# Patient Record
Sex: FEMALE | Race: OTHER | HISPANIC OR LATINO | URBAN - METROPOLITAN AREA
[De-identification: names, ages, dates, MRNs, and addresses within clinical notes are randomized per-mention and may not be internally consistent; named-entity substitution may affect disease eponyms.]

---

## 2018-04-12 ENCOUNTER — EMERGENCY (EMERGENCY)
Facility: HOSPITAL | Age: 28
LOS: 1 days | Discharge: HOME | End: 2018-04-12
Attending: EMERGENCY MEDICINE
Payer: SELF-PAY

## 2018-04-12 VITALS
DIASTOLIC BLOOD PRESSURE: 87 MMHG | SYSTOLIC BLOOD PRESSURE: 128 MMHG | HEART RATE: 90 BPM | TEMPERATURE: 99 F | OXYGEN SATURATION: 98 % | RESPIRATION RATE: 20 BRPM

## 2018-04-12 VITALS — HEIGHT: 55 IN | WEIGHT: 192.9 LBS

## 2018-04-12 LAB
ALBUMIN SERPL ELPH-MCNC: 4.1 G/DL — SIGNIFICANT CHANGE UP (ref 3.3–5.2)
ALP SERPL-CCNC: 60 U/L — SIGNIFICANT CHANGE UP (ref 40–120)
ALT FLD-CCNC: 25 U/L — SIGNIFICANT CHANGE UP
ANION GAP SERPL CALC-SCNC: 11 MMOL/L — SIGNIFICANT CHANGE UP (ref 5–17)
AST SERPL-CCNC: 19 U/L — SIGNIFICANT CHANGE UP
BILIRUB SERPL-MCNC: <0.2 MG/DL — LOW (ref 0.4–2)
BUN SERPL-MCNC: 9 MG/DL — SIGNIFICANT CHANGE UP (ref 8–20)
CALCIUM SERPL-MCNC: 9.1 MG/DL — SIGNIFICANT CHANGE UP (ref 8.6–10.2)
CHLORIDE SERPL-SCNC: 104 MMOL/L — SIGNIFICANT CHANGE UP (ref 98–107)
CO2 SERPL-SCNC: 26 MMOL/L — SIGNIFICANT CHANGE UP (ref 22–29)
CREAT SERPL-MCNC: 0.68 MG/DL — SIGNIFICANT CHANGE UP (ref 0.5–1.3)
D DIMER BLD IA.RAPID-MCNC: <150 NG/ML DDU — SIGNIFICANT CHANGE UP
GLUCOSE SERPL-MCNC: 96 MG/DL — SIGNIFICANT CHANGE UP (ref 70–115)
HCG SERPL-ACNC: <5 MIU/ML — SIGNIFICANT CHANGE UP
HCT VFR BLD CALC: 40.7 % — SIGNIFICANT CHANGE UP (ref 37–47)
HGB BLD-MCNC: 13.8 G/DL — SIGNIFICANT CHANGE UP (ref 12–16)
MCHC RBC-ENTMCNC: 29.4 PG — SIGNIFICANT CHANGE UP (ref 27–31)
MCHC RBC-ENTMCNC: 33.9 G/DL — SIGNIFICANT CHANGE UP (ref 32–36)
MCV RBC AUTO: 86.8 FL — SIGNIFICANT CHANGE UP (ref 81–99)
PLATELET # BLD AUTO: 346 K/UL — SIGNIFICANT CHANGE UP (ref 150–400)
POTASSIUM SERPL-MCNC: 4.1 MMOL/L — SIGNIFICANT CHANGE UP (ref 3.5–5.3)
POTASSIUM SERPL-SCNC: 4.1 MMOL/L — SIGNIFICANT CHANGE UP (ref 3.5–5.3)
PROT SERPL-MCNC: 7 G/DL — SIGNIFICANT CHANGE UP (ref 6.6–8.7)
RBC # BLD: 4.69 M/UL — SIGNIFICANT CHANGE UP (ref 4.4–5.2)
RBC # FLD: 12.8 % — SIGNIFICANT CHANGE UP (ref 11–15.6)
SODIUM SERPL-SCNC: 141 MMOL/L — SIGNIFICANT CHANGE UP (ref 135–145)
TROPONIN T SERPL-MCNC: <0.01 NG/ML — SIGNIFICANT CHANGE UP (ref 0–0.06)
WBC # BLD: 9 K/UL — SIGNIFICANT CHANGE UP (ref 4.8–10.8)
WBC # FLD AUTO: 9 K/UL — SIGNIFICANT CHANGE UP (ref 4.8–10.8)

## 2018-04-12 PROCEDURE — 84484 ASSAY OF TROPONIN QUANT: CPT

## 2018-04-12 PROCEDURE — T1013: CPT

## 2018-04-12 PROCEDURE — 80053 COMPREHEN METABOLIC PANEL: CPT

## 2018-04-12 PROCEDURE — 71045 X-RAY EXAM CHEST 1 VIEW: CPT

## 2018-04-12 PROCEDURE — 85027 COMPLETE CBC AUTOMATED: CPT

## 2018-04-12 PROCEDURE — 36415 COLL VENOUS BLD VENIPUNCTURE: CPT

## 2018-04-12 PROCEDURE — 99285 EMERGENCY DEPT VISIT HI MDM: CPT

## 2018-04-12 PROCEDURE — 99283 EMERGENCY DEPT VISIT LOW MDM: CPT

## 2018-04-12 PROCEDURE — 84702 CHORIONIC GONADOTROPIN TEST: CPT

## 2018-04-12 PROCEDURE — 93005 ELECTROCARDIOGRAM TRACING: CPT

## 2018-04-12 PROCEDURE — 85379 FIBRIN DEGRADATION QUANT: CPT

## 2018-04-12 PROCEDURE — 71045 X-RAY EXAM CHEST 1 VIEW: CPT | Mod: 26

## 2018-04-12 PROCEDURE — 93010 ELECTROCARDIOGRAM REPORT: CPT

## 2018-04-12 RX ORDER — METHOCARBAMOL 500 MG/1
1 TABLET, FILM COATED ORAL
Qty: 15 | Refills: 0
Start: 2018-04-12 | End: 2018-04-16

## 2018-04-12 RX ORDER — IBUPROFEN 200 MG
1 TABLET ORAL
Qty: 28 | Refills: 0
Start: 2018-04-12 | End: 2018-04-18

## 2018-04-12 RX ORDER — METHOCARBAMOL 500 MG/1
1500 TABLET, FILM COATED ORAL ONCE
Qty: 0 | Refills: 0 | Status: COMPLETED | OUTPATIENT
Start: 2018-04-12 | End: 2018-04-12

## 2018-04-12 RX ADMIN — METHOCARBAMOL 1500 MILLIGRAM(S): 500 TABLET, FILM COATED ORAL at 19:48

## 2018-04-12 NOTE — ED PROVIDER NOTE - OBJECTIVE STATEMENT
26 y/o female with PMHx asthma presents to the ED with c/o left sided chest pain radiating towards left shoulder, onset this morning. Pt states pain is not similar to symptoms caused by asthma. Pt attempted to alleviate with Aspirin x2, last taken this morning. Pt states her mother  secondary to heart problems in her 40's. Pt denies SOB, diaphoresis, n/v, and any other acute symptoms and complaints at this time. Pt denies birth control use, denies chance of pregnancy.

## 2018-04-12 NOTE — ED PROVIDER NOTE - NS_ ATTENDINGSCRIBEDETAILS _ED_A_ED_FT
I, Reece Acevedo, performed the initial face to face bedside interview with this patient regarding history of present illness, review of symptoms and relevant past medical, social and family history.  I completed an independent physical examination.    The history, relevant review of systems, past medical and surgical history, medical decision making, and physical examination was documented by the scribe in my presence and I attest to the accuracy of the documentation.

## 2018-04-12 NOTE — ED ADULT NURSE REASSESSMENT NOTE - NS ED NURSE REASSESS COMMENT FT1
Pt axox3 denies any complaint at this time. Pt states pain in chest has resolved and is comfortable with DC home.

## 2018-04-12 NOTE — ED ADULT NURSE NOTE - OBJECTIVE STATEMENT
Pt axox3 c/o left sided chest pain with radiation to left arm. PT denies any recent heavy lifting, injury and states she was recent " kept in the hospital for her chest a couple of months ago". PT sinus on tele monitor, denies sob or recent travel/ stationary periods.

## 2018-04-12 NOTE — ED PROVIDER NOTE - MUSCULOSKELETAL, MLM
Spine midline, nontender. Tenderness to anterior chest and left shoulder with painful ROM. Full ROM in all other extremities.

## 2019-03-26 NOTE — ED PROVIDER NOTE - EYES, MLM
EOMI. W Plasty Text: The lesion was extirpated to the level of the fat with a #15 scalpel blade.  Given the location of the defect, shape of the defect and the proximity to free margins a W-plasty was deemed most appropriate for repair.  Using a sterile surgical marker, the appropriate transposition arms of the W-plasty were drawn incorporating the defect and placing the expected incisions within the relaxed skin tension lines where possible.    The area thus outlined was incised deep to adipose tissue with a #15 scalpel blade.  The skin margins were undermined to an appropriate distance in all directions utilizing iris scissors.  The opposing transposition arms were then transposed into place in opposite direction and anchored with interrupted buried subcutaneous sutures.

## 2021-01-11 ENCOUNTER — EMERGENCY (EMERGENCY)
Facility: HOSPITAL | Age: 31
LOS: 1 days | Discharge: HOME | End: 2021-01-11
Attending: EMERGENCY MEDICINE
Payer: MEDICAID

## 2021-01-11 VITALS
HEART RATE: 150 BPM | RESPIRATION RATE: 32 BRPM | SYSTOLIC BLOOD PRESSURE: 118 MMHG | OXYGEN SATURATION: 97 % | TEMPERATURE: 98 F | WEIGHT: 197.98 LBS | HEIGHT: 55 IN | DIASTOLIC BLOOD PRESSURE: 84 MMHG

## 2021-01-11 VITALS
SYSTOLIC BLOOD PRESSURE: 99 MMHG | TEMPERATURE: 99 F | OXYGEN SATURATION: 99 % | DIASTOLIC BLOOD PRESSURE: 64 MMHG | HEART RATE: 95 BPM | RESPIRATION RATE: 19 BRPM

## 2021-01-11 PROBLEM — J45.909 UNSPECIFIED ASTHMA, UNCOMPLICATED: Chronic | Status: ACTIVE | Noted: 2018-04-16

## 2021-01-11 PROBLEM — R07.9 CHEST PAIN, UNSPECIFIED: Chronic | Status: ACTIVE | Noted: 2018-04-12

## 2021-01-11 LAB
ALBUMIN SERPL ELPH-MCNC: 4 G/DL — SIGNIFICANT CHANGE UP (ref 3.3–5.2)
ALP SERPL-CCNC: 47 U/L — SIGNIFICANT CHANGE UP (ref 40–120)
ALT FLD-CCNC: 38 U/L — HIGH
ANION GAP SERPL CALC-SCNC: 12 MMOL/L — SIGNIFICANT CHANGE UP (ref 5–17)
APTT BLD: 36.7 SEC — HIGH (ref 27.5–35.5)
AST SERPL-CCNC: 38 U/L — HIGH
B PERT DNA SPEC QL NAA+PROBE: SIGNIFICANT CHANGE UP
BASE EXCESS BLDV CALC-SCNC: 0.5 MMOL/L — SIGNIFICANT CHANGE UP (ref -2–2)
BASOPHILS # BLD AUTO: 0.02 K/UL — SIGNIFICANT CHANGE UP (ref 0–0.2)
BASOPHILS NFR BLD AUTO: 0.3 % — SIGNIFICANT CHANGE UP (ref 0–2)
BILIRUB SERPL-MCNC: 0.2 MG/DL — LOW (ref 0.4–2)
BUN SERPL-MCNC: 5 MG/DL — LOW (ref 8–20)
C PNEUM DNA SPEC QL NAA+PROBE: SIGNIFICANT CHANGE UP
CA-I SERPL-SCNC: 1.04 MMOL/L — LOW (ref 1.15–1.33)
CALCIUM SERPL-MCNC: 8.6 MG/DL — SIGNIFICANT CHANGE UP (ref 8.6–10.2)
CHLORIDE BLDV-SCNC: 106 MMOL/L — SIGNIFICANT CHANGE UP (ref 98–107)
CHLORIDE SERPL-SCNC: 102 MMOL/L — SIGNIFICANT CHANGE UP (ref 98–107)
CK SERPL-CCNC: 140 U/L — SIGNIFICANT CHANGE UP (ref 25–170)
CO2 SERPL-SCNC: 21 MMOL/L — LOW (ref 22–29)
CREAT SERPL-MCNC: 0.76 MG/DL — SIGNIFICANT CHANGE UP (ref 0.5–1.3)
CRP SERPL-MCNC: 7.48 MG/DL — HIGH (ref 0–0.4)
D DIMER BLD IA.RAPID-MCNC: <150 NG/ML DDU — SIGNIFICANT CHANGE UP
EOSINOPHIL # BLD AUTO: 0.18 K/UL — SIGNIFICANT CHANGE UP (ref 0–0.5)
EOSINOPHIL NFR BLD AUTO: 2.8 % — SIGNIFICANT CHANGE UP (ref 0–6)
FERRITIN SERPL-MCNC: 207 NG/ML — HIGH (ref 15–150)
FLUAV H1 2009 PAND RNA SPEC QL NAA+PROBE: SIGNIFICANT CHANGE UP
FLUAV H1 RNA SPEC QL NAA+PROBE: SIGNIFICANT CHANGE UP
FLUAV H3 RNA SPEC QL NAA+PROBE: SIGNIFICANT CHANGE UP
FLUAV SUBTYP SPEC NAA+PROBE: SIGNIFICANT CHANGE UP
FLUBV RNA SPEC QL NAA+PROBE: SIGNIFICANT CHANGE UP
GAS PNL BLDV: 141 MMOL/L — SIGNIFICANT CHANGE UP (ref 135–145)
GAS PNL BLDV: SIGNIFICANT CHANGE UP
GAS PNL BLDV: SIGNIFICANT CHANGE UP
GLUCOSE BLDV-MCNC: 85 MG/DL — SIGNIFICANT CHANGE UP (ref 70–99)
GLUCOSE SERPL-MCNC: 92 MG/DL — SIGNIFICANT CHANGE UP (ref 70–99)
HADV DNA SPEC QL NAA+PROBE: SIGNIFICANT CHANGE UP
HCG SERPL-ACNC: <4 MIU/ML — SIGNIFICANT CHANGE UP
HCO3 BLDV-SCNC: 25 MMOL/L — SIGNIFICANT CHANGE UP (ref 21–29)
HCOV PNL SPEC NAA+PROBE: SIGNIFICANT CHANGE UP
HCT VFR BLD CALC: 41.7 % — SIGNIFICANT CHANGE UP (ref 34.5–45)
HCT VFR BLDA CALC: 45 — SIGNIFICANT CHANGE UP (ref 39–50)
HGB BLD CALC-MCNC: 14.8 G/DL — SIGNIFICANT CHANGE UP (ref 11.5–15.5)
HGB BLD-MCNC: 14.4 G/DL — SIGNIFICANT CHANGE UP (ref 11.5–15.5)
HMPV RNA SPEC QL NAA+PROBE: SIGNIFICANT CHANGE UP
HPIV1 RNA SPEC QL NAA+PROBE: SIGNIFICANT CHANGE UP
HPIV2 RNA SPEC QL NAA+PROBE: SIGNIFICANT CHANGE UP
HPIV3 RNA SPEC QL NAA+PROBE: SIGNIFICANT CHANGE UP
HPIV4 RNA SPEC QL NAA+PROBE: SIGNIFICANT CHANGE UP
IMM GRANULOCYTES NFR BLD AUTO: 0.3 % — SIGNIFICANT CHANGE UP (ref 0–1.5)
INR BLD: 1.09 RATIO — SIGNIFICANT CHANGE UP (ref 0.88–1.16)
LACTATE BLDV-MCNC: 1.2 MMOL/L — SIGNIFICANT CHANGE UP (ref 0.5–2)
LYMPHOCYTES # BLD AUTO: 1.7 K/UL — SIGNIFICANT CHANGE UP (ref 1–3.3)
LYMPHOCYTES # BLD AUTO: 26.5 % — SIGNIFICANT CHANGE UP (ref 13–44)
MCHC RBC-ENTMCNC: 29.6 PG — SIGNIFICANT CHANGE UP (ref 27–34)
MCHC RBC-ENTMCNC: 34.5 GM/DL — SIGNIFICANT CHANGE UP (ref 32–36)
MCV RBC AUTO: 85.6 FL — SIGNIFICANT CHANGE UP (ref 80–100)
MONOCYTES # BLD AUTO: 0.42 K/UL — SIGNIFICANT CHANGE UP (ref 0–0.9)
MONOCYTES NFR BLD AUTO: 6.5 % — SIGNIFICANT CHANGE UP (ref 2–14)
NEUTROPHILS # BLD AUTO: 4.08 K/UL — SIGNIFICANT CHANGE UP (ref 1.8–7.4)
NEUTROPHILS NFR BLD AUTO: 63.6 % — SIGNIFICANT CHANGE UP (ref 43–77)
OTHER CELLS CSF MANUAL: 18 ML/DL — SIGNIFICANT CHANGE UP (ref 18–22)
PCO2 BLDV: 36 MMHG — SIGNIFICANT CHANGE UP (ref 35–50)
PH BLDV: 7.44 — HIGH (ref 7.32–7.43)
PLATELET # BLD AUTO: 236 K/UL — SIGNIFICANT CHANGE UP (ref 150–400)
PO2 BLDV: 52 MMHG — HIGH (ref 25–45)
POTASSIUM BLDV-SCNC: 3.7 MMOL/L — SIGNIFICANT CHANGE UP (ref 3.4–4.5)
POTASSIUM SERPL-MCNC: 3.7 MMOL/L — SIGNIFICANT CHANGE UP (ref 3.5–5.3)
POTASSIUM SERPL-SCNC: 3.7 MMOL/L — SIGNIFICANT CHANGE UP (ref 3.5–5.3)
PROCALCITONIN SERPL-MCNC: 0.07 NG/ML — SIGNIFICANT CHANGE UP (ref 0.02–0.1)
PROT SERPL-MCNC: 7.2 G/DL — SIGNIFICANT CHANGE UP (ref 6.6–8.7)
PROTHROM AB SERPL-ACNC: 12.6 SEC — SIGNIFICANT CHANGE UP (ref 10.6–13.6)
RAPID RVP RESULT: DETECTED
RBC # BLD: 4.87 M/UL — SIGNIFICANT CHANGE UP (ref 3.8–5.2)
RBC # FLD: 12.2 % — SIGNIFICANT CHANGE UP (ref 10.3–14.5)
RV+EV RNA SPEC QL NAA+PROBE: SIGNIFICANT CHANGE UP
SAO2 % BLDV: 90 % — SIGNIFICANT CHANGE UP
SARS-COV-2 RNA SPEC QL NAA+PROBE: DETECTED
SODIUM SERPL-SCNC: 135 MMOL/L — SIGNIFICANT CHANGE UP (ref 135–145)
TROPONIN T SERPL-MCNC: <0.01 NG/ML — SIGNIFICANT CHANGE UP (ref 0–0.06)
WBC # BLD: 6.42 K/UL — SIGNIFICANT CHANGE UP (ref 3.8–10.5)
WBC # FLD AUTO: 6.42 K/UL — SIGNIFICANT CHANGE UP (ref 3.8–10.5)

## 2021-01-11 PROCEDURE — 82550 ASSAY OF CK (CPK): CPT

## 2021-01-11 PROCEDURE — 85610 PROTHROMBIN TIME: CPT

## 2021-01-11 PROCEDURE — 85025 COMPLETE CBC W/AUTO DIFF WBC: CPT

## 2021-01-11 PROCEDURE — 96375 TX/PRO/DX INJ NEW DRUG ADDON: CPT

## 2021-01-11 PROCEDURE — 83605 ASSAY OF LACTIC ACID: CPT

## 2021-01-11 PROCEDURE — 85018 HEMOGLOBIN: CPT

## 2021-01-11 PROCEDURE — 99284 EMERGENCY DEPT VISIT MOD MDM: CPT | Mod: 25

## 2021-01-11 PROCEDURE — 93010 ELECTROCARDIOGRAM REPORT: CPT

## 2021-01-11 PROCEDURE — 85730 THROMBOPLASTIN TIME PARTIAL: CPT

## 2021-01-11 PROCEDURE — 82330 ASSAY OF CALCIUM: CPT

## 2021-01-11 PROCEDURE — 96374 THER/PROPH/DIAG INJ IV PUSH: CPT

## 2021-01-11 PROCEDURE — 71045 X-RAY EXAM CHEST 1 VIEW: CPT | Mod: 26

## 2021-01-11 PROCEDURE — 80053 COMPREHEN METABOLIC PANEL: CPT

## 2021-01-11 PROCEDURE — 85379 FIBRIN DEGRADATION QUANT: CPT

## 2021-01-11 PROCEDURE — 84295 ASSAY OF SERUM SODIUM: CPT

## 2021-01-11 PROCEDURE — 82728 ASSAY OF FERRITIN: CPT

## 2021-01-11 PROCEDURE — 36415 COLL VENOUS BLD VENIPUNCTURE: CPT

## 2021-01-11 PROCEDURE — 84484 ASSAY OF TROPONIN QUANT: CPT

## 2021-01-11 PROCEDURE — 82803 BLOOD GASES ANY COMBINATION: CPT

## 2021-01-11 PROCEDURE — 82435 ASSAY OF BLOOD CHLORIDE: CPT

## 2021-01-11 PROCEDURE — 86140 C-REACTIVE PROTEIN: CPT

## 2021-01-11 PROCEDURE — 84132 ASSAY OF SERUM POTASSIUM: CPT

## 2021-01-11 PROCEDURE — 84702 CHORIONIC GONADOTROPIN TEST: CPT

## 2021-01-11 PROCEDURE — 85014 HEMATOCRIT: CPT

## 2021-01-11 PROCEDURE — 84145 PROCALCITONIN (PCT): CPT

## 2021-01-11 PROCEDURE — 82947 ASSAY GLUCOSE BLOOD QUANT: CPT

## 2021-01-11 PROCEDURE — 99285 EMERGENCY DEPT VISIT HI MDM: CPT

## 2021-01-11 PROCEDURE — 0225U NFCT DS DNA&RNA 21 SARSCOV2: CPT

## 2021-01-11 PROCEDURE — 71045 X-RAY EXAM CHEST 1 VIEW: CPT

## 2021-01-11 PROCEDURE — 93005 ELECTROCARDIOGRAM TRACING: CPT

## 2021-01-11 RX ORDER — KETOROLAC TROMETHAMINE 30 MG/ML
30 SYRINGE (ML) INJECTION ONCE
Refills: 0 | Status: DISCONTINUED | OUTPATIENT
Start: 2021-01-11 | End: 2021-01-11

## 2021-01-11 RX ORDER — SODIUM CHLORIDE 9 MG/ML
1000 INJECTION INTRAMUSCULAR; INTRAVENOUS; SUBCUTANEOUS ONCE
Refills: 0 | Status: COMPLETED | OUTPATIENT
Start: 2021-01-11 | End: 2021-01-11

## 2021-01-11 RX ORDER — ONDANSETRON 8 MG/1
1 TABLET, FILM COATED ORAL
Qty: 15 | Refills: 0
Start: 2021-01-11 | End: 2021-01-15

## 2021-01-11 RX ORDER — ONDANSETRON 8 MG/1
4 TABLET, FILM COATED ORAL ONCE
Refills: 0 | Status: COMPLETED | OUTPATIENT
Start: 2021-01-11 | End: 2021-01-11

## 2021-01-11 RX ADMIN — Medication 30 MILLIGRAM(S): at 13:37

## 2021-01-11 RX ADMIN — SODIUM CHLORIDE 1000 MILLILITER(S): 9 INJECTION INTRAMUSCULAR; INTRAVENOUS; SUBCUTANEOUS at 13:13

## 2021-01-11 RX ADMIN — SODIUM CHLORIDE 1000 MILLILITER(S): 9 INJECTION INTRAMUSCULAR; INTRAVENOUS; SUBCUTANEOUS at 13:38

## 2021-01-11 RX ADMIN — ONDANSETRON 4 MILLIGRAM(S): 8 TABLET, FILM COATED ORAL at 13:13

## 2021-01-11 NOTE — ED PROVIDER NOTE - CLINICAL SUMMARY MEDICAL DECISION MAKING FREE TEXT BOX
29 y/o F presents with covid-like symptoms, tachycardic, lungs clear, satting well on room air, afebrile. Will check covid-labs, CXR, IV hydration, toradol for body aches. Supportive care. Discussed indications to return to the ED including hypoxia, intractable vomiting, high fevers not improving with antipyretics.

## 2021-01-11 NOTE — ED ADULT NURSE NOTE - OBJECTIVE STATEMENT
30y female AOx4 c/o flu like symptoms including body aches, cough, SOB, loss of sense of smell/taste. known hx of asthma. O2 sat 97% on room air. respirations elevated, frequent dry nonproductive cough noted. pt sinus tach on cardiac monitor ~ 113 bpm. abd soft and nondistended. skiN WNL for race. pt denies recent sick contact or recent travel.

## 2021-01-11 NOTE — ED PROVIDER NOTE - PATIENT PORTAL LINK FT
You can access the FollowMyHealth Patient Portal offered by [facility] by registering at the following website: [email or web address]. By joining [facility]’s FollowMyHealth portal, you will also be able to view your health information using other applications (apps) compatible with our system.

## 2021-01-11 NOTE — ED PROVIDER NOTE - PHYSICAL EXAMINATION
Const: Awake, alert and oriented. Appears uncomfortable, non-toxic appearing, coughing dry cough.  HEENT: NC/AT. Moist mucous membranes.  Eyes: No scleral icterus. EOMI.  Neck:. Soft and supple. Full ROM without pain.  Cardiac: Tachycardic rate and regular rhythm. +S1/S2. No murmurs. Peripheral pulses 2+ and symmetric. No LE edema.  Resp: Speaking in full sentences. Tachypneic, but not hypoxic on room air. No wheezes, rales or rhonchi.  Abd: Soft, mildly diffusely tender, non-distended. Normal bowel sounds in all 4 quadrants. No guarding or rebound.  Back: Spine midline and non-tender. No CVAT.  Skin: No rashes, abrasions or lacerations.  Neuro: Awake, alert & oriented x 3. Moves all extremities symmetrically.

## 2021-01-11 NOTE — ED PROVIDER NOTE - PROGRESS NOTE DETAILS
Glenn: Patient reassessed with  Lyn. She feels better. Discussed indications to return, the importance of follow up and supportive care.

## 2021-01-11 NOTE — ED PROVIDER NOTE - NSFOLLOWUPINSTRUCTIONS_ED_ALL_ED_FT
COVID-19 (Enfermedad por coronavirus 2019)    LO QUE NECESITA SABER:    La COVID-19 es la enfermedad causada por el nuevo coronavirus descubierto por primera vez en diciembre de 2019. Los coronavirus generalmente causan infecciones de las vías respiratorias superiores (nariz, garganta y pulmones), nell un resfriado. El nuevo virus también puede causar afecciones respiratorias inferiores graves, nell la neumonía o el síndrome de dificultad respiratoria aguda (SDRA). El nuevo virus también puede afectar muchos otros órganos, incluyendo el cerebro y el corazón. Los vasos sanguíneos también pueden verse afectados, lo que provoca la formación de coágulos de diana. Cualquier persona puede desarrollar problemas graves a causa del nuevo virus, charles el riesgo es mayor si tiene 65 años o más. Un sistema inmunitario débil, la diabetes o kristin enfermedad cardíaca o pulmonar también pueden aumentar el riesgo. El riesgo también es mayor si usted es o ha sido fumador de cigarrillos.    INSTRUCCIONES SOBRE EL [facility] HOSPITALARIA:    Si jose alejandro que usted o alguien que conoce puede estar infectado:Mandeep lo siguiente para proteger a otras personas:   •Si se requiere atención de emergencia,avise al operador de la posible infección, o llame antes y avise al servicio de urgencias.      •Llame a un médicopara recibir instrucciones si los síntomas son leves. Cualquier persona que pueda estar infectada no debe llegar sin llamar jenny. El médico deberá proteger a los miembros del personal y a otros pacientes.      •La persona que puede estar infectada debe usar un tapabocasmientras reciben atención médica. [place] ayudará a reducir el riesgo de infectar a otras personas. Nadie que sea sage de 2 años, que tenga problemas respiratorios o que no pueda quitárselo debe usar un tapabocas. El médico puede darle instrucciones para cualquier persona que no pueda usar un tapabocas.      Llame al número local de emergencias (911 en los [place]) o al departamento de emergencias si:  •Usted tiene dificultad para respirar o falta de aliento mientras descansa.      •Usted siente presión o dolor en el pecho que dura más de 5 minutos.      •Usted tiene confusión o es difícil despertarlo.      •Alexa labios o sabi están azules.      •Usted tiene fiebre de 104 ºF (40 °C) o más.      Llame a kessler médico si:  •No tiene síntomas de COVID-19 charles tuvo contacto físico cercano dentro de los 14 días con alguien que allegra positivo.      •Usted tiene preguntas o inquietudes acerca de kessler condición o cuidado.      Medicamentos:Es posible que necesite alguno de los siguientes si los síntomas son leves:   •Los descongestivosayudan a reducir la congestión nasal y ayudan a respirar más fácilmente. Si leandro pastillas descongestivas, pueden causarle agitación o problemas para dormir. No use aerosol descongestionante por más de unos cuantos días.      •Los jarabes para la tosayudan a reducir la tos. Pregúntele a kessler médico cuál tipo de medicamento para la tos es mejor para usted.      •Acetaminofénalivia el dolor y baja la fiebre. Está disponible sin receta médica. Pregunte la cantidad y la frecuencia con que debe tomarlos. Siga las indicaciones. Otilio las etiquetas de todos los demás medicamentos que esté usando para saber si también contienen acetaminofén, o pregunte a kessler médico o farmacéutico. El acetaminofén puede causar daño en el hígado cuando no se leandro de forma correcta. No use más de 4 gramos (4000 miligramos) en total de acetaminofeno en un día.      •Los GERMAINE,nell el ibuprofeno, ayudan a disminuir la inflamación, el dolor y la fiebre. Los GERMAINE pueden causar sangrado estomacal o problemas renales en ciertas personas. Si usted leandro un medicamento anticoagulante, siempre pregúntele a kessler médico si los GERMAINE son seguros para usted. Siempre otilio la etiqueta de barbara medicamento y siga las instrucciones.      •[place] alexa medicamentos nell se le haya indicado.Consulte con kessler médico si usted jose alejandro que kessler medicamento no le está ayudando o si presenta efectos secundarios. Infórmele si es alérgico a cualquier medicamento. Mantenga kristin lista actualizada de los medicamentos, las vitaminas y los productos herbales que leandro. Incluya los siguientes datos de los medicamentos: cantidad, frecuencia y motivo de administración. Traiga con usted la lista o los envases de las píldoras a alexa citas de seguimiento. Lleve la lista de los medicamentos con usted en darlene de kristin emergencia.      Cómo se propaga el coronavirus 2019:El virus se propaga rápida y fácilmente. Puede infectarse si está en contacto con kristin gran cantidad del virus, incluso alejandra poco tiempo. También puede infectarse por estar cerca de kristin pequeña cantidad del virus alejandra mucho tiempo. A continuación se indican las formas en que se jose alejandro que se propaga el virus, charles es posible que surja más información:   •Las gotitas son la forma más común de propagación de todos los coronavirus.El virus puede viajar en gotitas que se mark cuando kristin persona habla, tose o estornuda. Cualquiera que respire las gotitas o que las gotitas se le metan en los ojos puede infectarse con el virus. Las gotitas pueden permanecer en el aire alejandra unas horas y viajar a más de 6 pies (2 metros). Kristin persona puede tener contacto con las gotitas, incluso después de que la persona infectada salga de la habitación. [place] se llama transmisión aérea, kristin forma menos común en la que el virus puede propagarse. Se glez observado principalmente en habitaciones cerradas que no tienen flujo de aire.      •El contacto personal cercano con kristin persona infectada aumenta el riesgo de infección.El contacto personal cercano significa estar a menos de 6 pies (2 metros) de otra persona. El virus puede transmitirse a partir de 2 días antes de que comiencen los síntomas. El virus puede transmitirse incluso si la persona nunca desarrolla síntomas, comenzando 2 días antes de kristin prueba positiva. La infección puede ocurrir por un contacto cercano alejandra un total de 15 minutos o más en 24 horas. Un ejemplo es el contacto cercano 3 veces alejandra 5 minutos cada kristin en 24 horas. Algunos factores hacen más probable kristin infección. [place] incluye la cercanía y la duración del contacto. El riesgo es mayor si está en el interior y no circula el aire. El riesgo es aún mayor si ambas personas no están usando tapabocas.      •El contacto de persona a persona puede propagar el virus.Por ejemplo, kristin persona con el virus en alexa luis puede propagarlo al darle la mano a alguien. Algunos recién nacidos medina dado positivo para el virus. No se sabe con certeza si los recién nacidos se infectaron alejandra el embarazo o después de nacer. El mayor riesgo es que un recién nacido esté en contacto cercano con kristin persona infectada. El virus tampoco parece propagarse por la leche materna. Si está embarazada o amamantando, hable con kessler médico u obstetra sobre cualquier preocupación que tenga.      •El virus puede permanecer en objetos y superficies.Kristin persona puede contraer el virus en alexa luis al tocar el objeto o la superficie. La infección se produce si la persona se toca los ojos o la boca sin antes lavarse las luis. Aún no se sabe cuánto tiempo puede permanecer el virus en un objeto o superficie. Por eso es importante limpiar todas las superficies que se usan regularmente.      •Un animal infectado puede ser capaz de infectar a kristin persona que lo toque.[place] puede ocurrir en mercados vivos o en kristin filipe.      Cómo todo el shamika puede reducir el riesgo de COVID-19:La mejor manera de prevenir la infección es evitar a cualquiera que esté infectado, charles esto puede ser difícil de lograr. Kristin persona infectada puede propagar el virus antes de que aparezcan los signos o síntomas, o incluso si los signos o síntomas nunca se desarrollan. Lo siguiente puede ayudar a reducir el riesgo de infección:     Limite la propagación de las enfermedades infecciosas     •Lávese las luis con frecuencia alejandra el día.Utilice agua y jabón. Frótese las luis enjabonadas, entrelazando los dedos. Lávese el frente y el dorso de cada mano, y entre los dedos. Use los dedos de kristin mano para restregar debajo de las uñas de la otra mano. Lávese alejandra al menos 20 segundos. Enjuague con agua corriente caliente alejandra varios segundos. Luego séquese las luis con kristin toalla limpia o kristin toalla de papel. Puede usar un desinfectante para luis que contenga alcohol, si no hay agua y jabón disponibles. No se toque los ojos, la nariz o la boca sin antes lavarse las luis. Enseñe a los niños a lavarse las luis y a usar el desinfectante de luis.  Lavado de luis           •Cúbrase al toser o estornudar.[place] chris que las gotitas viajen de usted a los demás. Gire la sabi y cúbrase la boca y la nariz con un pañuelo. Deseche el pañuelo. Use el ángulo del brazo si no tiene un pañuelo disponible. Luego lávese las luis con agua y jabón o use un desinfectante de luis. Gire la chaya y cúbrase si está cerca de alguien que está estornudando o tosiendo. Enséñeles a los niños a cubrirse al toser o estornudar.      •Siga las pautas de distanciamiento social a nivel local, nacional y mundial.El distanciamiento social significa que las personas evitan el contacto físico cercano para que el virus no se propague de kristin persona a otra. Mantenga al menos 6 pies (2 metros) de distancia entre usted y los demás. También mantenga esta distancia de cualquiera que venga a kessler casa, nell alguien que mandeep kristin entrega.      •Acostúmbrese a no tocarse la sabi.No se sabe cuánto tiempo puede permanecer el virus en los objetos y las superficies. Si tiene el virus en las luis, puede transferirlo a los ojos, la nariz o la boca e infectarse. También puede transferirlo a los objetos, las superficies o las personas. Tenga cuidado con lo que toca cuando sale. Por ejemplo, los pasamanos y botones de ascensor. Intente no tocar nada con las luis descubiertas a menos que sea necesario. Lávese las luis antes de salir de kessler casa y cuando regresa.      •Limpie y desinfecte a menudo los objetos y las superficies de alto contacto.Use kristin solución o toallitas desinfectantes. Puede hacer kristin solución diluyendo 4 cucharaditas de lejía en 1 cuarto de galón (4 tazas) de agua. Limpie y desinfecte aunque piense que nadie que viva o haya entrado en kessler casa esté infectado con el virus. Puede limpiar los objetos con un paño desinfectante antes de llevarlos a kessler casa. Lávese las luis después de manipular cualquier cosa que traiga a kessler casa.      •Pregunte sobre las vacunas que pudiera necesitar.No hay ninguna vacuna contra la COVID-19 disponible actualmente. Se está investigando kristin vacuna para kessler uso nell inmunización activa contra la COVID-19 en adultos. [facility] médico puede darle más información sobre cuándo puede estar disponible kristin vacuna. Mientras tanto, otras vacunas pueden ayudar a kessler sistema inmunitario a combatir las infecciones. Debe recibir la vacuna contra la influenza (gripe) tan pronto nell se lo recomienden cada año, generalmente en septiembre u octubre. Vacúnese contra la neumonía si se recomienda. [facility]ler médico puede indicarle si necesita también otras vacunas, y cuándo aplicárselas.      Pautas de distanciamiento social:Las normas de distanciamiento social nacionales y locales varían. Las reglas pueden cambiar con el tiempo a medida que se levantan las restricciones. Las restricciones pueden volver a aplicarse si se produce un brote en el lugar donde usted vive. Es importante conocer y seguir todas las reglas de distanciamiento social actuales en kessler área. Las siguientes son reglas generales al respecto:  •Quédese en casa si está enfermo o jose alejandro que puede tener COVID-19.Es importante que se quede en casa si está esperando kristin franchesca para kristin prueba o los resultados de kristin prueba. Incluso si no tiene síntomas, puede transmitir el virus a otros.      •Limite los viajes fuera de kessler casa.Es posible que se le entreguen alimentos, medicinas y otros suministros. Si es posible, mandeep que dejen los objetos que le entregan en kessler wilfrid o en otra área. Intente que nadie le entregue un objeto en mano. Estará tan cerca de la persona que el virus puede propagarse entre ustedes. Planifique las salidas fuera de kessler casa. Piense en la cantidad de gente con la que puede tener contacto, y por cuánto tiempo. Planifique kessler mini para que mandeep el sage número de paradas posibles para limitar el contacto. Lleve un registro de los lugares a los que va y las personas con las que tenga contacto. [place] ayudará a que los rastreadores de contacto notifiquen a otros si usted se infecta.      •No tenga contacto físico cercano con nadie que no viva en kessler casa.No le dé la mano, abrace o bese a kristin persona nell saludo. Párese o camine lo más lejos posible de los demás. Si tiene que usar el transporte público (nell el autobús o el metro), intente sentarse o pararse lejos de los demás. Puede mantenerse conectado de forma borja con los demás a través de llamadas telefónicas, mensajes de correo electrónico, sitios web de medios sociales y videochats. Verifique cómo están las personas que pueden tener dificultades para distanciarse socialmente, o que viven solas. Pregunte a los administradores de los asilos de ancianos o de las instalaciones de cuidados a vonda plazo cómo puede comunicarse con seguridad con alguien que vive allí.      •Use un tapabocas (mascarilla) cuando esté cerca de alguien que no vive en kessler casa.El tapabocas ayuda a proteger a la persona que lo usa de infectarse o transmitir el virus a otros. No use un protector facial de plástico en lugar de un tapabocas. Puede usar ambos juntos para protección adicional. Use kristin mascarilla no médica desechable, o mandeep un tapabocas de charles con al menos 2 capas. Cúbrase la boca y la nariz. Asegúrelo debajo de la barbilla y a los lados de la sabi. El tapabocas no es un sustituto de otras medidas de seguridad. Continúe con el distanciamiento social y lávese las luis a menudo. No coloque tapabocas a niños menores de 2 años ni a personas que tengan problemas respiratorios o no puedan quitárselo. Hable con kessler médico si usted o alguien a quien usted cuida no puede usar un tapabocas.      •Permita que solo los profesionales médicos u otros asistentes necesarios ingresen a kessler casa.Use el tapabocas y recuérdeles a los demás que usen un tapabocas. Recuérdeles que se laven las luis cuando lleguen y antes de irse. No permita visitas en kessler casa, aunque no estén enfermos. Kristin persona puede contagiar el virus a otros antes de que comiencen los síntomas de COVID-19. Algunas personas ni siquiera desarrollan síntomas. Los niños suelen tener síntomas leves o ningún síntoma. Puede ser difícil decirle a un jarred que no lo abrace ni lo bese. Explíquele que así es nell puede ayudarlo a mantenerse saludable.      •No vaya a la casa de otra persona, a menos que sea necesario.No vaya de visita, aunque la persona esté howard. Vaya solo si necesita ayudarla. Asegúrese de que ambos usen un tapabocas mientras esté allí.      •Evite las grandes reuniones y las multitudes.Las reuniones o multitudes de 10 o más individuos pueden hacer que el virus se propague. Por ejemplo, fiestas, comidas en días festivos, eventos deportivos, servicios religiosos y conferencias. Se pueden formar multitudes en las playas, los parques y las atracciones turísticas. Protéjase manteniéndose alejado de las grandes reuniones y multitudes.      •Pregunte a kessler médico de qué otra forma puede tener las citas.Es posible que pueda tener citas sin tener que ir al consultorio del médico. Algunos médicos ofrecen citas por teléfono, video u otros tipos de citas. También puede obtener recetas de alexa medicamentos para varios meses de kristin vez.      •Manténgase a mich si debe que salir a trabajar.Es posible que tenga un trabajo que solo se puede hacer fuera de kessler casa. Mantenga la distancia física entre usted y los demás trabajadores tanto nell sea posible. Siga las reglas de kessler empleador para que todos estén a [place].      Si tiene COVID-19 y se está recuperando en casa:Los médicos le darán instrucciones específicas que debe seguir. Las siguientes son pautas generales para recordarle cómo mantener a los demás a [place] hasta que usted esté altagracia:   •Lávese las luis frecuentemente.Use agua y jabón tanto nell sea posible. Puede usar un desinfectante para lius que contenga alcohol, si no hay agua y jabón disponibles. No comparta toallas con nadie. Si usa toallas de papel, deséchelas en un cubo de basura recubierto que se guarda en kessler habitación o área. Use un cubo de basura cubierto, si es posible.      •No salga de kessler casa a menos que sea necesario.Es posible que tenga que ir al consultorio de kessler médico para hacerse chequeos o para resurtir kristin receta. No llegue al consultorio del médico sin kristin franchesca. Los médicos tienen que hacer que alexa consultorios david seguros para el personal y otros pacientes. Si es posible, pídale a alguien que no esté infectado que salga a buscar lo que necesita. [place] incluye alimentos, medicamentos y artículos para el hogar.      •Solo tenga un contacto físico cercano con kristin persona que lo cuide directamente, o con un bebé o jarred que deba cuidar.Los miembros de la jacob y los amigos no deben visitarlo. Si es posible, quédese en un área o habitación separada de kessler casa si vive con otras personas. Nadie debe entrar en el área o en la habitación excepto para brindarle cuidados. Puede visitar a los demás por teléfono, videochat, correo electrónico o sistemas similares. Es importante mantenerse conectado con los demás en kessler marli mientras se recupera.      •Use un tapabocas mientras haya otras personas cerca de usted.[place] puede ayudar a evitar que las gotitas propaguen el virus cuando usted habla, estornuda o tose. Póngase el tapabocas antes de que la persona entre en kessler habitación o área. Recuérdele a la persona que se cubra la nariz y la boca antes de entrar a brindarle cuidados.      •No comparta artículos.No comparta platos, toallas ni otros artículos con nadie. Los artículos deben ser lavados después de usarlos.      •Proteja a kessler bebé.Lávese las luis con agua y jabón con frecuencia alejandra todo el día. Use un tapabocas mientras amamanta o mientras se extrae o se saca la leche materna. Si es posible, pídale a alguien que esté altagracia que cuide de kessler bebé. Puede poner la leche materna en biberones para que la persona la use, si es necesario. Hable con kessler médico si tiene preguntas o inquietudes acerca de cómo cuidar o vincularse con kessler bebé. También le dirá cuándo debe traer a kessler bebé para los chequeos y las vacunas. También le dirá qué hacer si jose alejandro que kessler bebé está infectado con el nuevo virus.      •No manipule animales vivos a menos que sea necesario.Hasta que se sepa más, es mejor no tocar, jugar o manipular animales vivos. Algunos animales, incluyendo las mascotas, medina sido infectados con el nuevo coronavirus. No manipule ni cuide animales hasta que esté altagracia. El cuidado incluye alimentar, acariciar y abrazar a kessler mascota. No deje que kessler mascota lo lama o comparta kessler comida. Pídale a alguien que no esté infectado que cuide de kessler mascota, si es posible. Si debe cuidar a kristin mascota, usa un tapabocas. Lávese las luis antes y después de cuidar a kessler mascota. Hable con kessler médico sobre cómo mantener seguro a un animal de servicio, si es necesario.      •Siga las instrucciones de kessler médico para estar cerca de los demás después de recuperarse.No se sabe con certeza si kristin persona recuperada puede transmitir el virus a otros, ni por cuánto tiempo. [facility]ler médico puede darle instrucciones, nell continuar con el distanciamiento social o usar un tapabocas cuando esté cerca de otras personas. Las siguientes son pautas generales para cuando puede estar cerca de otros:?Si no ha desarrollado ningún síntoma,espere por lo menos 10 días después de kristin prueba positiva. [facility] médico puede querer que tenga 2 pruebas negativas consecutivas, con un intervalo de, al menos, 24 horas. [place] depende de la disponibilidad de las pruebas en kessler área.      ?Si tiene síntomas,esperar al menos 10 días después de que los síntomas aparecieran por primera vez. Luego deberá pasar 24 horas sin fiebre sin recibir medicamentos para la fiebre. La mayoría de alexa síntomas también deberán desaparecer. La pérdida del sentido del gusto o el olfato puede continuar alejandra varios meses. Se considera que está altagracia estar cerca de otros si barbara es el único síntoma.      ?Si fue hospitalizado por COVID-19 y necesitó oxígeno,kessler médico le indicará alejandra cuánto esperar. Es posible que deba esperar hasta 20 días después de la aparición de los síntomas. Puede ser menos si tiene 2 pruebas negativas consecutivas, con un intervalo de, al menos, 24 horas. [place] dependerá de la disponibilidad de las pruebas en kessler área.        Cómo cuidar de alguien que tiene COVID-19:Si la persona vive en otro hogar, coordine un tiempo para brindar cuidados. Recuerde llevar algunos pares de guantes desechables y un tapabocas. Las siguientes son las pautas generales para ayudarle a cuidar de forma borja a cualquier persona que tenga COVID-19:  •Lávese las luis frecuentemente.Lávese antes y después de entrar en la casa, área o habitación de la persona. Deseche las toallas de papel en un cubo de basura recubierto que tenga kristin tapa, si es posible.      •Ayude a la persona para que no exponga a los demás al virus.También ayudará a la persona a descansar haciendo las tareas domésticas, cocinando comidas y haciendo recados para christine. Salga a comprar alimentos, medicamentos o artículos para el hogar que la persona necesita. Asegúrese de que bernard líquidos adicionales y que coma alimentos saludables. Esté atento al empeoramiento de los síntomas. Mantenga la información de contacto de kessler médico donde pueda encontrarla fácilmente. Contacte al médico si nota que un síntoma está empeorando. El médico le indicará lo que tiene que hacer.      •No permita que otros se acerquen a la persona.Nadie debe ingresar a la casa de la persona a menos que sea necesario. De ser posible, la persona debe estar en un área o habitación separada si vive con otras personas. Mantenga la wilfrid de la habitación cerrada a menos que necesite entrar o salir. Mandeep que otras personas llamen, charlen por video o envíen un correo electrónico a la persona si se siente lo suficientemente altagracia. La persona puede sentirse howard si se la mantiene separada alejandra un vonda período de tiempo. La comunicación borja puede ayudar a esta persona a mantenerse en contacto con kessler jacob y amigos.      •Asegúrese de que la habitación de la persona tenga un buen flujo de aire.Puede abrir la ventana si el clima lo permite. También se puede encender el aire acondicionado para ayudar a que el aire se mueva.      •Comuníquese con la persona antes de entrar para brindarle cuidados.Asegúrese de que la persona use un tapabocas. Recuérdele que se lave las luis con agua y jabón. Puede usar un desinfectante para luis que contenga alcohol, si no hay agua y jabón disponibles. Colóquese el tapabocas antes de entrar al lugar a brindar cuidados.      •Use guantes mientras rodolfo cuidados y limpia.Limpie los objetos que la persona usa a menudo. Limpie las encimeras, las superficies de cocción y los frentes y el interior del microondas y el refrigerador. Limpie la ducha, el sanitario, el área alrededor del sanitario, el lavabo, el área alrededor del lavabo y los grifos. Junte la ropa sucia o la ropa de cama. Lave y seque los artículos con el agua más caliente que permita la charles. Lave los platos y utensilios usados en [place] y jabonosa o en un lavavajillas.      •Todo lo que deseche debe ir a un cubo de basura recubierto.Cuando necesite vaciar la basura, cierre el extremo abierto de la cubierta y átela. [place] ayuda a evitar que los artículos en los que está el virus se salgan de la basura. Quítese los guantes y deséchelos. Lávese las luis.      •Siga las instrucciones de kessler médico.Tendrá que estar en cuarentena mientras cuida de la persona infectada. También debería hacerse la prueba, aunque no desarrolle síntomas de COVID-19. Estas medidas ayudarán a proteger a otras personas que estén cerca de usted mientras está cuidando a otra persona. [place]ler médico le dará instrucciones sobre la cuarentena y las pruebas.      Acuda a la consulta de control con kessler médico según las indicaciones:Anote alexa preguntas para que se acuerde de hacerlas alejandra alexa visitas.    Para más información:  •Centers for Disease Control and Prevention  [address] [place] [id number]  Phone: [phone]  Web Address: http://www.cdc.gov

## 2021-01-11 NOTE — ED PROVIDER NOTE - NS ED ROS FT
Const: + fever, + chills  HEENT: + sore throat. Denies blurry vision  Neck: Denies neck pain/stiffness  Resp: + coughing, + SOB  Cardiovascular: Denies CP, palpitations, LE edema  GI: + nausea, + vomiting, + abdominal pain, Denies diarrhea, constipation, blood in stool  : Denies urinary frequency/urgency/dysuria, hematuria  MSK: Denies back pain  Neuro: + HA, Denies dizziness, numbness, weakness  Skin: Denies rashes.

## 2022-11-21 ENCOUNTER — APPOINTMENT (OUTPATIENT)
Dept: ANTEPARTUM | Facility: CLINIC | Age: 32
End: 2022-11-21

## 2022-11-21 ENCOUNTER — ASOB RESULT (OUTPATIENT)
Age: 32
End: 2022-11-21

## 2022-11-21 PROCEDURE — 76817 TRANSVAGINAL US OBSTETRIC: CPT

## 2022-11-21 PROCEDURE — 76801 OB US < 14 WKS SINGLE FETUS: CPT

## 2022-11-22 ENCOUNTER — APPOINTMENT (OUTPATIENT)
Dept: ANTEPARTUM | Facility: CLINIC | Age: 32
End: 2022-11-22

## 2022-11-22 ENCOUNTER — APPOINTMENT (OUTPATIENT)
Dept: MATERNAL FETAL MEDICINE | Facility: CLINIC | Age: 32
End: 2022-11-22

## 2022-12-04 ENCOUNTER — EMERGENCY (EMERGENCY)
Facility: HOSPITAL | Age: 32
LOS: 1 days | Discharge: HOME | End: 2022-12-04
Attending: EMERGENCY MEDICINE
Payer: COMMERCIAL

## 2022-12-04 VITALS
SYSTOLIC BLOOD PRESSURE: 117 MMHG | RESPIRATION RATE: 18 BRPM | HEART RATE: 63 BPM | TEMPERATURE: 99 F | OXYGEN SATURATION: 100 % | DIASTOLIC BLOOD PRESSURE: 82 MMHG

## 2022-12-04 VITALS
TEMPERATURE: 98 F | HEART RATE: 106 BPM | RESPIRATION RATE: 18 BRPM | OXYGEN SATURATION: 100 % | WEIGHT: 192.9 LBS | DIASTOLIC BLOOD PRESSURE: 73 MMHG | SYSTOLIC BLOOD PRESSURE: 115 MMHG

## 2022-12-04 LAB
ALBUMIN SERPL ELPH-MCNC: 4.1 G/DL — SIGNIFICANT CHANGE UP (ref 3.3–5.2)
ALP SERPL-CCNC: 52 U/L — SIGNIFICANT CHANGE UP (ref 40–120)
ALT FLD-CCNC: 38 U/L — HIGH
ANION GAP SERPL CALC-SCNC: 10 MMOL/L — SIGNIFICANT CHANGE UP (ref 5–17)
APPEARANCE UR: CLEAR — SIGNIFICANT CHANGE UP
AST SERPL-CCNC: 27 U/L — SIGNIFICANT CHANGE UP
BACTERIA # UR AUTO: ABNORMAL
BASOPHILS # BLD AUTO: 0.04 K/UL — SIGNIFICANT CHANGE UP (ref 0–0.2)
BASOPHILS NFR BLD AUTO: 0.4 % — SIGNIFICANT CHANGE UP (ref 0–2)
BILIRUB SERPL-MCNC: 0.3 MG/DL — LOW (ref 0.4–2)
BILIRUB UR-MCNC: NEGATIVE — SIGNIFICANT CHANGE UP
BLD GP AB SCN SERPL QL: SIGNIFICANT CHANGE UP
BUN SERPL-MCNC: 6.6 MG/DL — LOW (ref 8–20)
CALCIUM SERPL-MCNC: 9 MG/DL — SIGNIFICANT CHANGE UP (ref 8.4–10.5)
CHLORIDE SERPL-SCNC: 106 MMOL/L — SIGNIFICANT CHANGE UP (ref 96–108)
CO2 SERPL-SCNC: 23 MMOL/L — SIGNIFICANT CHANGE UP (ref 22–29)
COLOR SPEC: YELLOW — SIGNIFICANT CHANGE UP
CREAT SERPL-MCNC: 0.71 MG/DL — SIGNIFICANT CHANGE UP (ref 0.5–1.3)
DIFF PNL FLD: NEGATIVE — SIGNIFICANT CHANGE UP
EGFR: 115 ML/MIN/1.73M2 — SIGNIFICANT CHANGE UP
EOSINOPHIL # BLD AUTO: 0.1 K/UL — SIGNIFICANT CHANGE UP (ref 0–0.5)
EOSINOPHIL NFR BLD AUTO: 1.1 % — SIGNIFICANT CHANGE UP (ref 0–6)
EPI CELLS # UR: SIGNIFICANT CHANGE UP
GLUCOSE SERPL-MCNC: 106 MG/DL — HIGH (ref 70–99)
GLUCOSE UR QL: NEGATIVE MG/DL — SIGNIFICANT CHANGE UP
HCG SERPL-ACNC: 7456 MIU/ML — HIGH
HCT VFR BLD CALC: 38.7 % — SIGNIFICANT CHANGE UP (ref 34.5–45)
HGB BLD-MCNC: 13.2 G/DL — SIGNIFICANT CHANGE UP (ref 11.5–15.5)
IMM GRANULOCYTES NFR BLD AUTO: 0.8 % — SIGNIFICANT CHANGE UP (ref 0–0.9)
KETONES UR-MCNC: NEGATIVE — SIGNIFICANT CHANGE UP
LEUKOCYTE ESTERASE UR-ACNC: NEGATIVE — SIGNIFICANT CHANGE UP
LYMPHOCYTES # BLD AUTO: 2.47 K/UL — SIGNIFICANT CHANGE UP (ref 1–3.3)
LYMPHOCYTES # BLD AUTO: 27.5 % — SIGNIFICANT CHANGE UP (ref 13–44)
MCHC RBC-ENTMCNC: 29.6 PG — SIGNIFICANT CHANGE UP (ref 27–34)
MCHC RBC-ENTMCNC: 34.1 GM/DL — SIGNIFICANT CHANGE UP (ref 32–36)
MCV RBC AUTO: 86.8 FL — SIGNIFICANT CHANGE UP (ref 80–100)
MONOCYTES # BLD AUTO: 0.63 K/UL — SIGNIFICANT CHANGE UP (ref 0–0.9)
MONOCYTES NFR BLD AUTO: 7 % — SIGNIFICANT CHANGE UP (ref 2–14)
NEUTROPHILS # BLD AUTO: 5.66 K/UL — SIGNIFICANT CHANGE UP (ref 1.8–7.4)
NEUTROPHILS NFR BLD AUTO: 63.2 % — SIGNIFICANT CHANGE UP (ref 43–77)
NITRITE UR-MCNC: NEGATIVE — SIGNIFICANT CHANGE UP
PH UR: 8 — SIGNIFICANT CHANGE UP (ref 5–8)
PLATELET # BLD AUTO: 325 K/UL — SIGNIFICANT CHANGE UP (ref 150–400)
POTASSIUM SERPL-MCNC: 3.9 MMOL/L — SIGNIFICANT CHANGE UP (ref 3.5–5.3)
POTASSIUM SERPL-SCNC: 3.9 MMOL/L — SIGNIFICANT CHANGE UP (ref 3.5–5.3)
PROT SERPL-MCNC: 6.8 G/DL — SIGNIFICANT CHANGE UP (ref 6.6–8.7)
PROT UR-MCNC: 15
RAPID RVP RESULT: SIGNIFICANT CHANGE UP
RBC # BLD: 4.46 M/UL — SIGNIFICANT CHANGE UP (ref 3.8–5.2)
RBC # FLD: 13.4 % — SIGNIFICANT CHANGE UP (ref 10.3–14.5)
RBC CASTS # UR COMP ASSIST: SIGNIFICANT CHANGE UP /HPF (ref 0–4)
SARS-COV-2 RNA SPEC QL NAA+PROBE: SIGNIFICANT CHANGE UP
SODIUM SERPL-SCNC: 139 MMOL/L — SIGNIFICANT CHANGE UP (ref 135–145)
SP GR SPEC: 1.01 — SIGNIFICANT CHANGE UP (ref 1.01–1.02)
UROBILINOGEN FLD QL: NEGATIVE MG/DL — SIGNIFICANT CHANGE UP
WBC # BLD: 8.97 K/UL — SIGNIFICANT CHANGE UP (ref 3.8–10.5)
WBC # FLD AUTO: 8.97 K/UL — SIGNIFICANT CHANGE UP (ref 3.8–10.5)
WBC UR QL: SIGNIFICANT CHANGE UP /HPF (ref 0–5)

## 2022-12-04 PROCEDURE — 86850 RBC ANTIBODY SCREEN: CPT

## 2022-12-04 PROCEDURE — 76801 OB US < 14 WKS SINGLE FETUS: CPT

## 2022-12-04 PROCEDURE — 80053 COMPREHEN METABOLIC PANEL: CPT

## 2022-12-04 PROCEDURE — 86900 BLOOD TYPING SEROLOGIC ABO: CPT

## 2022-12-04 PROCEDURE — 81001 URINALYSIS AUTO W/SCOPE: CPT

## 2022-12-04 PROCEDURE — 99285 EMERGENCY DEPT VISIT HI MDM: CPT

## 2022-12-04 PROCEDURE — 0225U NFCT DS DNA&RNA 21 SARSCOV2: CPT

## 2022-12-04 PROCEDURE — 87086 URINE CULTURE/COLONY COUNT: CPT

## 2022-12-04 PROCEDURE — 76801 OB US < 14 WKS SINGLE FETUS: CPT | Mod: 26

## 2022-12-04 PROCEDURE — 96360 HYDRATION IV INFUSION INIT: CPT

## 2022-12-04 PROCEDURE — 36415 COLL VENOUS BLD VENIPUNCTURE: CPT

## 2022-12-04 PROCEDURE — 84702 CHORIONIC GONADOTROPIN TEST: CPT

## 2022-12-04 PROCEDURE — 85025 COMPLETE CBC W/AUTO DIFF WBC: CPT

## 2022-12-04 PROCEDURE — 76817 TRANSVAGINAL US OBSTETRIC: CPT

## 2022-12-04 PROCEDURE — 76817 TRANSVAGINAL US OBSTETRIC: CPT | Mod: 26

## 2022-12-04 PROCEDURE — 86901 BLOOD TYPING SEROLOGIC RH(D): CPT

## 2022-12-04 PROCEDURE — 99284 EMERGENCY DEPT VISIT MOD MDM: CPT | Mod: 25

## 2022-12-04 RX ORDER — SODIUM CHLORIDE 9 MG/ML
1000 INJECTION INTRAMUSCULAR; INTRAVENOUS; SUBCUTANEOUS ONCE
Refills: 0 | Status: COMPLETED | OUTPATIENT
Start: 2022-12-04 | End: 2022-12-04

## 2022-12-04 RX ORDER — ACETAMINOPHEN 500 MG
975 TABLET ORAL ONCE
Refills: 0 | Status: COMPLETED | OUTPATIENT
Start: 2022-12-04 | End: 2022-12-04

## 2022-12-04 RX ADMIN — SODIUM CHLORIDE 1000 MILLILITER(S): 9 INJECTION INTRAMUSCULAR; INTRAVENOUS; SUBCUTANEOUS at 13:04

## 2022-12-04 RX ADMIN — Medication 975 MILLIGRAM(S): at 13:36

## 2022-12-04 NOTE — ED STATDOCS - PATIENT PORTAL LINK FT
You can access the FollowMyHealth Patient Portal offered by [facility] by registering at the following website: [email or web address]. By joining [employer]’s FollowMyHealth portal, you will also be able to view your health information using other applications (apps) compatible with our system.

## 2022-12-04 NOTE — ED STATDOCS - NSFOLLOWUPINSTRUCTIONS_ED_ALL_ED_FT
- Acetaminophen 650mg every 4 hours as needed for pain/fever.  - Increase fluids.   - Please call today schedule follow up appointment with OBGYN.   - Please bring all documentation from your ED visit to any related future follow up appointment.  - Please call to schedule follow up appointment with your primary care physician within 24-48 hours.  - Please seek immediate medical attention or return to the ED for any new/worsening, signs/symptoms, or concerns.    Feel better!       - Acetaminofén 650 mg cada 4 horas según sea necesario para el dolor/fiebre.  - Aumentar los líquidos.  - Llame hoy para programar kristin franchesca de seguimiento con OBGYN.  - Traiga toda la documentación de gann visita al ED a cualquier franchesca de seguimiento futura relacionada.  - Llame para programar kristin franchesca de seguimiento con gann médico de atención primaria dentro de las 24 a 48 horas.  - Busque atención médica inmediata o regrese al servicio de urgencias por cualquier signo/síntoma o inquietud nuevos/empeorados.    ¡Sentirse mejor!      Enfermedades virales en los adultos    Viral Illness, Adult      Los virus son microbios diminutos que entran en el organismo de kristin persona y causan enfermedades. Hay muchos tipos de virus diferentes y causan muchas clases de enfermedades. Las enfermedades virales pueden ser leves o graves. Pueden afectar diferentes partes del cuerpo.    Entre las afecciones a corto plazo causadas por virus, se incluyen los resfríos y la gripe. Entre las afecciones a vonda plazo causadas por un virus, incluyen el herpes, la culebrilla y la infección por VIH (virus de inmunodeficiencia humana). Se medina identificado unos pocos virus asociados con determinados tipos de cáncer.      ¿Cuáles son las causas?    Muchos tipos de virus pueden causar enfermedades. Los virus invaden las células del organismo, se multiplican y provocan que las células infectadas funcionen de manera anormal o mueran. Cuando estas células mueren, liberan más virus. Cuando esto ocurre, aparecen síntomas de la enfermedad, y el virus sigue diseminándose a otras células. Si el virus asume la función de la célula, puede hacer que esta se divida y prolifere de manera descontrolada. Institute ocurre cuando un virus causa cáncer.    Los diferentes virus ingresan al organismo de distintas formas. Puede contraer un virus de la siguiente manera:  •Al ingerir alimentos o beber agua que medina entrado en contacto con el virus (están contaminados).      •Al inhalar gotitas que kristin persona infectada liberó en el aire al toser o estornudar.      •Al tocar kristin superficie contaminada con el virus y luego llevarse la mano a la boca, la nariz o los ojos.      •Al ser kaleigh por un insecto o mordido por un animal que son portadores del virus.      •Al tener contacto sexual con kristin persona infectada por el virus.      •Al tener contacto con diana o líquidos que contienen el virus, ya sea a través de un candis abierto o alejandra kristin transfusión.      Si el virus ingresa al organismo, el sistema de defensa del cuerpo (sistema inmunitario) intentará combatirlo. Puede correr un riesgo más alto de tener kristin enfermedad viral si tiene el sistema inmunitario debilitado.      ¿Cuáles son los signos o síntomas?    Puede tener los siguientes síntomas, dependiendo del tipo de virus y de la ubicación de las células que invade:•Virus del resfrío y de la gripe:  •Fiebre.      •Dolor de chaya.      •Dolor de garganta.      •Chaparrita musculares.      •Nariz tapada (congestión nasal).      •Tos.      •Virus del aparato digestivo (gastrointestinales):  •Fiebre.      •Dolor en el abdomen.      •Náuseas.      •Diarrea.      •Virus hepáticos (hepatitis):  •Pérdida del apetito.      •Cansancio.      •La piel o las partes polo de los ojos se ponen radha (ictericia).      •Virus del cerebro y la médula burrows:  •Fiebre.      •Dolor de chaya.      •Rigidez en el tutu.      •Náuseas y vómitos.      •Confusión o somnolencia.      •Virus de la piel:  •Verrugas.      •Picazón.      •Erupción cutánea.      •Virus de transmisión sexual:  •Secreción.      •Hinchazón.      •Enrojecimiento.      •Erupción cutánea.          ¿Cómo se diagnostica?    Esta afección se puede diagnosticar en función de lo siguiente:  •Síntomas.      •Antecedentes médicos.      •Examen físico.      •Análisis de diana, kristin muestra de mucosidad de los pulmones (muestra de esputo), muestra de heces o un hisopado de líquidos corporales o kristin llaga de la piel (lesión).        ¿Cómo se trata?    Los virus pueden ser difíciles de tratar porque se hospedan en las células. Los antibióticos no tratan los virus porque estos medicamentos no llegan al interior de las células. El tratamiento de kristin enfermedad viral puede incluir lo siguiente:  •Descansar y beber abundantes líquidos.      •Medicamentos para aliviar los síntomas. Estos pueden incluir medicamentos de venta ivonne para el dolor y la fiebre, medicamentos para la tos o la congestión y medicamentos para aliviar la diarrea.      •Medicamentos antivirales. Estos medicamentos están disponibles únicamente para ciertos tipos de virus.      Algunas enfermedades virales pueden evitarse con vacunas. Un ejemplo frecuente es la vacuna antigripal.      Siga estas instrucciones en gann casa:    Medicamentos     •Use los medicamentos de venta ivonne y los recetados solamente nell se lo haya indicado el médico.      •Si le recetaron un medicamento antiviral, tómelo nell se lo haya indicado el médico. No deje de sagar el antiviral aunque comience a sentirse mejor.    •Infórmese sobre cuándo los antibióticos son necesarios y cuándo no. Los antibióticos no combaten a los virus. Si tiene kristin infección viral y el médico jose alejandro que también tiene kristin infección bacteriana, o que está en riesgo de contraerla, get vez le recete un antibiótico.  •No solicite kristin receta para antibióticos si le medina diagnosticado kristin enfermedad viral. Los antibióticos no harán que se cure más rápidamente.      •Sagar antibióticos con frecuencia cuando no son necesarios puede derivar en resistencia a los antibióticos. Cuando esto ocurre, el medicamento pierde gann eficacia contra las bacterias que normalmente combate.          Indicaciones generales      •Tanesha suficiente líquido para mantener la orina de color amarillo pálido.      •Descanse todo lo que pueda.      •Retome alexa actividades normales según lo indicado por el médico. Pregúntele al médico qué actividades son seguras para usted.      •Concurra a todas las visitas de seguimiento nell se lo haya indicado el médico. Institute es importante.        ¿Cómo se previene?     Para reducir el riesgo de tener kristin enfermedad viral:  •Lávese las luis frecuentemente con agua y jabón alejandra al menos 20 segundos. Use desinfectante para luis si no dispone de agua y jabón.      •Evite tocarse la nariz, los ojos y la boca, sobre todo si no se lavó las luis recientemente.      •Si un miembro de la jacob tiene kristin infección viral, limpie todas las superficies de la casa que puedan kelly estado en contacto con el virus. Use [place] y jabón. También puede usar lejía con agua agregada (diluido).      •Manténgase lejos de las personas enfermas con síntomas de kristin infección viral.      •No comparta objetos tales nell cepillos de dientes y botellas de agua con otras personas.      •Mantenga las vacunas al día. Institute incluye recibir la vacuna antigripal todos los años.      •Siga kristin dieta saludable y descanse lo suficiente.        Comuníquese con un médico si:    •Tiene síntomas de kristin enfermedad viral que no desaparecen.      •Los síntomas regresan después de kelly desaparecido.      •Alexa síntomas empeoran.        Solicite ayuda de inmediato si tiene:    •Dificultad para respirar.      •Dolor de chaya intenso o rigidez en el tutu.      •Vómitos abundantes o dolor en el abdomen.      Estos síntomas pueden representar un problema grave que constituye kristin emergencia. No espere a lorraine si los síntomas desaparecen. Solicite atención médica de inmediato. Comuníquese con el servicio de emergencias de gann localidad (911 en los [place]). No conduzca por alexa propios medios hasta el hospital.       Resumen    •Los virus son tipos de microbios que entran en el organismo de kristin persona y causan enfermedades. Las enfermedades virales pueden ser leves o graves. Pueden afectar diferentes partes del cuerpo.      •Los virus pueden ser difíciles de tratar. Hay medicamentos para aliviar los síntomas y hay algunos medicamentos antivirales.      •Si le recetaron un medicamento antiviral, tómelo nell se lo haya indicado el médico. No deje de sagar el antiviral aunque comience a sentirse mejor.      •Comuníquese con un médico si tiene síntomas de kristin enfermedad viral que no desaparecen.      Esta información no tiene nell fin reemplazar el consejo del médico. Asegúrese de hacerle al médico cualquier pregunta que tenga.

## 2022-12-04 NOTE — ED ADULT TRIAGE NOTE - CHIEF COMPLAINT QUOTE
Pt states she is 5 weeks pregnant and having L flank pain and vaginal spotting. Pt denies urinary symptoms, fever.

## 2022-12-04 NOTE — ED STATDOCS - NS ED ATTENDING STATEMENT MOD
This was a shared visit with the CATIA. I reviewed and verified the documentation and independently performed the documented:

## 2022-12-04 NOTE — ED STATDOCS - PROGRESS NOTE DETAILS
PT evaluated by intake physician. HPI/PE/ROS as noted above. Will follow up plan per intake physician. Pt advised of results. Still complaining of L flank pain. Renal US ordered. ELEONORA Richardson: Received during sign out. Does not wish to wait for US renal. All results reviewed with patient. Follow up with HR for repeat blood/US. No cardiac activity. Possible nonviable pregnancy.

## 2022-12-04 NOTE — ED STATDOCS - CLINICAL SUMMARY MEDICAL DECISION MAKING FREE TEXT BOX
Will check US to eval for threatened  vs ectopic. Has not had a confirmatory IUP yet p/w vaginal spotting. Check labs, UA, also now offer URI for flu like symptoms. Will check US to eval for threatened  vs ectopic. Has not had a confirmatory IUP yet p/w vaginal spotting. Check labs, UA,   + flu like symptoms.

## 2022-12-04 NOTE — ED STATDOCS - PHYSICAL EXAMINATION
Head: atraumatic, normacephalic  Face: atraumatic, no crepitus no orbiral/maxillary/mandibular ttp  throat: uvula midline no exudates  eyes: perrla eomi  heart: rrr s1s2  lungs: ctab  abd: soft, nt nd +bs no rebound/guarding no cva ttp  skin: warm  LE: no swelling, no calf ttp  back: no midline cervical/thoracic/lumbar ttp. Reproducible left back pain.

## 2022-12-04 NOTE — ED ADULT NURSE NOTE - OBJECTIVE STATEMENT
Patient presented to Ed complaining of lower abdominal pain radiating to the left flank, as well as flu like symptoms. Patient stated she is 5-6 weeks pregnant.

## 2022-12-04 NOTE — ED STATDOCS - ATTENDING APP SHARED VISIT CONTRIBUTION OF CARE
I, Micki Her, performed the initial face to face bedside interview with this patient regarding history of present illness, review of symptoms and relevant past medical, social and family history.  I completed an independent physical examination.  I was the initial provider who evaluated this patient. I have signed out the follow up of any pending tests (i.e. labs, radiological studies) to the ACP.  I have communicated the patient’s plan of care and disposition with the ACP.

## 2022-12-04 NOTE — ED ADULT NURSE REASSESSMENT NOTE - NS ED NURSE REASSESS COMMENT FT1
received report from day RN, assumed care of pt at change of shift.  pt is here for L flank pain and vaginal spotting.  no s/s acute distress noted.  denies fever, nausea, vomiting, dysuria, abd pain.

## 2022-12-04 NOTE — ED STATDOCS - OBJECTIVE STATEMENT
31 y/o female  5-6 wks pregnant w/ hx of asthma p/w vaginal spotting, lower abdominal pain ranging to the left flank, as well as flu like symptoms x 1 day.   Pt reports noticing brown-colored spotting yesterday morning and states LMP . She has seen an OB for pregnancy and denies a confirmatory IUP. Pt additionally p/w cough, sore throat, fever, and headache following sick contacts at home.    Denies c/n/v/cp/sob/palpitations/rash/headache/dizziness/abd.pain/d/c/dysuria/hematuria 33 y/o female  5-6 wks pregnant w/ hx of asthma p/w vaginal spotting, lower abdominal pain ranging to the left flank, as well as flu like symptoms x 1 day.   Pt reports noticing brown-colored spotting yesterday morning and states LMP . She has seen an OB for pregnancy and denies a confirmatory IUP. Pt additionally p/w cough, sore throat, fever, and headache following sick contacts at home.    Denies c/n/v/cp/sob/palpitations/rash/headache/dizziness//d/c/dysuria/hematuria

## 2022-12-05 LAB
CULTURE RESULTS: SIGNIFICANT CHANGE UP
SPECIMEN SOURCE: SIGNIFICANT CHANGE UP

## 2022-12-07 ENCOUNTER — EMERGENCY (EMERGENCY)
Facility: HOSPITAL | Age: 32
LOS: 1 days | Discharge: HOME | End: 2022-12-07
Attending: EMERGENCY MEDICINE
Payer: COMMERCIAL

## 2022-12-07 ENCOUNTER — APPOINTMENT (OUTPATIENT)
Dept: ANTEPARTUM | Facility: CLINIC | Age: 32
End: 2022-12-07

## 2022-12-07 VITALS
SYSTOLIC BLOOD PRESSURE: 122 MMHG | HEIGHT: 66 IN | OXYGEN SATURATION: 100 % | RESPIRATION RATE: 18 BRPM | DIASTOLIC BLOOD PRESSURE: 92 MMHG | TEMPERATURE: 98 F | WEIGHT: 193.57 LBS | HEART RATE: 96 BPM

## 2022-12-07 LAB
ALBUMIN SERPL ELPH-MCNC: 4.2 G/DL — SIGNIFICANT CHANGE UP (ref 3.3–5.2)
ALP SERPL-CCNC: 55 U/L — SIGNIFICANT CHANGE UP (ref 40–120)
ALT FLD-CCNC: 35 U/L — HIGH
ANION GAP SERPL CALC-SCNC: 15 MMOL/L — SIGNIFICANT CHANGE UP (ref 5–17)
APPEARANCE UR: ABNORMAL
AST SERPL-CCNC: 26 U/L — SIGNIFICANT CHANGE UP
BACTERIA # UR AUTO: ABNORMAL
BASOPHILS # BLD AUTO: 0.04 K/UL — SIGNIFICANT CHANGE UP (ref 0–0.2)
BASOPHILS NFR BLD AUTO: 0.4 % — SIGNIFICANT CHANGE UP (ref 0–2)
BILIRUB SERPL-MCNC: <0.2 MG/DL — LOW (ref 0.4–2)
BILIRUB UR-MCNC: NEGATIVE — SIGNIFICANT CHANGE UP
BUN SERPL-MCNC: 6.8 MG/DL — LOW (ref 8–20)
CALCIUM SERPL-MCNC: 9.2 MG/DL — SIGNIFICANT CHANGE UP (ref 8.4–10.5)
CHLORIDE SERPL-SCNC: 103 MMOL/L — SIGNIFICANT CHANGE UP (ref 96–108)
CO2 SERPL-SCNC: 21 MMOL/L — LOW (ref 22–29)
COLOR SPEC: YELLOW — SIGNIFICANT CHANGE UP
CREAT SERPL-MCNC: 0.56 MG/DL — SIGNIFICANT CHANGE UP (ref 0.5–1.3)
DIFF PNL FLD: ABNORMAL
EGFR: 124 ML/MIN/1.73M2 — SIGNIFICANT CHANGE UP
EOSINOPHIL # BLD AUTO: 0.09 K/UL — SIGNIFICANT CHANGE UP (ref 0–0.5)
EOSINOPHIL NFR BLD AUTO: 0.8 % — SIGNIFICANT CHANGE UP (ref 0–6)
EPI CELLS # UR: SIGNIFICANT CHANGE UP
GLUCOSE SERPL-MCNC: 91 MG/DL — SIGNIFICANT CHANGE UP (ref 70–99)
GLUCOSE UR QL: NEGATIVE MG/DL — SIGNIFICANT CHANGE UP
HCG SERPL-ACNC: 4985 MIU/ML — HIGH
HCT VFR BLD CALC: 39.6 % — SIGNIFICANT CHANGE UP (ref 34.5–45)
HGB BLD-MCNC: 13.7 G/DL — SIGNIFICANT CHANGE UP (ref 11.5–15.5)
IMM GRANULOCYTES NFR BLD AUTO: 0.6 % — SIGNIFICANT CHANGE UP (ref 0–0.9)
KETONES UR-MCNC: NEGATIVE — SIGNIFICANT CHANGE UP
LEUKOCYTE ESTERASE UR-ACNC: NEGATIVE — SIGNIFICANT CHANGE UP
LYMPHOCYTES # BLD AUTO: 2.8 K/UL — SIGNIFICANT CHANGE UP (ref 1–3.3)
LYMPHOCYTES # BLD AUTO: 26 % — SIGNIFICANT CHANGE UP (ref 13–44)
MCHC RBC-ENTMCNC: 29.8 PG — SIGNIFICANT CHANGE UP (ref 27–34)
MCHC RBC-ENTMCNC: 34.6 GM/DL — SIGNIFICANT CHANGE UP (ref 32–36)
MCV RBC AUTO: 86.3 FL — SIGNIFICANT CHANGE UP (ref 80–100)
MONOCYTES # BLD AUTO: 0.69 K/UL — SIGNIFICANT CHANGE UP (ref 0–0.9)
MONOCYTES NFR BLD AUTO: 6.4 % — SIGNIFICANT CHANGE UP (ref 2–14)
NEUTROPHILS # BLD AUTO: 7.09 K/UL — SIGNIFICANT CHANGE UP (ref 1.8–7.4)
NEUTROPHILS NFR BLD AUTO: 65.8 % — SIGNIFICANT CHANGE UP (ref 43–77)
NITRITE UR-MCNC: NEGATIVE — SIGNIFICANT CHANGE UP
PH UR: 7 — SIGNIFICANT CHANGE UP (ref 5–8)
PLATELET # BLD AUTO: 335 K/UL — SIGNIFICANT CHANGE UP (ref 150–400)
POTASSIUM SERPL-MCNC: 4.2 MMOL/L — SIGNIFICANT CHANGE UP (ref 3.5–5.3)
POTASSIUM SERPL-SCNC: 4.2 MMOL/L — SIGNIFICANT CHANGE UP (ref 3.5–5.3)
PROT SERPL-MCNC: 7 G/DL — SIGNIFICANT CHANGE UP (ref 6.6–8.7)
PROT UR-MCNC: 30 MG/DL
RBC # BLD: 4.59 M/UL — SIGNIFICANT CHANGE UP (ref 3.8–5.2)
RBC # FLD: 13.2 % — SIGNIFICANT CHANGE UP (ref 10.3–14.5)
RBC CASTS # UR COMP ASSIST: >50 /HPF (ref 0–4)
SODIUM SERPL-SCNC: 139 MMOL/L — SIGNIFICANT CHANGE UP (ref 135–145)
SP GR SPEC: 1.01 — SIGNIFICANT CHANGE UP (ref 1.01–1.02)
UROBILINOGEN FLD QL: NEGATIVE MG/DL — SIGNIFICANT CHANGE UP
WBC # BLD: 10.77 K/UL — HIGH (ref 3.8–10.5)
WBC # FLD AUTO: 10.77 K/UL — HIGH (ref 3.8–10.5)
WBC UR QL: SIGNIFICANT CHANGE UP /HPF (ref 0–5)

## 2022-12-07 PROCEDURE — 87086 URINE CULTURE/COLONY COUNT: CPT

## 2022-12-07 PROCEDURE — 36415 COLL VENOUS BLD VENIPUNCTURE: CPT

## 2022-12-07 PROCEDURE — 81001 URINALYSIS AUTO W/SCOPE: CPT

## 2022-12-07 PROCEDURE — 85025 COMPLETE CBC W/AUTO DIFF WBC: CPT

## 2022-12-07 PROCEDURE — 80053 COMPREHEN METABOLIC PANEL: CPT

## 2022-12-07 PROCEDURE — 76801 OB US < 14 WKS SINGLE FETUS: CPT | Mod: 26

## 2022-12-07 PROCEDURE — 99285 EMERGENCY DEPT VISIT HI MDM: CPT

## 2022-12-07 PROCEDURE — 76817 TRANSVAGINAL US OBSTETRIC: CPT

## 2022-12-07 PROCEDURE — 76801 OB US < 14 WKS SINGLE FETUS: CPT

## 2022-12-07 PROCEDURE — 99284 EMERGENCY DEPT VISIT MOD MDM: CPT

## 2022-12-07 PROCEDURE — 84702 CHORIONIC GONADOTROPIN TEST: CPT

## 2022-12-07 PROCEDURE — 76817 TRANSVAGINAL US OBSTETRIC: CPT | Mod: 26

## 2022-12-07 RX ORDER — ACETAMINOPHEN 500 MG
1000 TABLET ORAL ONCE
Refills: 0 | Status: DISCONTINUED | OUTPATIENT
Start: 2022-12-07 | End: 2022-12-15

## 2022-12-07 NOTE — ED ADULT TRIAGE NOTE - CHIEF COMPLAINT QUOTE
PT presents to ED for vaginal bleeding and cramping in pregnancy. States 6 weeks pregnant. LMP 9/17. States here sunday for similar problems

## 2022-12-07 NOTE — ED STATDOCS - PROGRESS NOTE DETAILS
PT evaluated by intake physician. HPI/PE/ROS as noted above. Will follow up plan per intake physician. Pain is improved after tylenol. Discussed labs and US results with pt. States has some vaginal bleeding when urinating, otherwise no saturated pads. Hgb unchanged from last visit. Will need ob FU in 2-3 days. Discussed need to FU for repeat US and labs. Discussed ED return precautions.

## 2022-12-07 NOTE — ED ADULT NURSE NOTE - OBJECTIVE STATEMENT
Pt 6 weeks pregnant comes in complaining of heavy vaginal bleeding x 3 days. Pt has had 3 other pregnancies in the past and says this has never happened to her before. Pt also endorsing pelvic pain.

## 2022-12-07 NOTE — ED STATDOCS - PATIENT PORTAL LINK FT
You can access the FollowMyHealth Patient Portal offered by [facility] by registering at the following website: [email or web address]. By joining [employer]’s FollowMyHealth portal, you will also be able to view your health information using other applications (apps) compatible with our system.

## 2022-12-07 NOTE — ED STATDOCS - CLINICAL SUMMARY MEDICAL DECISION MAKING FREE TEXT BOX
LNMP 9/17/22 with no heart beat in 6 week gestation 3 days ago. Will check HCG if downtrending, and likely repeat sono, and re-assess.

## 2022-12-07 NOTE — ED STATDOCS - OBJECTIVE STATEMENT
33 y/o female  with LNMP of 22 was here 3 days ago with a sono showing fetal pole and 6 week pregnancy with no heartbeat presents for continued vaginal bleeding and lower abdominal cramping, with concern for the baby. States she was sent by [facility]. Denies dysuria, other new complaints.     LNMP: 22  : Amber

## 2022-12-07 NOTE — ED STATDOCS - ATTENDING APP SHARED VISIT CONTRIBUTION OF CARE
Sanaz: I performed a face to face bedside interview with patient regarding history of present illness, review of symptoms and past medical history. I completed an independent physical exam and ordered tests/medications as needed.  I have discussed patient's plan of care with advanced care provider. The advanced care provider assisted in  executing the discussed plan. I was available for any questions or issues that may have arose during the execution of the plan of care.

## 2022-12-07 NOTE — ED STATDOCS - NS ED ROS FT
ROS: (+) vaginal bleeding, abdominal cramping; No fever/chills. No eye pain/changes in vision, No ear pain/sore throat/dysphagia, No chest pain/palpitations. No SOB/cough/. No N/V/D, no black/bloody bm. No dysuria/frequency/discharge, No headache. No Dizziness.    No rashes or breaks in skin. No numbness/tingling/weakness.

## 2022-12-07 NOTE — ED STATDOCS - NSFOLLOWUPINSTRUCTIONS_ED_ALL_ED_FT
Follow up with OB in 2-3 days for repeat ultrasound and blood work.   Return for any worsening symptoms or if bleeding more than 1 pad per hour.       Seguimiento con OB en 2-3 días para repetición de ultrasonido y análisis de diana.  Regrese por cualquier síntoma que empeore o si sangra más de 1 toalla sanitaria por hora.

## 2022-12-08 ENCOUNTER — EMERGENCY (EMERGENCY)
Facility: HOSPITAL | Age: 32
LOS: 1 days | Discharge: HOME | End: 2022-12-08
Attending: EMERGENCY MEDICINE
Payer: COMMERCIAL

## 2022-12-08 VITALS
OXYGEN SATURATION: 100 % | RESPIRATION RATE: 22 BRPM | SYSTOLIC BLOOD PRESSURE: 133 MMHG | DIASTOLIC BLOOD PRESSURE: 73 MMHG | TEMPERATURE: 99 F | WEIGHT: 195.11 LBS | HEART RATE: 105 BPM | HEIGHT: 66 IN

## 2022-12-08 PROCEDURE — 99285 EMERGENCY DEPT VISIT HI MDM: CPT

## 2022-12-08 RX ORDER — ACETAMINOPHEN 500 MG
1000 TABLET ORAL ONCE
Refills: 0 | Status: COMPLETED | OUTPATIENT
Start: 2022-12-08 | End: 2022-12-08

## 2022-12-08 RX ORDER — SODIUM CHLORIDE 9 MG/ML
1000 INJECTION INTRAMUSCULAR; INTRAVENOUS; SUBCUTANEOUS ONCE
Refills: 0 | Status: COMPLETED | OUTPATIENT
Start: 2022-12-08 | End: 2022-12-08

## 2022-12-08 RX ORDER — MORPHINE SULFATE 50 MG/1
4 CAPSULE, EXTENDED RELEASE ORAL ONCE
Refills: 0 | Status: DISCONTINUED | OUTPATIENT
Start: 2022-12-08 | End: 2022-12-08

## 2022-12-08 RX ADMIN — Medication 400 MILLIGRAM(S): at 23:40

## 2022-12-08 RX ADMIN — MORPHINE SULFATE 4 MILLIGRAM(S): 50 CAPSULE, EXTENDED RELEASE ORAL at 23:41

## 2022-12-08 RX ADMIN — SODIUM CHLORIDE 1000 MILLILITER(S): 9 INJECTION INTRAMUSCULAR; INTRAVENOUS; SUBCUTANEOUS at 23:40

## 2022-12-08 NOTE — ED ADULT TRIAGE NOTE - CHIEF COMPLAINT QUOTE
C/O vaginal bleeding following a miscarriage yesterday. Pt was 2 months pregnant. Reports going through 1-2 pads per hour with clots. Reports severe lower abd pain.

## 2022-12-09 VITALS
HEART RATE: 72 BPM | SYSTOLIC BLOOD PRESSURE: 110 MMHG | OXYGEN SATURATION: 100 % | RESPIRATION RATE: 18 BRPM | TEMPERATURE: 99 F | DIASTOLIC BLOOD PRESSURE: 70 MMHG

## 2022-12-09 LAB
ALBUMIN SERPL ELPH-MCNC: 4.3 G/DL — SIGNIFICANT CHANGE UP (ref 3.3–5.2)
ALP SERPL-CCNC: 54 U/L — SIGNIFICANT CHANGE UP (ref 40–120)
ALT FLD-CCNC: 30 U/L — SIGNIFICANT CHANGE UP
ANION GAP SERPL CALC-SCNC: 12 MMOL/L — SIGNIFICANT CHANGE UP (ref 5–17)
AST SERPL-CCNC: 25 U/L — SIGNIFICANT CHANGE UP
BASOPHILS # BLD AUTO: 0.04 K/UL — SIGNIFICANT CHANGE UP (ref 0–0.2)
BASOPHILS # BLD AUTO: 0.05 K/UL — SIGNIFICANT CHANGE UP (ref 0–0.2)
BASOPHILS NFR BLD AUTO: 0.4 % — SIGNIFICANT CHANGE UP (ref 0–2)
BASOPHILS NFR BLD AUTO: 0.5 % — SIGNIFICANT CHANGE UP (ref 0–2)
BILIRUB SERPL-MCNC: <0.2 MG/DL — LOW (ref 0.4–2)
BLD GP AB SCN SERPL QL: SIGNIFICANT CHANGE UP
BUN SERPL-MCNC: 7.3 MG/DL — LOW (ref 8–20)
CALCIUM SERPL-MCNC: 9 MG/DL — SIGNIFICANT CHANGE UP (ref 8.4–10.5)
CHLORIDE SERPL-SCNC: 103 MMOL/L — SIGNIFICANT CHANGE UP (ref 96–108)
CO2 SERPL-SCNC: 21 MMOL/L — LOW (ref 22–29)
CREAT SERPL-MCNC: 0.63 MG/DL — SIGNIFICANT CHANGE UP (ref 0.5–1.3)
CULTURE RESULTS: SIGNIFICANT CHANGE UP
EGFR: 120 ML/MIN/1.73M2 — SIGNIFICANT CHANGE UP
EOSINOPHIL # BLD AUTO: 0.07 K/UL — SIGNIFICANT CHANGE UP (ref 0–0.5)
EOSINOPHIL # BLD AUTO: 0.11 K/UL — SIGNIFICANT CHANGE UP (ref 0–0.5)
EOSINOPHIL NFR BLD AUTO: 0.6 % — SIGNIFICANT CHANGE UP (ref 0–6)
EOSINOPHIL NFR BLD AUTO: 1 % — SIGNIFICANT CHANGE UP (ref 0–6)
GLUCOSE SERPL-MCNC: 114 MG/DL — HIGH (ref 70–99)
HCG SERPL-ACNC: 2975 MIU/ML — HIGH
HCT VFR BLD CALC: 34.3 % — LOW (ref 34.5–45)
HCT VFR BLD CALC: 37.4 % — SIGNIFICANT CHANGE UP (ref 34.5–45)
HGB BLD-MCNC: 11.5 G/DL — SIGNIFICANT CHANGE UP (ref 11.5–15.5)
HGB BLD-MCNC: 12.6 G/DL — SIGNIFICANT CHANGE UP (ref 11.5–15.5)
IMM GRANULOCYTES NFR BLD AUTO: 0.5 % — SIGNIFICANT CHANGE UP (ref 0–0.9)
IMM GRANULOCYTES NFR BLD AUTO: 0.6 % — SIGNIFICANT CHANGE UP (ref 0–0.9)
LYMPHOCYTES # BLD AUTO: 2.64 K/UL — SIGNIFICANT CHANGE UP (ref 1–3.3)
LYMPHOCYTES # BLD AUTO: 23.7 % — SIGNIFICANT CHANGE UP (ref 13–44)
LYMPHOCYTES # BLD AUTO: 3.45 K/UL — HIGH (ref 1–3.3)
LYMPHOCYTES # BLD AUTO: 32 % — SIGNIFICANT CHANGE UP (ref 13–44)
MCHC RBC-ENTMCNC: 28.7 PG — SIGNIFICANT CHANGE UP (ref 27–34)
MCHC RBC-ENTMCNC: 29.1 PG — SIGNIFICANT CHANGE UP (ref 27–34)
MCHC RBC-ENTMCNC: 33.5 GM/DL — SIGNIFICANT CHANGE UP (ref 32–36)
MCHC RBC-ENTMCNC: 33.7 GM/DL — SIGNIFICANT CHANGE UP (ref 32–36)
MCV RBC AUTO: 85.2 FL — SIGNIFICANT CHANGE UP (ref 80–100)
MCV RBC AUTO: 86.8 FL — SIGNIFICANT CHANGE UP (ref 80–100)
MONOCYTES # BLD AUTO: 0.68 K/UL — SIGNIFICANT CHANGE UP (ref 0–0.9)
MONOCYTES # BLD AUTO: 0.7 K/UL — SIGNIFICANT CHANGE UP (ref 0–0.9)
MONOCYTES NFR BLD AUTO: 6.3 % — SIGNIFICANT CHANGE UP (ref 2–14)
MONOCYTES NFR BLD AUTO: 6.3 % — SIGNIFICANT CHANGE UP (ref 2–14)
NEUTROPHILS # BLD AUTO: 6.43 K/UL — SIGNIFICANT CHANGE UP (ref 1.8–7.4)
NEUTROPHILS # BLD AUTO: 7.63 K/UL — HIGH (ref 1.8–7.4)
NEUTROPHILS NFR BLD AUTO: 59.6 % — SIGNIFICANT CHANGE UP (ref 43–77)
NEUTROPHILS NFR BLD AUTO: 68.5 % — SIGNIFICANT CHANGE UP (ref 43–77)
PLATELET # BLD AUTO: 303 K/UL — SIGNIFICANT CHANGE UP (ref 150–400)
PLATELET # BLD AUTO: 350 K/UL — SIGNIFICANT CHANGE UP (ref 150–400)
POTASSIUM SERPL-MCNC: 3.7 MMOL/L — SIGNIFICANT CHANGE UP (ref 3.5–5.3)
POTASSIUM SERPL-SCNC: 3.7 MMOL/L — SIGNIFICANT CHANGE UP (ref 3.5–5.3)
PROT SERPL-MCNC: 7 G/DL — SIGNIFICANT CHANGE UP (ref 6.6–8.7)
RAPID RVP RESULT: SIGNIFICANT CHANGE UP
RBC # BLD: 3.95 M/UL — SIGNIFICANT CHANGE UP (ref 3.8–5.2)
RBC # BLD: 4.39 M/UL — SIGNIFICANT CHANGE UP (ref 3.8–5.2)
RBC # FLD: 13.1 % — SIGNIFICANT CHANGE UP (ref 10.3–14.5)
RBC # FLD: 13.2 % — SIGNIFICANT CHANGE UP (ref 10.3–14.5)
SARS-COV-2 RNA SPEC QL NAA+PROBE: SIGNIFICANT CHANGE UP
SODIUM SERPL-SCNC: 136 MMOL/L — SIGNIFICANT CHANGE UP (ref 135–145)
SPECIMEN SOURCE: SIGNIFICANT CHANGE UP
WBC # BLD: 10.78 K/UL — HIGH (ref 3.8–10.5)
WBC # BLD: 11.14 K/UL — HIGH (ref 3.8–10.5)
WBC # FLD AUTO: 10.78 K/UL — HIGH (ref 3.8–10.5)
WBC # FLD AUTO: 11.14 K/UL — HIGH (ref 3.8–10.5)

## 2022-12-09 PROCEDURE — 76801 OB US < 14 WKS SINGLE FETUS: CPT

## 2022-12-09 PROCEDURE — 36415 COLL VENOUS BLD VENIPUNCTURE: CPT

## 2022-12-09 PROCEDURE — 76817 TRANSVAGINAL US OBSTETRIC: CPT | Mod: 26

## 2022-12-09 PROCEDURE — 96365 THER/PROPH/DIAG IV INF INIT: CPT

## 2022-12-09 PROCEDURE — 96375 TX/PRO/DX INJ NEW DRUG ADDON: CPT

## 2022-12-09 PROCEDURE — 85025 COMPLETE CBC W/AUTO DIFF WBC: CPT

## 2022-12-09 PROCEDURE — 96361 HYDRATE IV INFUSION ADD-ON: CPT

## 2022-12-09 PROCEDURE — 84702 CHORIONIC GONADOTROPIN TEST: CPT

## 2022-12-09 PROCEDURE — 0225U NFCT DS DNA&RNA 21 SARSCOV2: CPT

## 2022-12-09 PROCEDURE — 76817 TRANSVAGINAL US OBSTETRIC: CPT

## 2022-12-09 PROCEDURE — 96376 TX/PRO/DX INJ SAME DRUG ADON: CPT

## 2022-12-09 PROCEDURE — 86900 BLOOD TYPING SEROLOGIC ABO: CPT

## 2022-12-09 PROCEDURE — 80053 COMPREHEN METABOLIC PANEL: CPT

## 2022-12-09 PROCEDURE — 86901 BLOOD TYPING SEROLOGIC RH(D): CPT

## 2022-12-09 PROCEDURE — 76801 OB US < 14 WKS SINGLE FETUS: CPT | Mod: 26

## 2022-12-09 PROCEDURE — 96366 THER/PROPH/DIAG IV INF ADDON: CPT

## 2022-12-09 PROCEDURE — 86850 RBC ANTIBODY SCREEN: CPT

## 2022-12-09 PROCEDURE — 99284 EMERGENCY DEPT VISIT MOD MDM: CPT | Mod: 25

## 2022-12-09 RX ORDER — OXYCODONE HYDROCHLORIDE 5 MG/1
5 TABLET ORAL ONCE
Refills: 0 | Status: DISCONTINUED | OUTPATIENT
Start: 2022-12-09 | End: 2022-12-09

## 2022-12-09 RX ORDER — HYDROMORPHONE HYDROCHLORIDE 2 MG/ML
1 INJECTION INTRAMUSCULAR; INTRAVENOUS; SUBCUTANEOUS ONCE
Refills: 0 | Status: DISCONTINUED | OUTPATIENT
Start: 2022-12-09 | End: 2022-12-09

## 2022-12-09 RX ORDER — MORPHINE SULFATE 50 MG/1
4 CAPSULE, EXTENDED RELEASE ORAL ONCE
Refills: 0 | Status: DISCONTINUED | OUTPATIENT
Start: 2022-12-09 | End: 2022-12-09

## 2022-12-09 RX ORDER — IBUPROFEN 200 MG
1 TABLET ORAL
Qty: 56 | Refills: 0
Start: 2022-12-09 | End: 2022-12-22

## 2022-12-09 RX ORDER — ONDANSETRON 8 MG/1
4 TABLET, FILM COATED ORAL ONCE
Refills: 0 | Status: DISCONTINUED | OUTPATIENT
Start: 2022-12-09 | End: 2022-12-16

## 2022-12-09 RX ORDER — SODIUM CHLORIDE 9 MG/ML
1000 INJECTION INTRAMUSCULAR; INTRAVENOUS; SUBCUTANEOUS ONCE
Refills: 0 | Status: DISCONTINUED | OUTPATIENT
Start: 2022-12-09 | End: 2022-12-09

## 2022-12-09 RX ORDER — SODIUM CHLORIDE 9 MG/ML
1000 INJECTION INTRAMUSCULAR; INTRAVENOUS; SUBCUTANEOUS ONCE
Refills: 0 | Status: COMPLETED | OUTPATIENT
Start: 2022-12-09 | End: 2022-12-09

## 2022-12-09 RX ORDER — ACETAMINOPHEN 500 MG
1000 TABLET ORAL ONCE
Refills: 0 | Status: COMPLETED | OUTPATIENT
Start: 2022-12-09 | End: 2022-12-09

## 2022-12-09 RX ORDER — OXYCODONE HYDROCHLORIDE 5 MG/1
1 TABLET ORAL
Qty: 9 | Refills: 0
Start: 2022-12-09 | End: 2022-12-11

## 2022-12-09 RX ORDER — KETOROLAC TROMETHAMINE 30 MG/ML
30 SYRINGE (ML) INJECTION ONCE
Refills: 0 | Status: DISCONTINUED | OUTPATIENT
Start: 2022-12-09 | End: 2022-12-09

## 2022-12-09 RX ADMIN — MORPHINE SULFATE 4 MILLIGRAM(S): 50 CAPSULE, EXTENDED RELEASE ORAL at 11:11

## 2022-12-09 RX ADMIN — MORPHINE SULFATE 4 MILLIGRAM(S): 50 CAPSULE, EXTENDED RELEASE ORAL at 04:54

## 2022-12-09 RX ADMIN — Medication 400 MILLIGRAM(S): at 04:50

## 2022-12-09 RX ADMIN — Medication 1000 MILLIGRAM(S): at 04:54

## 2022-12-09 RX ADMIN — SODIUM CHLORIDE 1000 MILLILITER(S): 9 INJECTION INTRAMUSCULAR; INTRAVENOUS; SUBCUTANEOUS at 04:54

## 2022-12-09 RX ADMIN — HYDROMORPHONE HYDROCHLORIDE 1 MILLIGRAM(S): 2 INJECTION INTRAMUSCULAR; INTRAVENOUS; SUBCUTANEOUS at 12:18

## 2022-12-09 RX ADMIN — SODIUM CHLORIDE 1000 MILLILITER(S): 9 INJECTION INTRAMUSCULAR; INTRAVENOUS; SUBCUTANEOUS at 11:05

## 2022-12-09 RX ADMIN — SODIUM CHLORIDE 1000 MILLILITER(S): 9 INJECTION INTRAMUSCULAR; INTRAVENOUS; SUBCUTANEOUS at 04:50

## 2022-12-09 RX ADMIN — Medication 30 MILLIGRAM(S): at 05:37

## 2022-12-09 RX ADMIN — Medication 30 MILLIGRAM(S): at 11:05

## 2022-12-09 NOTE — ED PROVIDER NOTE - PROGRESS NOTE DETAILS
POLO- us as described, GYN at bedside evaluating patient POLO- plan from GYN is to give cytotec 800 1 time dose and they will re-examine her approx 30 min after meds Ochoa: Pt reexamined by GYN team after cytotec. Pt now with open cervical os and is passing products of conception. They request CBC and will reevaluate pt to see if pt may need D & C. Edmundo: OB re-consulted for evaluation and recommendations. patient having lower pelvic pain after getting Cytotec. will give additional pain meds.

## 2022-12-09 NOTE — CONSULT NOTE ADULT - ASSESSMENT
33 yo  @ 11w6d by LMP 9/15/22 MOUNIKA 23 who presents to ED with abdominal cramping and heavy vaginal bleeding of 1d duration in setting of downtrending beta and TVUS findings consistent with expelling pregnancy. 33 yo  @ 11w6d by LMP 9/15/22 MOUNIKA 23 who presents to ED with abdominal cramping and heavy vaginal bleeding of 1d duration in setting of downtrending beta and TVUS findings consistent with SAB.    - VSS, stable H/H, minimal active bleeding on exam.  - Afebrile, no leukocytosis, and chills/clamminess resolved - per patient these symptoms were due to nerves not illness  - Toradol for pain q6h  - Cervix closed with abortus in MIRELLA vs endocervical canal c/w missed ab. Plan for buccal cytotec 800mg x1 with repeat vaginal exam 30 min post medication.    D/w Dr. Rg         33 yo  @ 11w6d by LMP 9/15/22 MOUNIKA 23 who presents to ED with abdominal cramping and heavy vaginal bleeding of 1d duration in setting of downtrending beta and TVUS findings consistent with SAB.    - VSS, stable H/H, minimal active bleeding on exam.  - Afebrile, no leukocytosis, and chills/clamminess resolved - per patient these symptoms were due to nerves   - Toradol for pain q6h  - Cervix closed with abortus in MIRELLA vs endocervical canal c/w missed ab. Plan for buccal cytotec 800mg x1 with repeat vaginal exam 30 min post medication.    D/w Dr. Rg         31 yo  @ 11w6d by LMP 9/15/22 MOUNIKA 23 who presents to ED with abdominal cramping and heavy vaginal bleeding of 1d duration in setting of downtrending beta and TVUS findings consistent with SAB.    - VSS, stable H/H, minimal active bleeding on exam.  - Afebrile, no leukocytosis, and chills/clamminess resolved - per patient these symptoms were due to nerves   - Toradol for pain q6h  - Cervix closed with abortus in MIRELLA vs endocervical canal c/w missed ab. Plan for buccal cytotec 800mg x1 with repeat vaginal exam 30 min post medication.    D/w Dr. Rg      Update:  - Re-evlauted s/p misoprostol - cervix dilated; attempt to remove POC from MIRELLA made; however, pt not tolerating exam well  - Bedside sono - fluid in the uterus; POC likely in MIRELLA although unable to clearly visualize  - Plan to re-evaluate in a couple hours; bleeding minimal at this time  - If bleeding becomes heavy; recommend official sono for evaluation for possible D&C in OR  - Pt feels subjectively warm and has chills; repeat CBC; viral panel ordered

## 2022-12-09 NOTE — ED PROVIDER NOTE - CLINICAL SUMMARY MEDICAL DECISION MAKING FREE TEXT BOX
33 y/o female  with LNMP of 22 was here 4 days ago with a sono showing fetal pole and 6 week pregnancy with no heartbeat presents and yesterday which sono showed Findings suspicious for pregnancy failure presenting to the ED for vaginal bleeding. likely miscarrying, us, labs, and OBGYN consult

## 2022-12-09 NOTE — ED PROVIDER NOTE - CONSTITUTIONAL, MLM
normal... Well appearing, awake, alert, oriented to person, place, time/situation and in obvious pain

## 2022-12-09 NOTE — ED PROVIDER NOTE - PATIENT PORTAL LINK FT
You can access the FollowMyHealth Patient Portal offered by [facility] by registering at the following website: [email or web address]. By joining [employer]’s FollowMyHealth portal, you will also be able to view your health information using other applications (apps) compatible with our system.

## 2022-12-09 NOTE — ED PROVIDER NOTE - ATTENDING APP SHARED VISIT CONTRIBUTION OF CARE
32y F presents with vaginal bleeding. Pt was just seen in this ED 4 days ago for vaginal spotting and was found to have 6 week pregnancy with no heartbeat. Pt now with worsening vaginal bleeding and pelvic cramping over the past day. Pt hemodynamically stable. Labs showing downtrending beta HCG. GYN consulted; pt given cytotec. To be reassessed to see it pt may require D & C.

## 2022-12-09 NOTE — ED PROVIDER NOTE - NSFOLLOWUPINSTRUCTIONS_ED_ALL_ED_FT
Followup with obstetrics doctor in 7 days. Return to emergency department if symptoms worsen, fever/chills, nausea or vomiting, worsening abdominal pain, chest pain/shortness of breath, worsening vaginal bleeding.     Seguimiento con médico obstetra en 7 días. Regrese a la hamilton de emergencias si los síntomas empeoran, fiebre/escalofríos, náuseas o vómitos, empeoramiento del dolor abdominal, dolor de pecho/dificultad para respirar, empeoramiento del sangrado vaginal.

## 2022-12-09 NOTE — ED PROVIDER NOTE - OBJECTIVE STATEMENT
33 y/o female  with LNMP of 22 was here 4 days ago with a sono showing fetal pole and 6 week pregnancy with no heartbeat presents and yesterday which sono showed Findings suspicious for pregnancy failure presenting to the ED for vaginal bleeding. Downtrending HCG. Pt notes she is going through 1-2 pads an hour. Passing large clots and what she describes as "tissue". Pt notes cramping pain approx 10/10 in intensity. Denies lightheadedness, dizziness, urinary symptoms.

## 2022-12-09 NOTE — ED PROVIDER NOTE - CARE PROVIDER_API CALL
Lizeth Strickland)  Obstetrics and Gynecology  [facility] [place] [address]  Phone: [phone]  Fax: [phone]  Follow Up Time: 4-6 Days

## 2022-12-09 NOTE — CONSULT NOTE ADULT - SUBJECTIVE AND OBJECTIVE BOX
33 yo  MOUNIKA 23 by LMP    Medhx: denies  OBhx: C-sect  for macrosomia, Emerg C-sect  at 36w (nuchal), Emerg C-sect  at 36w for pec  Surghx: Ovarian cystectomy   Sochx: denies    Vitals:   T(C): 37.1 (22 @ 23:00), Max: 37.1 (22 @ 23:00)  HR: 105 (22 @ 23:00) (105 - 105)  BP: 133/73 (22 @ 23:00) (133/73 - 133/73)  RR: 22 (22 @ 23:00) (22 - 22)  SpO2: 100% (22 @ 23:00) (100% - 100%)    General:   Abd:   Pelvic:    Labs:                        12.6   10.78 )-----------( 350      ( 08 Dec 2022 23:37 )             37.4     Hemoglobin: 13.7 g/dL (..22 @ 13:07)    Hemoglobin: 13.2 g/dL (12.04.22 @ 13:00)    ABO RH Interpretation: O POS (22 @ 23:29)        136  |  103  |  7.3<L>  ----------------------------<  114<H>  3.7   |  21.0<L>  |  0.63    Ca    9.0      08 Dec 2022 23:37    TPro  7.0  /  Alb  4.3  /  TBili  <0.2<L>  /  DBili  x   /  AST  25  /  ALT  30  /  AlkPhos  54  12-    SERUM bhcg    2975.0   @ 23:37  SERUM bhcg    4985.0   @ 13:07    HCG Quantitative, Serum: 7456.0      Radiology:    < from: US Transvaginal, OB (22 @ 15:50) >  IMPRESSION:  Previously identified fetal pole not definitively visualized on current   study. Findings suspicious for pregnancy failure.  Continued follow-up with serial hCG and ultrasound is recommended.    --- End of Report ---    < end of copied text >    < from: US Transvaginal, OB (22 @ 14:02) >  IMPRESSION:  Intrauterine gestational sac with fetal pole, consistent with 6 weeks, 0   days gestation. Fetal cardiac activity cannot definitely be identified,   probably due to small size. Continued follow-up with serial beta hCG and   follow-up pelvic ultrasound is recommended.    --- End of Report ---    < end of copied text >         33 yo  @ 11w6d by LMP 9/15/22 MOUNIKA 23 who presents to ED with abdominal cramping and heavy vaginal bleeding of 1d duration. Patient has been expectantly managed for likely SAB since . Patient reports she came to ED today because abdominal pain was becoming unbearable and bleeding was increasing in volume. She reports passing numerous dark reddish - black clots, 3x3cm in size, over past 24h. Reports lightheadedness, spots in vision, chills, nausea, decreased appetite. Denies palpitations, shortness of breath, syncope, fevers, purulent vaginal discharge.    Medhx: denies  OBhx: C-sect  for macrosomia, Emerg C-sect  at 36w (nuchal), Emerg C-sect  at 36w for pec  Surghx: Ovarian cystectomy   Sochx: denies    Vitals:   T(C): 37.1 (22 @ 23:00), Max: 37.1 (22 @ 23:00)  HR: 105 (22 @ 23:00) (105 - 105)  BP: 133/73 (22 @ 23:00) (133/73 - 133/73)  RR: 22 (22 @ 23:00) (22 - 22)  SpO2: 100% (22 @ 23:00) (100% - 100%)    General: shivering, tearful, diaphoretic  Abd: soft, nondistended,  +lower quadrant tenderness to palpation  SSE: 1x1cm dark red clot evacuated from vagina, small volume bright red blood pooling in vagina, minimal active bleeding per os on valsalva  SVE: uterine fundus tender to palpation, cervix FT/long/posterior  Ext: warm, clammy    Labs:                        12.6   10.78 )-----------( 350      ( 08 Dec 2022 23:37 )             37.4     Hemoglobin: 13.7 g/dL (..22 @ 13:07)    Hemoglobin: 13.2 g/dL (12.04.22 @ 13:00)    ABO RH Interpretation: O POS (22 @ 23:29)        136  |  103  |  7.3<L>  ----------------------------<  114<H>  3.7   |  21.0<L>  |  0.63    Ca    9.0      08 Dec 2022 23:37    TPro  7.0  /  Alb  4.3  /  TBili  <0.2<L>  /  DBili  x   /  AST  25  /  ALT  30  /  AlkPhos  54  12-    SERUM bhcg    2975.0  - @ 23:37  SERUM bhcg    4985.0   @ 13:07    HCG Quantitative, Serum: 7456.0      Radiology:    < from: US Transvaginal, OB (22 @ 15:50) >  IMPRESSION:  Previously identified fetal pole not definitively visualized on current   study. Findings suspicious for pregnancy failure.  Continued follow-up with serial hCG and ultrasound is recommended.    --- End of Report ---    < end of copied text >    < from: US Transvaginal, OB (22 @ 14:02) >  IMPRESSION:  Intrauterine gestational sac with fetal pole, consistent with 6 weeks, 0   days gestation. Fetal cardiac activity cannot definitely be identified,   probably due to small size. Continued follow-up with serial beta hCG and   follow-up pelvic ultrasound is recommended.    --- End of Report ---    < end of copied text >    < from: US Transvaginal, OB (22 @ 03:14) >  IMPRESSION:  Cystic lesion in the endocervical canal possibly representing an   expelling gestational sac. Distended endocervical canal with   heterogeneous echogenic material likely representing blood products.      --- End ofReport ---      < end of copied text >         33 yo  @ 11w6d by LMP 9/15/22 MOUNIKA 23 who presents to ED with abdominal cramping and heavy vaginal bleeding of 1d duration. Patient has been expectantly managed for likely SAB since . Patient reports she came to ED today because abdominal pain was becoming unbearable and bleeding was increasing in volume. She reports passing numerous dark reddish - black clots, 3x3cm in size, over past 24h. Reports lightheadedness, spots in vision, chills, nausea, decreased appetite. Denies palpitations, shortness of breath, syncope, fevers, purulent vaginal discharge.    Medhx: denies  OBhx: C-sect  for macrosomia, Emerg C-sect  at 36w (nuchal), Emerg C-sect  at 36w for pec  Surghx: Ovarian cystectomy   Sochx: denies    ICU Vital Signs Last 24 Hrs  T(C): 36.6 (09 Dec 2022 04:24), Max: 37.1 (08 Dec 2022 23:00)  T(F): 97.9 (09 Dec 2022 04:24), Max: 98.8 (08 Dec 2022 23:00)  HR: 76 (09 Dec 2022 04:24) (76 - 105)  BP: 102/68 (09 Dec 2022 04:24) (102/68 - 133/73)  BP(mean): 79 (09 Dec 2022 04:24) (79 - 79)  RR: 19 (09 Dec 2022 04:24) (19 - 22)  SpO2: 99% (09 Dec 2022 04:24) (99% - 100%)    General: shivering, tearful, diaphoretic  Abd: soft, nondistended,  +lower quadrant tenderness to palpation  SSE: 1x1cm dark red clot evacuated from vagina, small volume bright red blood pooling in vagina, minimal active bleeding per os on valsalva  SVE: uterine fundus tender to palpation, cervix FT/long/posterior  Ext: warm, clammy    Labs:                        12.6   10.78 )-----------( 350      ( 08 Dec 2022 23:37 )             37.4     Hemoglobin: 13.7 g/dL (22 @ 13:07)    Hemoglobin: 13.2 g/dL (22 @ 13:00)    ABO RH Interpretation: O POS (22 @ 23:29)        136  |  103  |  7.3<L>  ----------------------------<  114<H>  3.7   |  21.0<L>  |  0.63    Ca    9.0      08 Dec 2022 23:37    TPro  7.0  /  Alb  4.3  /  TBili  <0.2<L>  /  DBili  x   /  AST  25  /  ALT  30  /  AlkPhos  54      SERUM bhcg    2975.0   @ 23:37  SERUM bhcg    4985.0   @ 13:07    HCG Quantitative, Serum: 7456.0      Radiology:    < from: US Transvaginal, OB (22 @ 15:50) >  IMPRESSION:  Previously identified fetal pole not definitively visualized on current   study. Findings suspicious for pregnancy failure.  Continued follow-up with serial hCG and ultrasound is recommended.    --- End of Report ---    < end of copied text >    < from: US Transvaginal, OB (22 @ 14:02) >  IMPRESSION:  Intrauterine gestational sac with fetal pole, consistent with 6 weeks, 0   days gestation. Fetal cardiac activity cannot definitely be identified,   probably due to small size. Continued follow-up with serial beta hCG and   follow-up pelvic ultrasound is recommended.    --- End of Report ---    < end of copied text >    < from: US Transvaginal, OB (22 @ 03:14) >  IMPRESSION:  Cystic lesion in the endocervical canal possibly representing an   expelling gestational sac. Distended endocervical canal with   heterogeneous echogenic material likely representing blood products.      --- End ofReport ---      < end of copied text >         33 yo  @ 11w6d by LMP 9/15/22 MOUNIKA 23 who presents to ED with abdominal cramping and heavy vaginal bleeding of 1d duration. Patient has been expectantly managed for likely SAB since . Patient reports she came to ED today because abdominal pain was becoming unbearable and bleeding was increasing in volume. She reports passing numerous dark reddish - black clots, 3x3cm in size, over past 24h. Reports lightheadedness, spots in vision, chills, nausea, decreased appetite. Denies palpitations, shortness of breath, syncope, fevers, purulent vaginal discharge.    Medhx: denies  OBhx: C-sect  for macrosomia, Emerg C-sect  at 36w (nuchal), Emerg C-sect  at 36w for pec  Surghx: Ovarian cystectomy   Sochx: denies    ICU Vital Signs Last 24 Hrs  T(C): 36.6 (09 Dec 2022 04:24), Max: 37.1 (08 Dec 2022 23:00)  T(F): 97.9 (09 Dec 2022 04:24), Max: 98.8 (08 Dec 2022 23:00)  HR: 76 (09 Dec 2022 04:24) (76 - 105)  BP: 102/68 (09 Dec 2022 04:24) (102/68 - 133/73)  BP(mean): 79 (09 Dec 2022 04:24) (79 - 79)  RR: 19 (09 Dec 2022 04:24) (19 - 22)  SpO2: 99% (09 Dec 2022 04:24) (99% - 100%)    General: shivering, tearful, diaphoretic  Abd: soft, nondistended,  +lower quadrant tenderness to palpation  SSE: 1x1cm dark red clot evacuated from vagina, small volume bright red blood pooling in vagina, minimal active bleeding per os on valsalva  SVE: uterine fundus tender to palpation, cervix C/L/P  Ext: warm, clammy    Labs:                        12.6   10.78 )-----------( 350      ( 08 Dec 2022 23:37 )             37.4     Hemoglobin: 13.7 g/dL (22 @ 13:07)    Hemoglobin: 13.2 g/dL (22 @ 13:00)    ABO RH Interpretation: O POS (22 @ 23:29)        136  |  103  |  7.3<L>  ----------------------------<  114<H>  3.7   |  21.0<L>  |  0.63    Ca    9.0      08 Dec 2022 23:37    TPro  7.0  /  Alb  4.3  /  TBili  <0.2<L>  /  DBili  x   /  AST  25  /  ALT  30  /  AlkPhos  54      SERUM bhcg    2975.0   @ 23:37  SERUM bhcg    4985.0   @ 13:07    HCG Quantitative, Serum: 7456.0      Radiology:    < from: US Transvaginal, OB (22 @ 15:50) >  IMPRESSION:  Previously identified fetal pole not definitively visualized on current   study. Findings suspicious for pregnancy failure.  Continued follow-up with serial hCG and ultrasound is recommended.    --- End of Report ---    < end of copied text >    < from: US Transvaginal, OB (22 @ 14:02) >  IMPRESSION:  Intrauterine gestational sac with fetal pole, consistent with 6 weeks, 0   days gestation. Fetal cardiac activity cannot definitely be identified,   probably due to small size. Continued follow-up with serial beta hCG and   follow-up pelvic ultrasound is recommended.    --- End of Report ---    < end of copied text >    < from: US Transvaginal, OB (22 @ 03:14) >  IMPRESSION:  Cystic lesion in the endocervical canal possibly representing an   expelling gestational sac. Distended endocervical canal with   heterogeneous echogenic material likely representing blood products.      --- End ofReport ---      < end of copied text >         33 yo  @ 11w6d by LMP 9/15/22 MOUNIKA 23 who presents to ED with abdominal cramping and heavy vaginal bleeding of 1d duration. Patient has been expectantly managed for likely SAB since . Patient reports she came to ED today because abdominal pain was becoming unbearable and bleeding was increasing in volume. She reports passing numerous dark reddish - black clots, 3x3cm in size, over past 24h. Is uncertain if she passed any tissue. Reports lightheadedness, spots in vision, chills, nausea, decreased appetite. Denies palpitations, shortness of breath, syncope, fevers, purulent vaginal discharge.    Medhx: denies  OBhx: C-sect  for macrosomia, Emerg C-sect  at 36w (nuchal), Emerg C-sect  at 36w for pec  Surghx: Ovarian cystectomy   Sochx: denies    ICU Vital Signs Last 24 Hrs  T(C): 36.6 (09 Dec 2022 04:24), Max: 37.1 (08 Dec 2022 23:00)  T(F): 97.9 (09 Dec 2022 04:24), Max: 98.8 (08 Dec 2022 23:00)  HR: 76 (09 Dec 2022 04:24) (76 - 105)  BP: 102/68 (09 Dec 2022 04:24) (102/68 - 133/73)  BP(mean): 79 (09 Dec 2022 04:24) (79 - 79)  RR: 19 (09 Dec 2022 04:24) (19 - 22)  SpO2: 99% (09 Dec 2022 04:24) (99% - 100%)    General: shivering, tearful, diaphoretic  Abd: soft, nondistended,  +lower quadrant tenderness to palpation  SSE: 1x1cm dark red clot evacuated from vagina, small volume bright red blood pooling in vagina, minimal active bleeding per os on valsalva  SVE: uterine fundus mildly tender to palpation, cervix C/L/P  Ext: warm, clammy    Labs:                        12.6   10.78 )-----------( 350      ( 08 Dec 2022 23:37 )             37.4     Hemoglobin: 13.7 g/dL (12.07.22 @ 13:07)    Hemoglobin: 13.2 g/dL (22 @ 13:00)    ABO RH Interpretation: O POS (22 @ 23:29)        136  |  103  |  7.3<L>  ----------------------------<  114<H>  3.7   |  21.0<L>  |  0.63    Ca    9.0      08 Dec 2022 23:37    TPro  7.0  /  Alb  4.3  /  TBili  <0.2<L>  /  DBili  x   /  AST  25  /  ALT  30  /  AlkPhos  54      SERUM bhcg    2975.0   @ 23:37  SERUM bhcg    4985.0   @ 13:07    HCG Quantitative, Serum: 7456.0      Radiology:    < from: US Transvaginal, OB (22 @ 15:50) >  IMPRESSION:  Previously identified fetal pole not definitively visualized on current   study. Findings suspicious for pregnancy failure.  Continued follow-up with serial hCG and ultrasound is recommended.    --- End of Report ---    < end of copied text >    < from: US Transvaginal, OB (22 @ 14:02) >  IMPRESSION:  Intrauterine gestational sac with fetal pole, consistent with 6 weeks, 0   days gestation. Fetal cardiac activity cannot definitely be identified,   probably due to small size. Continued follow-up with serial beta hCG and   follow-up pelvic ultrasound is recommended.    --- End of Report ---    < end of copied text >    < from: US Transvaginal, OB (22 @ 03:14) >  IMPRESSION:  Cystic lesion in the endocervical canal possibly representing an   expelling gestational sac. Distended endocervical canal with   heterogeneous echogenic material likely representing blood products.      --- End ofReport ---      < end of copied text >

## 2022-12-09 NOTE — CHART NOTE - NSCHARTNOTEFT_GEN_A_CORE
first trimester spontaneous   s/p misoprostol 800mcg po  received toradol and tylenol  reports minimal pain  vitals stable  pelvic exam: scant old dark blood  Hb 11 stable    cleared gynecologically for discharge home with nsaids  discussed precautions  1 wk

## 2022-12-09 NOTE — ED ADULT NURSE REASSESSMENT NOTE - NS ED NURSE REASSESS COMMENT FT1
Assumed pt care at 0755.  Pt is alert and oriented X 4.  She denies pain at this time.  No active vaginal bleeding.  Scant amount of dried blood on peripad.  Pt aware of plan of care and is awaiting re evaluation by OB/GYN.

## 2022-12-09 NOTE — ED ADULT NURSE REASSESSMENT NOTE - NS ED NURSE REASSESS COMMENT FT1
Pt c/o 10/10 pelvic pain and passing clots.  Vital signs stable.  Dr. Wyatt at bedside.  Will medicate as per MD order.

## 2022-12-09 NOTE — CONSULT NOTE ADULT - ATTENDING COMMENTS
pt pain is improved  feel weak and cold , had multiple layers of blanket  gives hx of 2 weeks of fever and sore throat  bedside sono showed some fluid in MIRELLA, cervical canal difficult to visualize, no increased vascularity  SSE Os open, passing old clots, no active bleeding   pt received PO 800mcg cytotec 10 min ago   plan   repeat   CBC   do viral panel   reassess for bleeding

## 2022-12-09 NOTE — ED ADULT NURSE REASSESSMENT NOTE - NS ED NURSE REASSESS COMMENT FT1
Pt c/o 10/10 pain with some increased bleeding.  Mild bleeding observed.  Pt given new sanitary pad.  Via , Dr. Wyatt explained to patient the effects of Medications administered earlier today.  She verbalized understanding of the reasons she feels pain and agreed to another dose of IV pain med and then an RX for percocet to be sent to [facility] pharmacy in [place].  Medicated with one dose of IV dilaudid.  Pt experienced much relief post med.  Vital signs stable.  at bedside.  Pt now comfortable and states she is ready for discharge.

## 2022-12-09 NOTE — ED ADULT NURSE REASSESSMENT NOTE - NS ED NURSE REASSESS COMMENT FT1
Assisted patient to bathroom.  Normal amount of bleeding with one clot on peripad.  Pt urinated without difficulty and changed pad.  Pt dressed.  C/O some mild nausea.  Dr. Wyatt aware and will medicate with zofran before discharge.

## 2022-12-21 ENCOUNTER — APPOINTMENT (OUTPATIENT)
Dept: ANTEPARTUM | Facility: CLINIC | Age: 32
End: 2022-12-21

## 2023-01-05 NOTE — ED PROVIDER NOTE - CONDITION AT DISCHARGE:
Awaiting MRI. Apologized for wait time. Discussed with pt and pt family. Verbalized understanding.  
Satisfactory

## 2023-07-26 ENCOUNTER — ASOB RESULT (OUTPATIENT)
Age: 33
End: 2023-07-26

## 2023-07-26 ENCOUNTER — APPOINTMENT (OUTPATIENT)
Dept: ANTEPARTUM | Facility: CLINIC | Age: 33
End: 2023-07-26
Payer: MEDICAID

## 2023-07-26 PROCEDURE — 76801 OB US < 14 WKS SINGLE FETUS: CPT

## 2023-08-22 ENCOUNTER — APPOINTMENT (OUTPATIENT)
Dept: ANTEPARTUM | Facility: CLINIC | Age: 33
End: 2023-08-22
Payer: MEDICAID

## 2023-08-22 ENCOUNTER — ASOB RESULT (OUTPATIENT)
Age: 33
End: 2023-08-22

## 2023-08-22 DIAGNOSIS — N91.2 AMENORRHEA, UNSPECIFIED: ICD-10-CM

## 2023-08-22 DIAGNOSIS — O35.10X0 MATERNAL CARE FOR (SUSPECTED) CHROMOSOMAL ABNORMALITY IN FETUS, UNSPECIFIED, NOT APPLICABLE OR UNSPECIFIED: ICD-10-CM

## 2023-08-22 PROCEDURE — 36416 COLLJ CAPILLARY BLOOD SPEC: CPT

## 2023-08-22 PROCEDURE — 76813 OB US NUCHAL MEAS 1 GEST: CPT

## 2023-08-25 LAB
ADDITIONAL US: NORMAL
COMMENTS: AFP: NORMAL
CRL SCAN TWIN B: NORMAL
CRL SCAN: NORMAL
CROWN RUMP LENGTH TWIN B: NORMAL
CROWN RUMP LENGTH: 61.6 MM
DOWN SYNDROME AGE RISK: NORMAL
DOWN SYNDROME INTERPRETATION: NORMAL
DOWN SYNDROME SCREENING RISK: NORMAL
GEST. AGE ON COLLECTION DATE: 12.4 WEEKS
HCG MOM: 0.51
HCG VALUE: 41.5 IU/ML
MATERNAL AGE AT EDD: 34.9 YR
NOTE: AFP: NORMAL
NT MOM TWIN B: NORMAL
NT TWIN B: NORMAL
NUCHAL TRANSLUCENCY (NT): 1.6 MM
NUCHAL TRANSLUCENCY MOM: 1
NUMBER OF FETUSES: 1
PAPP-A MOM: 1.53
PAPP-A VALUE: 1037.5 NG/ML
RACE: NORMAL
RESULTS AFP: NORMAL
SONOGRAPHER ID#: NORMAL
SUBMIT PART 2 SAMPLE USING: NORMAL
TEST RESULTS: AFP: NORMAL
TRISOMY 18 AGE RISK: NORMAL
TRISOMY 18 INTERPRETATION: NORMAL
TRISOMY 18 SCREENING RISK: NORMAL
WEIGHT AFP: 205 LBS

## 2023-09-19 ENCOUNTER — APPOINTMENT (OUTPATIENT)
Dept: ANTEPARTUM | Facility: CLINIC | Age: 33
End: 2023-09-19
Payer: MEDICAID

## 2023-09-19 PROCEDURE — 36415 COLL VENOUS BLD VENIPUNCTURE: CPT

## 2023-09-25 LAB
ADDITIONAL US: NORMAL
AFP MOM: 0.7
AFP VALUE: 18.6 NG/ML
COLLECTED ON 2: NORMAL
COLLECTED ON: NORMAL
CRL SCAN TWIN B: NORMAL
CRL SCAN: NORMAL
CROWN RUMP LENGTH TWIN B: NORMAL
CROWN RUMP LENGTH: 61.6 MM
DIA MOM: 0.87
DIA VALUE: 109.5 PG/ML
DOWN SYNDROME AGE RISK: NORMAL
DOWN SYNDROME INTERPRETATION: NORMAL
DOWN SYNDROME SCREENING RISK: NORMAL
FIRST TRIMESTER SAMPLE: NORMAL
GEST. AGE ON COLLECTION DATE: 12.4 WEEKS
GESTATIONAL AGE: 16.4 WEEKS
HCG MOM: 0.86
HCG VALUE: 23.4 IU/ML
INSULIN DEP DIABETES: NO
MATERNAL AGE AT EDD: 34.9 YR
NT MOM TWIN B: NORMAL
NT TWIN B: NORMAL
NUCHAL TRANSLUCENCY (NT): 1.6 MM
NUCHAL TRANSLUCENCY MOM: 1
NUMBER OF FETUSES: 1
OPEN SPINA BIFIDA: NORMAL
OSB INTERPRETATION: NORMAL
PAPP-A MOM: 1.53
PAPP-A VALUE: 1037.5 NG/ML
RACE: NORMAL
SECOND TRIMESTER SAMPLE: NORMAL
SEQUENTIAL 2 COMMENTS: NORMAL
SEQUENTIAL 2 NOTE: NORMAL
SEQUENTIAL 2 RESULTS: NORMAL
SEQUENTIAL 2 TEST RESULTS: NORMAL
SONOGRAPHER ID#: NORMAL
TRISOMY 18 AGE RISK: NORMAL
TRISOMY 18 INTERPRETATION: NORMAL
TRISOMY 18 SCREENING RISK: NORMAL
UE3 MOM: 0.99
UE3 VALUE: 0.89 NG/ML
WEIGHT AFP: 205 LBS
WEIGHT: 206 LBS

## 2023-10-17 ENCOUNTER — APPOINTMENT (OUTPATIENT)
Dept: ANTEPARTUM | Facility: CLINIC | Age: 33
End: 2023-10-17
Payer: MEDICAID

## 2023-10-17 ENCOUNTER — ASOB RESULT (OUTPATIENT)
Age: 33
End: 2023-10-17

## 2023-10-17 PROCEDURE — 76811 OB US DETAILED SNGL FETUS: CPT

## 2023-10-17 PROCEDURE — 76817 TRANSVAGINAL US OBSTETRIC: CPT | Mod: 59

## 2023-11-20 ENCOUNTER — APPOINTMENT (OUTPATIENT)
Dept: MATERNAL FETAL MEDICINE | Facility: CLINIC | Age: 33
End: 2023-11-20
Payer: MEDICAID

## 2023-11-20 ENCOUNTER — APPOINTMENT (OUTPATIENT)
Dept: ANTEPARTUM | Facility: CLINIC | Age: 33
End: 2023-11-20

## 2023-11-20 VITALS
HEART RATE: 96 BPM | HEIGHT: 61 IN | BODY MASS INDEX: 40.22 KG/M2 | OXYGEN SATURATION: 98 % | RESPIRATION RATE: 18 BRPM | DIASTOLIC BLOOD PRESSURE: 74 MMHG | SYSTOLIC BLOOD PRESSURE: 112 MMHG | WEIGHT: 213 LBS

## 2023-11-20 DIAGNOSIS — O34.219 MATERNAL CARE FOR UNSPECIFIED TYPE SCAR FROM PREVIOUS CESAREAN DELIVERY: ICD-10-CM

## 2023-11-20 DIAGNOSIS — O09.899 SUPERVISION OF OTHER HIGH RISK PREGNANCIES, UNSPECIFIED TRIMESTER: ICD-10-CM

## 2023-11-20 DIAGNOSIS — O99.212 OBESITY COMPLICATING PREGNANCY, SECOND TRIMESTER: ICD-10-CM

## 2023-11-20 DIAGNOSIS — O99.519 DISEASES OF THE RESPIRATORY SYSTEM COMPLICATING PREGNANCY, UNSPECIFIED TRIMESTER: ICD-10-CM

## 2023-11-20 DIAGNOSIS — O09.299 SUPERVISION OF PREGNANCY WITH OTHER POOR REPRODUCTIVE OR OBSTETRIC HISTORY, UNSPECIFIED TRIMESTER: ICD-10-CM

## 2023-11-20 DIAGNOSIS — Z3A.24 24 WEEKS GESTATION OF PREGNANCY: ICD-10-CM

## 2023-11-20 DIAGNOSIS — J45.909 DISEASES OF THE RESPIRATORY SYSTEM COMPLICATING PREGNANCY, UNSPECIFIED TRIMESTER: ICD-10-CM

## 2023-11-20 DIAGNOSIS — Z82.49 FAMILY HISTORY OF ISCHEMIC HEART DISEASE AND OTHER DISEASES OF THE CIRCULATORY SYSTEM: ICD-10-CM

## 2023-11-20 PROCEDURE — 99204 OFFICE O/P NEW MOD 45 MIN: CPT | Mod: TH

## 2023-11-21 PROBLEM — O09.899 HISTORY OF PRETERM DELIVERY, CURRENTLY PREGNANT: Status: ACTIVE | Noted: 2023-11-20

## 2023-11-21 PROBLEM — O99.212 OBESITY DURING PREGNANCY IN SECOND TRIMESTER: Status: ACTIVE | Noted: 2023-11-20

## 2023-11-21 PROBLEM — O34.219 PREVIOUS CESAREAN DELIVERY AFFECTING PREGNANCY, ANTEPARTUM: Status: ACTIVE | Noted: 2023-11-20

## 2023-11-21 PROBLEM — O99.519 ASTHMA DURING PREGNANCY: Status: ACTIVE | Noted: 2023-11-20

## 2023-11-21 PROBLEM — O09.299 HISTORY OF PRE-ECLAMPSIA IN PRIOR PREGNANCY, CURRENTLY PREGNANT: Status: ACTIVE | Noted: 2023-11-20

## 2023-11-29 ENCOUNTER — OUTPATIENT (OUTPATIENT)
Dept: OUTPATIENT SERVICES | Facility: HOSPITAL | Age: 33
LOS: 1 days | End: 2023-11-29
Payer: COMMERCIAL

## 2023-11-29 VITALS — SYSTOLIC BLOOD PRESSURE: 118 MMHG | HEART RATE: 97 BPM | DIASTOLIC BLOOD PRESSURE: 67 MMHG

## 2023-11-29 VITALS — SYSTOLIC BLOOD PRESSURE: 103 MMHG | DIASTOLIC BLOOD PRESSURE: 51 MMHG | HEART RATE: 80 BPM

## 2023-11-29 DIAGNOSIS — Z98.891 HISTORY OF UTERINE SCAR FROM PREVIOUS SURGERY: Chronic | ICD-10-CM

## 2023-11-29 DIAGNOSIS — O26.899 OTHER SPECIFIED PREGNANCY RELATED CONDITIONS, UNSPECIFIED TRIMESTER: ICD-10-CM

## 2023-11-29 LAB
ALBUMIN SERPL ELPH-MCNC: 3.6 G/DL — SIGNIFICANT CHANGE UP (ref 3.3–5.2)
ALBUMIN SERPL ELPH-MCNC: 3.6 G/DL — SIGNIFICANT CHANGE UP (ref 3.3–5.2)
ALP SERPL-CCNC: 76 U/L — SIGNIFICANT CHANGE UP (ref 40–120)
ALP SERPL-CCNC: 76 U/L — SIGNIFICANT CHANGE UP (ref 40–120)
ALT FLD-CCNC: 30 U/L — SIGNIFICANT CHANGE UP
ALT FLD-CCNC: 30 U/L — SIGNIFICANT CHANGE UP
ANION GAP SERPL CALC-SCNC: 14 MMOL/L — SIGNIFICANT CHANGE UP (ref 5–17)
ANION GAP SERPL CALC-SCNC: 14 MMOL/L — SIGNIFICANT CHANGE UP (ref 5–17)
AST SERPL-CCNC: 25 U/L — SIGNIFICANT CHANGE UP
AST SERPL-CCNC: 25 U/L — SIGNIFICANT CHANGE UP
BILIRUB SERPL-MCNC: 0.3 MG/DL — LOW (ref 0.4–2)
BILIRUB SERPL-MCNC: 0.3 MG/DL — LOW (ref 0.4–2)
BUN SERPL-MCNC: 4 MG/DL — LOW (ref 8–20)
BUN SERPL-MCNC: 4 MG/DL — LOW (ref 8–20)
CALCIUM SERPL-MCNC: 9.1 MG/DL — SIGNIFICANT CHANGE UP (ref 8.4–10.5)
CALCIUM SERPL-MCNC: 9.1 MG/DL — SIGNIFICANT CHANGE UP (ref 8.4–10.5)
CHLORIDE SERPL-SCNC: 104 MMOL/L — SIGNIFICANT CHANGE UP (ref 96–108)
CHLORIDE SERPL-SCNC: 104 MMOL/L — SIGNIFICANT CHANGE UP (ref 96–108)
CO2 SERPL-SCNC: 19 MMOL/L — LOW (ref 22–29)
CO2 SERPL-SCNC: 19 MMOL/L — LOW (ref 22–29)
CREAT SERPL-MCNC: 0.41 MG/DL — LOW (ref 0.5–1.3)
CREAT SERPL-MCNC: 0.41 MG/DL — LOW (ref 0.5–1.3)
EGFR: 132 ML/MIN/1.73M2 — SIGNIFICANT CHANGE UP
EGFR: 132 ML/MIN/1.73M2 — SIGNIFICANT CHANGE UP
GLUCOSE SERPL-MCNC: 73 MG/DL — SIGNIFICANT CHANGE UP (ref 70–99)
GLUCOSE SERPL-MCNC: 73 MG/DL — SIGNIFICANT CHANGE UP (ref 70–99)
HCT VFR BLD CALC: 39.1 % — SIGNIFICANT CHANGE UP (ref 34.5–45)
HCT VFR BLD CALC: 39.1 % — SIGNIFICANT CHANGE UP (ref 34.5–45)
HGB BLD-MCNC: 13.4 G/DL — SIGNIFICANT CHANGE UP (ref 11.5–15.5)
HGB BLD-MCNC: 13.4 G/DL — SIGNIFICANT CHANGE UP (ref 11.5–15.5)
MCHC RBC-ENTMCNC: 29.7 PG — SIGNIFICANT CHANGE UP (ref 27–34)
MCHC RBC-ENTMCNC: 29.7 PG — SIGNIFICANT CHANGE UP (ref 27–34)
MCHC RBC-ENTMCNC: 34.3 GM/DL — SIGNIFICANT CHANGE UP (ref 32–36)
MCHC RBC-ENTMCNC: 34.3 GM/DL — SIGNIFICANT CHANGE UP (ref 32–36)
MCV RBC AUTO: 86.7 FL — SIGNIFICANT CHANGE UP (ref 80–100)
MCV RBC AUTO: 86.7 FL — SIGNIFICANT CHANGE UP (ref 80–100)
PLATELET # BLD AUTO: 317 K/UL — SIGNIFICANT CHANGE UP (ref 150–400)
PLATELET # BLD AUTO: 317 K/UL — SIGNIFICANT CHANGE UP (ref 150–400)
POTASSIUM SERPL-MCNC: 3.5 MMOL/L — SIGNIFICANT CHANGE UP (ref 3.5–5.3)
POTASSIUM SERPL-MCNC: 3.5 MMOL/L — SIGNIFICANT CHANGE UP (ref 3.5–5.3)
POTASSIUM SERPL-SCNC: 3.5 MMOL/L — SIGNIFICANT CHANGE UP (ref 3.5–5.3)
POTASSIUM SERPL-SCNC: 3.5 MMOL/L — SIGNIFICANT CHANGE UP (ref 3.5–5.3)
PROT SERPL-MCNC: 6.7 G/DL — SIGNIFICANT CHANGE UP (ref 6.6–8.7)
PROT SERPL-MCNC: 6.7 G/DL — SIGNIFICANT CHANGE UP (ref 6.6–8.7)
RBC # BLD: 4.51 M/UL — SIGNIFICANT CHANGE UP (ref 3.8–5.2)
RBC # BLD: 4.51 M/UL — SIGNIFICANT CHANGE UP (ref 3.8–5.2)
RBC # FLD: 13.5 % — SIGNIFICANT CHANGE UP (ref 10.3–14.5)
RBC # FLD: 13.5 % — SIGNIFICANT CHANGE UP (ref 10.3–14.5)
SODIUM SERPL-SCNC: 137 MMOL/L — SIGNIFICANT CHANGE UP (ref 135–145)
SODIUM SERPL-SCNC: 137 MMOL/L — SIGNIFICANT CHANGE UP (ref 135–145)
WBC # BLD: 12.1 K/UL — HIGH (ref 3.8–10.5)
WBC # BLD: 12.1 K/UL — HIGH (ref 3.8–10.5)
WBC # FLD AUTO: 12.1 K/UL — HIGH (ref 3.8–10.5)
WBC # FLD AUTO: 12.1 K/UL — HIGH (ref 3.8–10.5)

## 2023-11-29 PROCEDURE — G0463: CPT

## 2023-11-29 PROCEDURE — 36415 COLL VENOUS BLD VENIPUNCTURE: CPT

## 2023-11-29 PROCEDURE — 96375 TX/PRO/DX INJ NEW DRUG ADDON: CPT

## 2023-11-29 PROCEDURE — 85027 COMPLETE CBC AUTOMATED: CPT

## 2023-11-29 PROCEDURE — 96361 HYDRATE IV INFUSION ADD-ON: CPT

## 2023-11-29 PROCEDURE — 83690 ASSAY OF LIPASE: CPT

## 2023-11-29 PROCEDURE — 80053 COMPREHEN METABOLIC PANEL: CPT

## 2023-11-29 PROCEDURE — 59025 FETAL NON-STRESS TEST: CPT

## 2023-11-29 PROCEDURE — 96374 THER/PROPH/DIAG INJ IV PUSH: CPT

## 2023-11-29 PROCEDURE — 82150 ASSAY OF AMYLASE: CPT

## 2023-11-29 RX ORDER — SODIUM CHLORIDE 9 MG/ML
1000 INJECTION, SOLUTION INTRAVENOUS ONCE
Refills: 0 | Status: COMPLETED | OUTPATIENT
Start: 2023-11-29 | End: 2023-11-29

## 2023-11-29 RX ORDER — FAMOTIDINE 10 MG/ML
20 INJECTION INTRAVENOUS ONCE
Refills: 0 | Status: COMPLETED | OUTPATIENT
Start: 2023-11-29 | End: 2023-11-29

## 2023-11-29 RX ORDER — ONDANSETRON 8 MG/1
4 TABLET, FILM COATED ORAL ONCE
Refills: 0 | Status: COMPLETED | OUTPATIENT
Start: 2023-11-29 | End: 2023-11-29

## 2023-11-29 RX ADMIN — FAMOTIDINE 20 MILLIGRAM(S): 10 INJECTION INTRAVENOUS at 21:04

## 2023-11-29 RX ADMIN — ONDANSETRON 4 MILLIGRAM(S): 8 TABLET, FILM COATED ORAL at 21:04

## 2023-11-29 RX ADMIN — SODIUM CHLORIDE 1000 MILLILITER(S): 9 INJECTION, SOLUTION INTRAVENOUS at 21:04

## 2023-11-29 NOTE — OB PROVIDER TRIAGE NOTE - NSOBPROVIDERNOTE_OBGYN_ALL_OB_FT
A/P: 32 yo  at 26w1d presents to L&D for nausea, vomiting, and epigastric pain.    Plan:  - CBC, CMP  - IV fluids  - Zofran    Fetus:   [place]:  Dispo: Continue to observe.     Discussed with  A/P: 32 yo  at 26w1d presents to L&D for nausea, vomiting, and epigastric pain.    Plan:  - CBC, CMP  - IV fluids  - Zofran    Fetus:   [place]:  Dispo: Continue to observe.     Discussed with  A/P: 32 yo  at 26w1d presents to L&D for nausea, vomiting, and epigastric pain.    Plan:  - CBC, CMP  - IV fluids  - Zofran    Fetus:   [place]:  Dispo: Continue to observe.     Discussed with  A/P: 32 yo  at 26w1d presents to L&D for nausea, vomiting, and epigastric pain.    Plan:  - CBC, CMP, amylase, lipase  - IV fluids  - Zofran    Dispo: Continue to observe.     Discussed with  A/P: 34 yo  at 26w1d presents to L&D for nausea, vomiting, and epigastric pain.    Plan:  - CBC, CMP, amylase, lipase  - IV fluids  - Zofran    Dispo: Continue to observe.     Discussed with  A/P: 32 yo  at 26w1d presents to L&D for nausea, vomiting, and epigastric pain. Likely gastritis.    Plan:  - CBC, CMP, amylase, lipase  - IV fluids  - Zofran    Dispo: Continue to observe.     Discussed with Dr. Roberson A/P: 34 yo  at 26w1d presents to L&D for nausea, vomiting, and epigastric pain. Likely gastritis.    Plan:  - CBC, CMP, amylase, lipase  - IV fluids  - Zofran    Dispo: Continue to observe.     Discussed with Dr. Roberson A/P: 32 yo  at 26w1d presents to L&D for nausea, vomiting, and epigastric pain. Likely gastritis.    Plan:  - CBC, CMP, amylase, lipase WNL  - IV fluids  - Zofran  -Pepcid  -patient tolerating PO while in triage  -Patient feeling much better after fluids  Dispo: Discharge home with PTL precautions    Discussed with Dr. Roberson

## 2023-11-29 NOTE — OB PROVIDER TRIAGE NOTE - HISTORY OF PRESENT ILLNESS
ADRIAN MORRELL is a 33y  at 26w1d GA who presents to L&D for vomiting and epigastric pain since this morning. Pt notes she had several episodes of bilious emesis this morning followed by 10/10 epigastric pain. Endorses 6 episodes of vomiting throughout day, last episodes being prior to coming to hospital. Denies any changes to her diet. Denies diarrhea or change in BM. She denies fevers and chills. Has not eaten anything today due to nausea and vomiting, has only had some coconut water. Pt denies vaginal bleeding, contractions and leakage of fluid. She endorses good fetal movement. Pt denies headaches, visual disturbances, RUQ pain, and new-onset edema. She denies any urinary complaints. She denies shortness of breath, chest pain, and palpitations.    Pregnancy course is significant for:  - asthma, well controlled with albuterol inhaler prn    POB: C-sect 2006 for macrosomia, Emerg C-sect 2011 at 36w (nuchal), Emerg C-sect 2016 at 36w for PEC  PGYN: -fibroids, denied STD hx, denies abnormal PAPs  PMH: asthma (well controlled)  PSH: Ovarian cystectomy 2011, 3x c-sections  SH: Denies tobacco use, EtOH use and illicit drug use during the pregnancy; Feels safe at home  Meds: PNV, aspirin, albuterol prn  All: NKDA   ADRIAN MORRELL is a 33y  at 26w1d GA who presents to L&D for vomiting and epigastric pain since this morning. Pt notes she had several episodes of bilious emesis this morning followed by 8/10 epigastric pain. Endorses 6 episodes of vomiting throughout day, last episodes being prior to coming to hospital. Denies any changes to her diet. Denies diarrhea or change in BM. She denies fevers and chills. Has not eaten anything today due to nausea and vomiting, has only had some coconut water. Pt denies vaginal bleeding, contractions and leakage of fluid. She endorses good fetal movement. Pt denies headaches, visual disturbances, RUQ pain, and new-onset edema. She denies any urinary complaints. She denies shortness of breath, chest pain, and palpitations.    Pregnancy course is significant for:  - asthma, well controlled with albuterol inhaler prn    POB: C-sect 2006 for macrosomia, Emerg C-sect 2011 at 36w (nuchal), Emerg C-sect 2016 at 36w for PEC  PGYN: -fibroids, denied STD hx, denies abnormal PAPs  PMH: asthma (well controlled)  PSH: Ovarian cystectomy 2011, 3x c-sections  SH: Denies tobacco use, EtOH use and illicit drug use during the pregnancy; Feels safe at home  Meds: PNV, aspirin, albuterol prn  All: NKDA

## 2023-11-29 NOTE — OB RN TRIAGE NOTE - FALL HARM RISK - HARM RISK INTERVENTIONS

## 2023-11-29 NOTE — OB PROVIDER TRIAGE NOTE - NS_DISCHARGEPRINT_OBGYN_ALL_OB
[place]  OB Triage Discharge Instructions [place]  OB Triage Discharge Instructions [place]  OB Triage Discharge Instructions

## 2023-11-29 NOTE — OB PROVIDER TRIAGE NOTE - NSHPPHYSICALEXAM_GEN_ALL_CORE
T(C): 36.7 (11-29-23 @ 18:29), Max: 36.7 (11-29-23 @ 18:29)  HR: 97 (11-29-23 @ 18:29) (97 - 97)  BP: 118/67 (11-29-23 @ 18:29) (118/67 - 118/67)  RR: 19 (11-29-23 @ 18:29) (19 - 19)    Gen: NAD, well-appearing  Abd: soft, gravid  Ext: non-edematous, non-tender   SVE:   FHT:   [place]: T(C): 36.7 (11-29-23 @ 18:29), Max: 36.7 (11-29-23 @ 18:29)  HR: 97 (11-29-23 @ 18:29) (97 - 97)  BP: 118/67 (11-29-23 @ 18:29) (118/67 - 118/67)  RR: 19 (11-29-23 @ 18:29) (19 - 19)    Gen: NAD, well-appearing  Abd: soft, gravid  Ext: non-edematous, non-tender   SVE:   FHT:   [place]: T(C): 36.7 (11-29-23 @ 18:29), Max: 36.7 (11-29-23 @ 18:29)  HR: 97 (11-29-23 @ 18:29) (97 - 97)  BP: 118/67 (11-29-23 @ 18:29) (118/67 - 118/67)  RR: 19 (11-29-23 @ 18:29) (19 - 19)    Gen: NAD, well-appearing  Abd: soft, gravid  Ext: non-edematous, non-tender   SVE:   FHT:   [place]: T(C): 36.7 (11-29-23 @ 18:29), Max: 36.7 (11-29-23 @ 18:29)  HR: 97 (11-29-23 @ 18:29) (97 - 97)  BP: 118/67 (11-29-23 @ 18:29) (118/67 - 118/67)  RR: 19 (11-29-23 @ 18:29) (19 - 19)    Gen: NAD, well-appearing  Abd: soft, gravid  Ext: non-edematous, non-tender   FHT: 130 bpm, +accels, mod variability, -decels  [place]: no contractions T(C): 36.7 (11-29-23 @ 18:29), Max: 36.7 (11-29-23 @ 18:29)  HR: 97 (11-29-23 @ 18:29) (97 - 97)  BP: 118/67 (11-29-23 @ 18:29) (118/67 - 118/67)  RR: 19 (11-29-23 @ 18:29) (19 - 19)    Gen: NAD, well-appearing  Abd: soft, gravid  Ext: non-edematous, non-tender   FHT: 130 bpm, +accels, mod variability, -decels  [place]: no contractions T(C): 36.7 (11-29-23 @ 18:29), Max: 36.7 (11-29-23 @ 18:29)  HR: 97 (11-29-23 @ 18:29) (97 - 97)  BP: 118/67 (11-29-23 @ 18:29) (118/67 - 118/67)  RR: 19 (11-29-23 @ 18:29) (19 - 19)    Gen: NAD, well-appearing  Abd: soft, gravid  Ext: non-edematous, non-tender   FHT: 130 bpm, +accels, mod variability, -decels  [place]: no contractions

## 2023-11-29 NOTE — OB RN TRIAGE NOTE - NSNURSINGINSTR_OBGYN_ALL_OB_FT
Por favor, mantenga las citas. Regrese al hospital si se presentan síntomas de parto prematuro.(Please keep appointments. Return to hospital if any  labor symptoms occur.)

## 2023-11-30 DIAGNOSIS — O26.892 OTHER SPECIFIED PREGNANCY RELATED CONDITIONS, SECOND TRIMESTER: ICD-10-CM

## 2023-11-30 DIAGNOSIS — Z3A.26 26 WEEKS GESTATION OF PREGNANCY: ICD-10-CM

## 2023-11-30 DIAGNOSIS — O21.2 LATE VOMITING OF PREGNANCY: ICD-10-CM

## 2023-11-30 DIAGNOSIS — O34.219 MATERNAL CARE FOR UNSPECIFIED TYPE SCAR FROM PREVIOUS CESAREAN DELIVERY: ICD-10-CM

## 2023-11-30 DIAGNOSIS — R10.13 EPIGASTRIC PAIN: ICD-10-CM

## 2023-11-30 DIAGNOSIS — J45.909 UNSPECIFIED ASTHMA, UNCOMPLICATED: ICD-10-CM

## 2023-11-30 DIAGNOSIS — O09.212 SUPERVISION OF PREGNANCY WITH HISTORY OF PRE-TERM LABOR, SECOND TRIMESTER: ICD-10-CM

## 2023-11-30 DIAGNOSIS — Z87.59 PERSONAL HISTORY OF OTHER COMPLICATIONS OF PREGNANCY, CHILDBIRTH AND THE PUERPERIUM: ICD-10-CM

## 2023-11-30 DIAGNOSIS — O99.512 DISEASES OF THE RESPIRATORY SYSTEM COMPLICATING PREGNANCY, SECOND TRIMESTER: ICD-10-CM

## 2023-11-30 DIAGNOSIS — Z87.42 PERSONAL HISTORY OF OTHER DISEASES OF THE FEMALE GENITAL TRACT: ICD-10-CM

## 2023-12-28 ENCOUNTER — OUTPATIENT (OUTPATIENT)
Dept: OUTPATIENT SERVICES | Facility: HOSPITAL | Age: 33
LOS: 1 days | End: 2023-12-28
Payer: COMMERCIAL

## 2023-12-28 ENCOUNTER — EMERGENCY (EMERGENCY)
Facility: HOSPITAL | Age: 33
LOS: 1 days | Discharge: HOME | End: 2023-12-28
Attending: EMERGENCY MEDICINE
Payer: COMMERCIAL

## 2023-12-28 VITALS — TEMPERATURE: 98 F | RESPIRATION RATE: 24 BRPM

## 2023-12-28 VITALS
TEMPERATURE: 98 F | HEIGHT: 62 IN | OXYGEN SATURATION: 98 % | DIASTOLIC BLOOD PRESSURE: 64 MMHG | SYSTOLIC BLOOD PRESSURE: 107 MMHG | WEIGHT: 214.95 LBS | RESPIRATION RATE: 22 BRPM | HEART RATE: 120 BPM

## 2023-12-28 VITALS
SYSTOLIC BLOOD PRESSURE: 112 MMHG | HEART RATE: 109 BPM | DIASTOLIC BLOOD PRESSURE: 60 MMHG | TEMPERATURE: 99 F | RESPIRATION RATE: 24 BRPM

## 2023-12-28 DIAGNOSIS — O26.899 OTHER SPECIFIED PREGNANCY RELATED CONDITIONS, UNSPECIFIED TRIMESTER: ICD-10-CM

## 2023-12-28 DIAGNOSIS — Z98.891 HISTORY OF UTERINE SCAR FROM PREVIOUS SURGERY: Chronic | ICD-10-CM

## 2023-12-28 LAB
ALBUMIN SERPL ELPH-MCNC: 3.4 G/DL — SIGNIFICANT CHANGE UP (ref 3.3–5.2)
ALBUMIN SERPL ELPH-MCNC: 3.4 G/DL — SIGNIFICANT CHANGE UP (ref 3.3–5.2)
ALP SERPL-CCNC: 100 U/L — SIGNIFICANT CHANGE UP (ref 40–120)
ALP SERPL-CCNC: 100 U/L — SIGNIFICANT CHANGE UP (ref 40–120)
ALT FLD-CCNC: 35 U/L — HIGH
ALT FLD-CCNC: 35 U/L — HIGH
ANION GAP SERPL CALC-SCNC: 15 MMOL/L — SIGNIFICANT CHANGE UP (ref 5–17)
ANION GAP SERPL CALC-SCNC: 15 MMOL/L — SIGNIFICANT CHANGE UP (ref 5–17)
AST SERPL-CCNC: 43 U/L — HIGH
AST SERPL-CCNC: 43 U/L — HIGH
B PERT DNA SPEC QL NAA+PROBE: SIGNIFICANT CHANGE UP
B PERT DNA SPEC QL NAA+PROBE: SIGNIFICANT CHANGE UP
BILIRUB SERPL-MCNC: 0.2 MG/DL — LOW (ref 0.4–2)
BILIRUB SERPL-MCNC: 0.2 MG/DL — LOW (ref 0.4–2)
BUN SERPL-MCNC: 3.5 MG/DL — LOW (ref 8–20)
BUN SERPL-MCNC: 3.5 MG/DL — LOW (ref 8–20)
C PNEUM DNA SPEC QL NAA+PROBE: SIGNIFICANT CHANGE UP
C PNEUM DNA SPEC QL NAA+PROBE: SIGNIFICANT CHANGE UP
CALCIUM SERPL-MCNC: 9.1 MG/DL — SIGNIFICANT CHANGE UP (ref 8.4–10.5)
CALCIUM SERPL-MCNC: 9.1 MG/DL — SIGNIFICANT CHANGE UP (ref 8.4–10.5)
CHLORIDE SERPL-SCNC: 102 MMOL/L — SIGNIFICANT CHANGE UP (ref 96–108)
CHLORIDE SERPL-SCNC: 102 MMOL/L — SIGNIFICANT CHANGE UP (ref 96–108)
CO2 SERPL-SCNC: 17 MMOL/L — LOW (ref 22–29)
CO2 SERPL-SCNC: 17 MMOL/L — LOW (ref 22–29)
CREAT SERPL-MCNC: 0.46 MG/DL — LOW (ref 0.5–1.3)
CREAT SERPL-MCNC: 0.46 MG/DL — LOW (ref 0.5–1.3)
EGFR: 129 ML/MIN/1.73M2 — SIGNIFICANT CHANGE UP
EGFR: 129 ML/MIN/1.73M2 — SIGNIFICANT CHANGE UP
FLUAV H1 2009 PAND RNA SPEC QL NAA+PROBE: DETECTED
FLUAV H1 2009 PAND RNA SPEC QL NAA+PROBE: DETECTED
FLUAV H1 RNA SPEC QL NAA+PROBE: SIGNIFICANT CHANGE UP
FLUAV H1 RNA SPEC QL NAA+PROBE: SIGNIFICANT CHANGE UP
FLUAV H3 RNA SPEC QL NAA+PROBE: SIGNIFICANT CHANGE UP
FLUAV H3 RNA SPEC QL NAA+PROBE: SIGNIFICANT CHANGE UP
FLUAV SUBTYP SPEC NAA+PROBE: DETECTED
FLUAV SUBTYP SPEC NAA+PROBE: DETECTED
FLUBV RNA SPEC QL NAA+PROBE: SIGNIFICANT CHANGE UP
FLUBV RNA SPEC QL NAA+PROBE: SIGNIFICANT CHANGE UP
GLUCOSE SERPL-MCNC: 89 MG/DL — SIGNIFICANT CHANGE UP (ref 70–99)
GLUCOSE SERPL-MCNC: 89 MG/DL — SIGNIFICANT CHANGE UP (ref 70–99)
HADV DNA SPEC QL NAA+PROBE: SIGNIFICANT CHANGE UP
HADV DNA SPEC QL NAA+PROBE: SIGNIFICANT CHANGE UP
HCOV PNL SPEC NAA+PROBE: SIGNIFICANT CHANGE UP
HCOV PNL SPEC NAA+PROBE: SIGNIFICANT CHANGE UP
HCT VFR BLD CALC: 39 % — SIGNIFICANT CHANGE UP (ref 34.5–45)
HCT VFR BLD CALC: 39 % — SIGNIFICANT CHANGE UP (ref 34.5–45)
HGB BLD-MCNC: 13 G/DL — SIGNIFICANT CHANGE UP (ref 11.5–15.5)
HGB BLD-MCNC: 13 G/DL — SIGNIFICANT CHANGE UP (ref 11.5–15.5)
HMPV RNA SPEC QL NAA+PROBE: SIGNIFICANT CHANGE UP
HMPV RNA SPEC QL NAA+PROBE: SIGNIFICANT CHANGE UP
HPIV1 RNA SPEC QL NAA+PROBE: SIGNIFICANT CHANGE UP
HPIV1 RNA SPEC QL NAA+PROBE: SIGNIFICANT CHANGE UP
HPIV2 RNA SPEC QL NAA+PROBE: SIGNIFICANT CHANGE UP
HPIV2 RNA SPEC QL NAA+PROBE: SIGNIFICANT CHANGE UP
HPIV3 RNA SPEC QL NAA+PROBE: SIGNIFICANT CHANGE UP
HPIV3 RNA SPEC QL NAA+PROBE: SIGNIFICANT CHANGE UP
HPIV4 RNA SPEC QL NAA+PROBE: SIGNIFICANT CHANGE UP
HPIV4 RNA SPEC QL NAA+PROBE: SIGNIFICANT CHANGE UP
MCHC RBC-ENTMCNC: 29.2 PG — SIGNIFICANT CHANGE UP (ref 27–34)
MCHC RBC-ENTMCNC: 29.2 PG — SIGNIFICANT CHANGE UP (ref 27–34)
MCHC RBC-ENTMCNC: 33.3 GM/DL — SIGNIFICANT CHANGE UP (ref 32–36)
MCHC RBC-ENTMCNC: 33.3 GM/DL — SIGNIFICANT CHANGE UP (ref 32–36)
MCV RBC AUTO: 87.6 FL — SIGNIFICANT CHANGE UP (ref 80–100)
MCV RBC AUTO: 87.6 FL — SIGNIFICANT CHANGE UP (ref 80–100)
PLATELET # BLD AUTO: 349 K/UL — SIGNIFICANT CHANGE UP (ref 150–400)
PLATELET # BLD AUTO: 349 K/UL — SIGNIFICANT CHANGE UP (ref 150–400)
POTASSIUM SERPL-MCNC: 4.4 MMOL/L — SIGNIFICANT CHANGE UP (ref 3.5–5.3)
POTASSIUM SERPL-MCNC: 4.4 MMOL/L — SIGNIFICANT CHANGE UP (ref 3.5–5.3)
POTASSIUM SERPL-SCNC: 4.4 MMOL/L — SIGNIFICANT CHANGE UP (ref 3.5–5.3)
POTASSIUM SERPL-SCNC: 4.4 MMOL/L — SIGNIFICANT CHANGE UP (ref 3.5–5.3)
PROT SERPL-MCNC: 6.9 G/DL — SIGNIFICANT CHANGE UP (ref 6.6–8.7)
PROT SERPL-MCNC: 6.9 G/DL — SIGNIFICANT CHANGE UP (ref 6.6–8.7)
RAPID RVP RESULT: DETECTED
RAPID RVP RESULT: DETECTED
RBC # BLD: 4.45 M/UL — SIGNIFICANT CHANGE UP (ref 3.8–5.2)
RBC # BLD: 4.45 M/UL — SIGNIFICANT CHANGE UP (ref 3.8–5.2)
RBC # FLD: 13.7 % — SIGNIFICANT CHANGE UP (ref 10.3–14.5)
RBC # FLD: 13.7 % — SIGNIFICANT CHANGE UP (ref 10.3–14.5)
RV+EV RNA SPEC QL NAA+PROBE: SIGNIFICANT CHANGE UP
RV+EV RNA SPEC QL NAA+PROBE: SIGNIFICANT CHANGE UP
SARS-COV-2 RNA SPEC QL NAA+PROBE: SIGNIFICANT CHANGE UP
SARS-COV-2 RNA SPEC QL NAA+PROBE: SIGNIFICANT CHANGE UP
SODIUM SERPL-SCNC: 134 MMOL/L — LOW (ref 135–145)
SODIUM SERPL-SCNC: 134 MMOL/L — LOW (ref 135–145)
WBC # BLD: 10.52 K/UL — HIGH (ref 3.8–10.5)
WBC # BLD: 10.52 K/UL — HIGH (ref 3.8–10.5)
WBC # FLD AUTO: 10.52 K/UL — HIGH (ref 3.8–10.5)
WBC # FLD AUTO: 10.52 K/UL — HIGH (ref 3.8–10.5)

## 2023-12-28 PROCEDURE — 36415 COLL VENOUS BLD VENIPUNCTURE: CPT

## 2023-12-28 PROCEDURE — G0463: CPT

## 2023-12-28 PROCEDURE — 59025 FETAL NON-STRESS TEST: CPT

## 2023-12-28 PROCEDURE — 85027 COMPLETE CBC AUTOMATED: CPT

## 2023-12-28 PROCEDURE — 96361 HYDRATE IV INFUSION ADD-ON: CPT

## 2023-12-28 PROCEDURE — 96374 THER/PROPH/DIAG INJ IV PUSH: CPT

## 2023-12-28 PROCEDURE — 99284 EMERGENCY DEPT VISIT MOD MDM: CPT

## 2023-12-28 PROCEDURE — T1013: CPT

## 2023-12-28 PROCEDURE — 99283 EMERGENCY DEPT VISIT LOW MDM: CPT

## 2023-12-28 PROCEDURE — 0225U NFCT DS DNA&RNA 21 SARSCOV2: CPT

## 2023-12-28 PROCEDURE — 80053 COMPREHEN METABOLIC PANEL: CPT

## 2023-12-28 RX ORDER — SODIUM CHLORIDE 9 MG/ML
1000 INJECTION, SOLUTION INTRAVENOUS ONCE
Refills: 0 | Status: DISCONTINUED | OUTPATIENT
Start: 2023-12-28 | End: 2024-01-05

## 2023-12-28 RX ORDER — ACETAMINOPHEN 500 MG
975 TABLET ORAL ONCE
Refills: 0 | Status: DISCONTINUED | OUTPATIENT
Start: 2023-12-28 | End: 2024-01-05

## 2023-12-28 RX ORDER — ACETAMINOPHEN 500 MG
1000 TABLET ORAL ONCE
Refills: 0 | Status: DISCONTINUED | OUTPATIENT
Start: 2023-12-28 | End: 2024-01-12

## 2023-12-28 RX ORDER — SODIUM CHLORIDE 9 MG/ML
500 INJECTION, SOLUTION INTRAVENOUS
Refills: 0 | Status: DISCONTINUED | OUTPATIENT
Start: 2023-12-28 | End: 2024-01-12

## 2023-12-28 RX ORDER — ONDANSETRON 8 MG/1
4 TABLET, FILM COATED ORAL ONCE
Refills: 0 | Status: COMPLETED | OUTPATIENT
Start: 2023-12-28 | End: 2023-12-28

## 2023-12-28 RX ORDER — ACETAMINOPHEN 500 MG
975 TABLET ORAL ONCE
Refills: 0 | Status: DISCONTINUED | OUTPATIENT
Start: 2023-12-28 | End: 2023-12-28

## 2023-12-28 RX ADMIN — ONDANSETRON 4 MILLIGRAM(S): 8 TABLET, FILM COATED ORAL at 16:37

## 2023-12-28 RX ADMIN — SODIUM CHLORIDE 150 MILLILITER(S): 9 INJECTION, SOLUTION INTRAVENOUS at 17:10

## 2023-12-28 NOTE — ED PROVIDER NOTE - PHYSICAL EXAMINATION
Gen: Well appearing in NAD  Head: NC/AT  Neck: trachea midline  Card: regular rate and rhythm  Resp:  No distress, CTAB  Abd: soft, gravid  Ext: no deformities  Neuro:  A&O appears non focal  Skin:  Warm and dry as visualized  Psych:  Normal affect and mood

## 2023-12-28 NOTE — OB PROVIDER TRIAGE NOTE - NSHPPHYSICALEXAM_GEN_ALL_CORE
T(C): 37.1 (12-28-23 @ 15:43), Max: 37.1 (12-28-23 @ 15:33)  HR: 115 (12-28-23 @ 16:29) (109 - 121)  BP: 110/60 (12-28-23 @ 16:23) (107/64 - 112/60)  RR: 24 (12-28-23 @ 15:42) (22 - 24)  SpO2: 99% (12-28-23 @ 16:29) (97% - 100%)  Gen: appear uncomfortable but not in acute distress  Head: dry mucus membranes  Heart: S1 S2, tachycardic  Lungs: CTAB, no nasal flaring or intracostal retractions or increased wob  Abd: soft, gravid  Ext: non-edematous, non-tender   FHT: initial presentation to l&D: baseline 160, + accelerations, no decelarations, minimal variability  tracing at 5:30 pm: baseline 150, + accel, no decel, moderate variability  [place]: non- regular ctx T(C): 37.1 (12-28-23 @ 15:43), Max: 37.1 (12-28-23 @ 15:33)  HR: 115 (12-28-23 @ 16:29) (109 - 121)  BP: 110/60 (12-28-23 @ 16:23) (107/64 - 112/60)  RR: 24 (12-28-23 @ 15:42) (22 - 24)  SpO2: 99% (12-28-23 @ 16:29) (97% - 100%)  Gen: appear uncomfortable but not in acute distress  Head: dry mucus membranes  Heart: S1 S2, tachycardic  Lungs: CTAB, no nasal flaring or intracostal retractions or increased wob  Abd: soft, gravid  Ext: non-edematous, non-tender   FHT: initial presentation to l&D: baseline 160, + accelerations, no decelarations, minimal variability  tracing at 5:30 pm: baseline 150, + accel, no decel, moderate variability  [place]: non- regular ctx

## 2023-12-28 NOTE — OB RN TRIAGE NOTE - PATIENT'S GENDER IDENTITY
Post Acute Skilled Nursing Home Physician subsequent Visit Note     Date of Service: 11/26/2021  Location seen at: [facility]  Subacute / Skilled Need: Rehabilitation    PCP: Mary Carbajal MD   Patient Care Team:  Mary Carbajal MD as PCP - General (Family Practice)  Saw Jackson MD as Dermatology (Dermatology)  Rito Lepe MD as Post Acute Facility Provider: Physician (Geriatric Medicine)  Attending SNF MD: Rito Lepe MD     Ben Rodriguez is a 74 year old male presenting to Post Acute Skilled Nursing for: Rehab, gen weakness    History of Present Illness:     Patient was admitted to [facility] on 10/17/2021 and discharged on 10/25/2021.    74-year-old with history of hypertension, chronic venous wounds presented with generalized weakness.  Patient states that prior to admission he was standing for a period of time and his legs gave out and he fell to the ground.    Neurology was consulted and patient underwent MRI of the brain was negative.  Patient underwent an MRI of the entire spine.  Neurosurgery felt there is no need for surgical intervention.  Neurosurgery recommended outpatient nerve conduction studies.  Patient underwent MRI of the hip which showed degenerative changes in the right hips, Ortho was consulted.  Follow-up outpatient was recommended.    Patient's atenolol dose was decreased to 25 mg daily due to bradycardia.  Patient was discharged for subacute rehab.    The above is summarized from review of discharge summary.    11/26/21- Pt seen and examined in his room. Sitting in his recliner. Pain of R hip continues. Had f/u with ortho, will be scheduled for surgery soon, awaiting date.  Patient has no complaints of chest pain, no shortness of breath, no dizziness, no cough. Patient has a good appetite and has been sleeping well at night. Has been having normal bowel movements and has been voiding freely. PU's are healing well.    Patient's vitals from today reviewed and  entered into chart. Pt's lab work reviewed and entered into chart.  Medications reviewed, reconciled and entered into chart.  D/W Nursing staff, all other concerns addressed.         HISTORY  Past Medical History:   Diagnosis Date   • Allergic rhinitis    • Dyslipidemia    • Generalized weakness 9/11/2018   • History of diverticulitis    • HTN (hypertension)    • Pressure injury of right buttock, stage 3 (CMS/HCC) 10/31/2021   • Primary osteoarthritis of left hip 10/25/2021   • Varicose vein of leg      Social history   reports that he has never smoked. He has never used smokeless tobacco. He reports current alcohol use. He reports that he does not use drugs.     Past Surgical History:   Procedure Laterality Date   • Cholecystectomy     • Colonoscopy  2013   • Shoulder surgery Left    • Tonsillectomy       Family History   Problem Relation Age of Onset   • Other Mother         pulmonary embolism.   • Pneumonia Father    • Gastrointestinal Sister    • Heart Brother    • Patient is unaware of any medical problems Daughter    • Patient is unaware of any medical problems Maternal Grandmother    • Patient is unaware of any medical problems Maternal Grandfather    • Patient is unaware of any medical problems Paternal Grandmother    • Patient is unaware of any medical problems Paternal Grandfather    • Patient is unaware of any medical problems Brother    • Patient is unaware of any medical problems Daughter      PROBLEM LIST:  Specialty Problems     None         DEPRESSION SCREENING:  PHQ 2/9 Test Results  0: Not at all  1: Several days  2: More than half the days  3: Nearly every day  Recent Review Flowsheet Data     Date 1/31/2020    Adult PHQ 2 Score 1    Little interest or pleasure in activity? 1    Feeling down, depressed or hopeless? 0       View Complete Flowsheet ...More Data Exists        DEPRESSION ASSESSMENT/PLAN:  Depression screening is negative no further plan needed.    ALLERGIES:  Allergies as of  11/26/2021   • (No Known Allergies)     CURRENT MEDICATIONS:   Current Outpatient Medications   Medication Sig Dispense Refill   • acetaminophen (TYLENOL) 325 MG tablet Take 2 tablets by mouth every 6 hours as needed.     • atenolol (TENORMIN) 25 MG tablet Take 25 mg by mouth daily.     • simvastatin (ZOCOR) 20 MG tablet TAKE 1 TABLET BY MOUTH EVERY NIGHT 90 tablet 3   • fluticasone (FLONASE) 50 MCG/ACT nasal spray Spray 1 spray in each nostril daily. SHAKE LIQUID 16 g 1   • silver sulfADIAZINE (THERMAZENE) 1 % cream Apply topically daily. Apply to a thickness of 1/16 inch (\"nickel thick\"). 400 g 0   • Cyanocobalamin (VITAMIN B 12 PO) Take 1,000 mcg by mouth daily. Do not start before March 18, 2020.     • Multiple Vitamins-Minerals (CENTRUM SILVER ADULT 50+) Tab Take 1 tablet by mouth daily. Do not start before March 18, 2020.     • VITAMIN E PO Take 200 Int'l Units/day by mouth daily. Do not start before March 18, 2020.     • VITAMIN D, ERGOCALCIFEROL, PO Take 50 mcg by mouth daily. Do not start before March 18, 2020.       No current facility-administered medications for this visit.     Medications reviewed / reconciled: Yes    Current facility medications  Tylenol 650 every 6 as needed  Tylenol 1000 3 times daily  Naproxen 500mg QAM scheduled  Naproxen 500 3 times daily as needed  Lidocaine patch  Zinc oxide cream  Pantoprazole 40 daily  Aspirin 325 daily  Docusate 100 twice daily  MiraLAX 1 packet daily  Atenolol 25 mg daily    BASELINE FUNCTIONAL STATUS:  Walker    CURRENT FUNCTIONAL STATUS:  Weight bearing as tolerated (WBAT)    DIET:  Consistency: General   Type: regular  Appetite: Fair    REVIEW OF SYSTEMS:  Review of Systems   Constitutional: Positive for activity change. Negative for chills and fever.   HENT: Negative for congestion, ear discharge, ear pain, rhinorrhea, sinus pressure, sinus pain and sore throat.    Eyes: Negative for photophobia and redness.   Respiratory: Negative for cough, shortness  of breath and wheezing.    Cardiovascular: Negative for chest pain and palpitations.   Gastrointestinal: Negative for constipation, diarrhea, nausea and vomiting.   Endocrine: Negative for polydipsia, polyphagia and polyuria.   Genitourinary: Negative for frequency and urgency.   Musculoskeletal: Positive for arthralgias. Negative for back pain.   Skin: Positive for wound. Negative for color change and rash.   Neurological: Positive for weakness. Negative for dizziness, light-headedness and headaches.   Psychiatric/Behavioral: Negative for agitation and sleep disturbance. The patient is not nervous/anxious.      PHYSICAL ASSESSMENT:  GENERAL : AAOx3, NAD  HEENT: NC/AT, EOMI. MMM  NECK: supple, no JVD, no lymphadenopathy    LUNGS: CTAB, no wheezes, rhonchi, or rales   CARDIOVASCULAR: RRR, S1,S2 heard, no rubs/murmurs/gallops   ABDOMEN: soft, NT,ND, BS+.   NEUROLOGIC: no focal deficits, CN 2-12 grossly intact.  Moving all extremities equally   LE: no edema bilaterally.  SKIN: Left lateral leg with 1 x 1 cm wound.  Chronic venous stasis thickened skin.    VITALS:  Visit Vitals  BP (!) 140/80   Pulse 90   Temp 97 °F (36.1 °C)   Resp 20   SpO2 96%     Pain Level 3 /10    LABS:  11/22/2021  CBC-WBC 10.1 hemoglobin 13.6 hematocrit 42 platelets 164  CMP-sodium 138 potassium 4.1 chloride 104 CO2 25 BUN 18 creatinine 0.7 glucose 92    11/15/2021  CBC-WBC 10.4 hemoglobin 14.3 hematocrit 44 platelets 146  CMP-sodium 137 potassium 4.2 chloride 105 CO2 25 BUN 19 creatinine 0.7 glucose 89    11/8/2021  CBC-WBC 12.5 hemoglobin 13.5 hematocrit 43.4 162  CMP-sodium 137 potassium 4.6 chloride 103 CO2 25 BUN 21 creatinine is 1.0 glucose 83    11/1/2021  CBC-WBC 13.2 hemoglobin 15.1 hematocrit 46.8 platelets 171  CMP-sodium 138 potassium 4.1 chloride 102 CO2 24 BUN 23 creatinine 0.8 glucose 62    Urine culture-no growth  10/28/2021  CBC-WBC 12.6 hemoglobin 14.6 hematocrit 47 platelets 178    2 view chest x-ray-no acute  process    10/27/2021  CBC-WBC 15.1 hemoglobin 14.9 hematocrit 44.9 platelets 180  Prealbumin-26    10/26/21  CBC-WBC 12.2 hemoglobin 13.8 platelets 184  CMP-sodium 137 potassium 5.1 chloride 106 CO2 24 BUN 15 creatinine 0.7    ASSESSMENT AND PLAN    #Leukocytosis-resolved  15.1--12.6--13.2-->12.5-->10.4-->10.1  Urine culture-no growth, 2 view chest x-ray-no acute process  Patient has been afebrile no new symptoms  Wound cultures-staph aureus and Citrobacter koseri  completed course of Bactrim and ciprofloxacin-EOT 11/12/2021    #Generalized weakness  Status post MRI brain, spine-foraminal stenosis noted  nonsurgical per neurosurgery  Recommended outpatient nerve conduction studies-f/u 1/27/22  Continue PT OT  Current level of function-11/15/2021  Bed mobility-max assist x2.  Transfers-mod to max assist.  Gait-18 feet in parallel bars with min assist.  Lower body dressing-mod to max assist.  Lower body bathing-mod assist.    Current level of function-11/11/2021  Bed mobility-mod to max assist. Transfers-max assist x1-2  Gait-pregait standing tolerance.  Lower body dressing-max assist. Lower body bathing-max assist.    CLOF-11/8/21  Bed mobility-mod to max assist  Transfers-max to max x2 assist  Gait-pregait  Lower body dressing max assist.  Lower body bathing-max assist.    Current level of function-11/1/21  bed mobility-mod to max assist.  Transfers-sit to stand-max assist  Gait-none.  Lower body dressing-max to dependent.  Lower body bathing max    Current level of function-10/28/21-  Bed mobility-max to dependent.  Transfers-unable.  Gait-unable.  Lower body dressing dependent.  Lower body bathing-max assist.    #Osteoarthritis of right hip  MRI showed severe degenerative changes  follow-up with Ortho outpatient-11/24/2021-plans to schedule R hip arthroplasty  Pain control-Tylenol 1000 3 times daily + Naproxen 500 qam  Naproxen 500 3 times daily as needed  Tylenol 650 as needed for breakthrough pain, lidocaine  patch  Bowel regimen-Docusate 100 twice daily, MiraLAX 1 packet daily    #pressure ulcer-healing well  L-Unstageable; R: Stage III  Continue zinc oxide, offloading  Wound MD to follow in facility  Wound cultures growing staph aureus and Citrobacter-completed antibiotics    #Chronic bilateral venous stasis wounds  Wound care to follow in facility    #Essential hypertension  Patient's atenolol dose decreased to 25 mg daily due to bradycardia  Continue atenolol 25 mg daily  Blood pressure well controlled today, continue to monitor    #GERD-PPI    FOLLOW UP APPOINTMENTS:  Ortho  To schedule surgery  EMG outpatient. Neurology f/u on 22  PCP on discharge    DISCHARGE PLANNING: Home with family and home health-    Discussed with: RN / Nursing, Patient and Reviewed old records    Prognosis: good    Total time spent is more than 40 minutes, with more than 50% of the time spent in coordination of care, counseling, review of records and discussion of plan of care with the patient /staff /family.    Rito Lepe MD    HOME HEALTH FACE TO FACE DOCUMENTATION AND CERTIFICATION  CMS Rules: Certifying Patients for the Medicare Home Health Benefit    Home Health Eligibility Summary    Name: Ben Rodriguez  : 1947  MRN: [id number]    Service to Home Care order content:  SERVICE TO HOME CARE   Ordered at: 21 4303     Patient will be seen within 24 - 48 hours unless new date specified here:    2021      Additional home health services needed:    Occupational therapy     Home Health Aide     Social Work      Nursing needs:    Comprehensive nursing assessment/medication management      Physical therapy needs:    Eval and Treat     Falls prevention      Select home health services needed:    Nursing     Physical therapy      Provider to follow patients home care:    OSWALD SHEPPARD      Face to Face encounter completed on:    2021      Occupational therapy needs:    Eval and Treat       needs:     [facility]      Home health aide needs:    Personal cares      Reason for home bound status (please select all that apply):    Requires aid of supportive device(s)      Certification of Face to Face:    I certify this patient had a Face to Face encounter performed on the date specified above by a physician or Medicare allowed non-physician practitioner, related to the primary reason the patient requires home health services.      Certification of Home Bound Status:    I certify, based on my clinical findings,  this patient is confined to his/her home due to the homebound reason(s) identified. Leaving the home is a significant and challenging effort for the patient.      Has a face to face encounter been completed?    Yes      Supportive device(s) needed:    Walker      Patient Preference:    [facility]      Home care service needed:    Home Health        I certify this patient is under my care and  I, or a nurse practitioner, clinical nurse specialist or physician assistant working with me, had a face-to-face encounter with this patient on the date listed.    Medical Condition Related to Home Health Services  The encounter with the patient was in whole, or in part, for the following medical condition(s), which are the reasons for home health care:   1. Generalized weakness    2. Pressure injury of right buttock, stage 3 (CMS/HCC)    3. Primary osteoarthritis of left hip    4. Primary hypertension    5. Varicose veins of both lower extremities, unspecified whether complicated           Certification of Medical Necessity(Certification is required for select Home Health beneficiaries only)  I certify based on my clinical findings, the services ordered in the referral are medically necessary home health services.    Homebound Status and Living Situation  I certify my clinical findings support this patient is homebound due to the homebound reason(s) listed in the Service to Home Care order content link  above.  If reason for homebound status is not specified, this document does not certify the patient as homebound.    I have authorized these skilled home health services and the \"Physician to follow\" listed above will accept and assume care for this patient and sign the Home Health Plan of Care.    Date of completion of form: 11/29/2021               Female

## 2023-12-28 NOTE — OB PROVIDER TRIAGE NOTE - ATTENDING COMMENTS
influenza A +  fetal tachycardia resolved   EFM reactive   no contractions  plan   dc home on Tamiflu  follow up in clinic next week

## 2023-12-28 NOTE — OB PROVIDER TRIAGE NOTE - HISTORY OF PRESENT ILLNESS
34 yo  with pmh of asthma, 3 prior c-sections and 1 spontaneous miscarriage presented to [facility] ed for sob and myalgias.   Symptoms began yesterday am. No measured fevers at home. 1 episode nausea and vomiting this am. Pt took albuterol inhaler and 1 dose tylenol in am. Pt went to ED given sob.  Workup found fetal tachycardia to 160 on ED FHT.   Found to be flu +.     Pt sent to L&D for further workup.     Pt denies palpitation and cp.  Pt denies vaginal bleeding, contractions and leakage of fluid. She endorses good fetal movement.   Pt denies trauma.   Pt denies visual disturbances,and new-onset edema.   She denies any urinary complaints.     She denies fevers.    She denies shortness of breath, chest pain, and palpitations.    Pregnancy course is  uncomplicated.   Pregnancy course is significant for:      PGYN: -fibroids/-cysts, denied STD hx, denies abnormal PAPs  PMH: asthma  PSH: 3 c-sections  SH: Denies tobacco use, EtOH use and illicit drug use during the pregnancy; Feels safe at home  Meds: prn albuterol, pnv  All: nkda     34 yo  with pmh of asthma, 3 prior c-sections and 1 spontaneous miscarriage presented to [facility] ed for sob and myalgias.   Symptoms began yesterday am. No measured fevers at home. 1 episode nausea and vomiting this am. Pt took albuterol inhaler and 1 dose tylenol in am. Pt went to ED given sob.  Workup found fetal tachycardia to 160 on ED FHT.   Found to be flu +.     Pt sent to L&D for further workup.     Pt denies palpitation and cp.  Pt denies vaginal bleeding, contractions and leakage of fluid. She endorses good fetal movement.   Pt denies trauma.   Pt denies visual disturbances,and new-onset edema.   She denies any urinary complaints.     She denies fevers.    She denies shortness of breath, chest pain, and palpitations.    Pregnancy course is  uncomplicated.   Pregnancy course is significant for:      PGYN: -fibroids/-cysts, denied STD hx, denies abnormal PAPs  PMH: asthma  PSH: 3 c-sections  SH: Denies tobacco use, EtOH use and illicit drug use during the pregnancy; Feels safe at home  Meds: prn albuterol, pnv  All: nkda

## 2023-12-28 NOTE — OB PROVIDER TRIAGE NOTE - NSHPLABSRESULTS_GEN_ALL_CORE
LABS:     Rapid RVP Result: Detected  SARS-CoV-2: NotDetec:   Adenovirus (RapRVP): NotDetec  Influenza A (RapRVP): Detected  Influenza AH1 2009 (RapRVP): NotDetec  Influenza AH1 (RapRVP): NotDetec  Influenza AH3 (RapRVP): Detected  Influenza B (RapRVP): NotDetec  Parainfluenza 1 (RapRVP): NotDetec  Parainfluenza 2 (RapRVP): NotDetec  Parainfluenza 3 (RapRVP): NotDetec  Parainfluenza 4 (RapRVP): NotDetec  Chlamydia pneumoniae (RapRVP): NotDetec  Mycoplasma pneumoniae (RapRVP): NotDetec  Entero/Rhinovirus (RapRVP): NotDetec  hMPV (RapRVP): NotDetec  Coronavirus (229E,HKU1,NL63,OC43): NotDetec               13.0   10.52 )-----------( 349      ( 28 Dec 2023 13:40 )             39.0     12-28    134<L>  |  102  |  3.5<L>  ----------------------------<  89  4.4   |  17.0<L>  |  0.46<L>    Ca    9.1      28 Dec 2023 13:40    TPro  6.9  /  Alb  3.4  /  TBili  0.2<L>  /  DBili  x   /  AST  43<H>  /  ALT  35<H>  /  AlkPhos  100  12-28      Urinalysis Basic - ( 28 Dec 2023 13:40 )    Color: x / Appearance: x / SG: x / pH: x  Gluc: 89 mg/dL / Ketone: x  / Bili: x / Urobili: x   Blood: x / Protein: x / Nitrite: x   Leuk Esterase: x / RBC: x / WBC x   Sq Epi: x / Non Sq Epi: x / Bacteria: x            RADIOLOGY, EKG & ADDITIONAL TESTS: Reviewed.

## 2023-12-28 NOTE — OB RN TRIAGE NOTE - FALL HARM RISK - UNIVERSAL INTERVENTIONS
Bed in lowest position, wheels locked, appropriate side rails in place/Call bell, personal items and telephone in reach/Instruct patient to call for assistance before getting out of bed or chair/Non-slip footwear when patient is out of bed/[place] to call system/Physically safe environment - no spills, clutter or unnecessary equipment/Purposeful Proactive Rounding/Room/bathroom lighting operational, light cord in reach Bed in lowest position, wheels locked, appropriate side rails in place/Call bell, personal items and telephone in reach/Instruct patient to call for assistance before getting out of bed or chair/Non-slip footwear when patient is out of bed/[place] to call system/Physically safe environment - no spills, clutter or unnecessary equipment/Purposeful Proactive Rounding/Room/bathroom lighting operational, light cord in reach

## 2023-12-28 NOTE — ED PROVIDER NOTE - PROGRESS NOTE DETAILS
Jaya Amor for ED attending, Dr. Acevedo: case discussed with L&D, state to keep pt in ED and they will see patient here. PT evaluated by intake physician. HPI/ROS/PE as noted above.     Lab results reviewed: CMP and CBC with no significant abnormalities; RVP pending. Patient had NST done in ED, fetus tachycardic on monitor - patient to be discharged from ED and taken to L&D for further treatment and monitoring. PT evaluated by intake physician. HPI/ROS/PE as noted above.     Lab results reviewed: CMP and CBC with no significant abnormalities; RVP pending. Patient had NST done in ED, fetus tachycardic on monitor - patient to be discharged from ED and taken to L&D for further treatment and monitoring. Patient noted to be tachycardic, likely secondary to viral syndrome, will receive hydration and further treatment in L&D.

## 2023-12-28 NOTE — ED PROVIDER NOTE - OBJECTIVE STATEMENT
34 y/o female 30 weeks pregnant, with PMHx of asthma presents to the ED c/o cough, congestion, body aches, back pain. Pt went to L&D and was redirected to the ED for further evaluation. Pt denies vaginal bleeding, leaking of fluids. Pt last took Tylenol at 03:00. Pt used her inhaler for her SOB with relief

## 2023-12-28 NOTE — ED PROVIDER NOTE - NSFOLLOWUPINSTRUCTIONS_ED_ALL_ED_FT
Take Tylenol over the counter for pain as needed. Call to make an appointment to follow up with your OBGYN. Return to ER if symptoms worsen.     [place] Tylenol sin receta para el dolor según sea necesario. Llame para programar kristin franchesca de seguimiento con gann obstetra. Regrese a la [place] de emergencias si los síntomas empeoran. Take Tylenol over the counter for pain as needed. Call to make an appointment to follow up with your OBGYN. Return to ER if symptoms worsen.     [place] Tylenol sin receta para el dolor según sea necesario. Llame para programar kristin franchesca de seguimiento con gann obstetra. Regrese a la [place] de emergencias si los síntomas empeoran.

## 2023-12-28 NOTE — OB PROVIDER TRIAGE NOTE - NS_OBGYNHISTORY_OBGYN_ALL_OB_FT
followed by   [facility]  4 prior pregnancies  3 sections: 1st  due to fetal macrosomia at 10lb  1 spontaneous miscarriage; passed products of conception naturally  2x pre-term deliveries. Last one due to pre-eclampsia    No complications this pregnancy followed by   [facility]  4 prior pregnancies  3 sections: 1st  due to fetal macrosomia at 10lb  1 spontaneous miscarriage; passed products of conception naturally  2x pre-term deliveries. Last one due to pre-eclampsia    No complications this pregnancy

## 2023-12-28 NOTE — OB PROVIDER TRIAGE NOTE - NSOBPROVIDERNOTE_OBGYN_ALL_OB_FT
A/P: 32 yo  with pmh of asthma, 3 prior c-sections and 1 spontaneous miscarriage presented to [facility] ed for sob and myalgias. Workup found fetal tachycardia to 160 on ED FHT and flu+.     On initial presentation to l&D; pt had fetal tachycardia: 160, and minimal variability. Pt bolus 1 L fluids and treated symptomatically with zofran and ofirmev.   FHT: fetal tachycardia resolved subsequently tracing at 5:30 pm: baseline 150, moderate variability: reactive and reassuring. Suspect fetal and maternal tachycardia is physiologic.  - flu A positive; tamiflu sent to pharmacy given sx start 1.5 days ago    Dispo: Continue to observe.     Discussed with Dr. Rg A/P: 34 yo  with pmh of asthma, 3 prior c-sections and 1 spontaneous miscarriage presented to [facility] ed for sob and myalgias. Workup found fetal tachycardia to 160 on ED FHT and flu+.     On initial presentation to l&D; pt had fetal tachycardia: 160, and minimal variability. Pt bolus 1 L fluids and treated symptomatically with zofran and ofirmev.   FHT: fetal tachycardia resolved subsequently tracing at 5:30 pm: baseline 150, moderate variability: reactive and reassuring. Suspect fetal and maternal tachycardia is physiologic.  - flu A positive; tamiflu sent to pharmacy given sx start 1.5 days ago    Dispo: Continue to observe.     Discussed with Dr. Rg

## 2023-12-28 NOTE — ED ADULT TRIAGE NOTE - CHIEF COMPLAINT QUOTE
pt sent from L&D for cough and shortness of breath. pt also c/o pelvic pain. pt states she is 30 weeks and 3 days pregnant. pt states she used inhaler 40 mins pta and helped relieved shortness of breath.

## 2023-12-28 NOTE — ED PROVIDER NOTE - PATIENT PORTAL LINK FT
You can access the FollowMyHealth Patient Portal offered by [facility] by registering at the following website: [email or web address]. By joining [employer]’s FollowMyHealth portal, you will also be able to view your health information using other applications (apps) compatible with our system. You can access the FollowMyHealth Patient Portal offered by [facility] by registering at the following website: [email or web address]. By joining [employer]’s FollowMyHealth portal, you will also be able to view your health information using other applications (apps) compatible with our system.

## 2023-12-28 NOTE — ED PROVIDER NOTE - NS_ ATTENDINGSCRIBEDETAILS _ED_A_ED_FT
I, Reece Acevedo, performed the initial face to face bedside interview with this patient regarding history of present illness, review of symptoms and relevant past medical, social and family history.  I completed an independent physical examination.  I was the initial provider who evaluated this patient. I have signed out the follow up of any pending tests (i.e. labs, radiological studies) to the ACP.  I have communicated the patient’s plan of care and disposition with the ACP.  The history, relevant review of systems, past medical and surgical history, medical decision making, and physical examination was documented by the scribe in my presence and I attest to the accuracy of the documentation.

## 2023-12-29 DIAGNOSIS — J45.909 UNSPECIFIED ASTHMA, UNCOMPLICATED: ICD-10-CM

## 2023-12-29 DIAGNOSIS — O09.293 SUPERVISION OF PREGNANCY WITH OTHER POOR REPRODUCTIVE OR OBSTETRIC HISTORY, THIRD TRIMESTER: ICD-10-CM

## 2023-12-29 DIAGNOSIS — O98.513 OTHER VIRAL DISEASES COMPLICATING PREGNANCY, THIRD TRIMESTER: ICD-10-CM

## 2023-12-29 DIAGNOSIS — J11.1 INFLUENZA DUE TO UNIDENTIFIED INFLUENZA VIRUS WITH OTHER RESPIRATORY MANIFESTATIONS: ICD-10-CM

## 2023-12-29 DIAGNOSIS — Z3A.31 31 WEEKS GESTATION OF PREGNANCY: ICD-10-CM

## 2023-12-29 DIAGNOSIS — O34.219 MATERNAL CARE FOR UNSPECIFIED TYPE SCAR FROM PREVIOUS CESAREAN DELIVERY: ICD-10-CM

## 2023-12-29 DIAGNOSIS — O99.513 DISEASES OF THE RESPIRATORY SYSTEM COMPLICATING PREGNANCY, THIRD TRIMESTER: ICD-10-CM

## 2024-01-09 ENCOUNTER — ASOB RESULT (OUTPATIENT)
Age: 34
End: 2024-01-09

## 2024-01-09 ENCOUNTER — APPOINTMENT (OUTPATIENT)
Dept: ANTEPARTUM | Facility: CLINIC | Age: 34
End: 2024-01-09
Payer: MEDICAID

## 2024-01-09 PROBLEM — O03.9 COMPLETE OR UNSPECIFIED SPONTANEOUS ABORTION WITHOUT COMPLICATION: Chronic | Status: ACTIVE | Noted: 2023-12-28

## 2024-01-09 PROCEDURE — 76816 OB US FOLLOW-UP PER FETUS: CPT

## 2024-01-31 ENCOUNTER — TRANSCRIPTION ENCOUNTER (OUTPATIENT)
Age: 34
End: 2024-01-31

## 2024-02-01 ENCOUNTER — INPATIENT (INPATIENT)
Facility: HOSPITAL | Age: 34
LOS: 2 days | Discharge: HOME | End: 2024-02-04
Attending: OBSTETRICS & GYNECOLOGY | Admitting: OBSTETRICS & GYNECOLOGY
Payer: COMMERCIAL

## 2024-02-01 ENCOUNTER — TRANSCRIPTION ENCOUNTER (OUTPATIENT)
Age: 34
End: 2024-02-01

## 2024-02-01 VITALS — HEART RATE: 96 BPM | DIASTOLIC BLOOD PRESSURE: 67 MMHG | SYSTOLIC BLOOD PRESSURE: 119 MMHG

## 2024-02-01 DIAGNOSIS — O26.899 OTHER SPECIFIED PREGNANCY RELATED CONDITIONS, UNSPECIFIED TRIMESTER: ICD-10-CM

## 2024-02-01 DIAGNOSIS — Z98.891 HISTORY OF UTERINE SCAR FROM PREVIOUS SURGERY: Chronic | ICD-10-CM

## 2024-02-01 DIAGNOSIS — O26.893 OTHER SPECIFIED PREGNANCY RELATED CONDITIONS, THIRD TRIMESTER: ICD-10-CM

## 2024-02-01 LAB
ALBUMIN SERPL ELPH-MCNC: 3.4 G/DL — SIGNIFICANT CHANGE UP (ref 3.3–5.2)
ALP SERPL-CCNC: 117 U/L — SIGNIFICANT CHANGE UP (ref 40–120)
ALT FLD-CCNC: 19 U/L — SIGNIFICANT CHANGE UP
ANION GAP SERPL CALC-SCNC: 17 MMOL/L — SIGNIFICANT CHANGE UP (ref 5–17)
APPEARANCE UR: ABNORMAL
APTT BLD: 29 SEC — SIGNIFICANT CHANGE UP (ref 24.5–35.6)
AST SERPL-CCNC: 19 U/L — SIGNIFICANT CHANGE UP
BACTERIA # UR AUTO: NEGATIVE /HPF — SIGNIFICANT CHANGE UP
BASOPHILS # BLD AUTO: 0.05 K/UL — SIGNIFICANT CHANGE UP (ref 0–0.2)
BASOPHILS # BLD AUTO: 0.1 K/UL — SIGNIFICANT CHANGE UP (ref 0–0.2)
BASOPHILS NFR BLD AUTO: 0.5 % — SIGNIFICANT CHANGE UP (ref 0–2)
BASOPHILS NFR BLD AUTO: 0.9 % — SIGNIFICANT CHANGE UP (ref 0–2)
BILIRUB SERPL-MCNC: <0.2 MG/DL — LOW (ref 0.4–2)
BILIRUB UR-MCNC: NEGATIVE — SIGNIFICANT CHANGE UP
BLD GP AB SCN SERPL QL: SIGNIFICANT CHANGE UP
BUN SERPL-MCNC: 5.7 MG/DL — LOW (ref 8–20)
BURR CELLS BLD QL SMEAR: PRESENT — SIGNIFICANT CHANGE UP
CALCIUM SERPL-MCNC: 9.4 MG/DL — SIGNIFICANT CHANGE UP (ref 8.4–10.5)
CHLORIDE SERPL-SCNC: 101 MMOL/L — SIGNIFICANT CHANGE UP (ref 96–108)
CO2 SERPL-SCNC: 18 MMOL/L — LOW (ref 22–29)
COLOR SPEC: YELLOW — SIGNIFICANT CHANGE UP
CREAT ?TM UR-MCNC: 53 MG/DL — SIGNIFICANT CHANGE UP
CREAT SERPL-MCNC: 0.41 MG/DL — LOW (ref 0.5–1.3)
DIFF PNL FLD: NEGATIVE — SIGNIFICANT CHANGE UP
EGFR: 132 ML/MIN/1.73M2 — SIGNIFICANT CHANGE UP
EOSINOPHIL # BLD AUTO: 0.07 K/UL — SIGNIFICANT CHANGE UP (ref 0–0.5)
EOSINOPHIL # BLD AUTO: 0.1 K/UL — SIGNIFICANT CHANGE UP (ref 0–0.5)
EOSINOPHIL NFR BLD AUTO: 0.6 % — SIGNIFICANT CHANGE UP (ref 0–6)
EOSINOPHIL NFR BLD AUTO: 0.9 % — SIGNIFICANT CHANGE UP (ref 0–6)
FIBRINOGEN PPP-MCNC: 616 MG/DL — HIGH (ref 200–450)
GLUCOSE SERPL-MCNC: 81 MG/DL — SIGNIFICANT CHANGE UP (ref 70–99)
GLUCOSE UR QL: NEGATIVE MG/DL — SIGNIFICANT CHANGE UP
HCT VFR BLD CALC: 38.7 % — SIGNIFICANT CHANGE UP (ref 34.5–45)
HCT VFR BLD CALC: 38.9 % — SIGNIFICANT CHANGE UP (ref 34.5–45)
HGB BLD-MCNC: 13 G/DL — SIGNIFICANT CHANGE UP (ref 11.5–15.5)
HGB BLD-MCNC: 13.3 G/DL — SIGNIFICANT CHANGE UP (ref 11.5–15.5)
IMM GRANULOCYTES NFR BLD AUTO: 2.3 % — HIGH (ref 0–0.9)
INR BLD: 0.91 RATIO — SIGNIFICANT CHANGE UP (ref 0.85–1.18)
KETONES UR-MCNC: 15 MG/DL
LDH SERPL L TO P-CCNC: 166 U/L — SIGNIFICANT CHANGE UP (ref 98–192)
LEUKOCYTE ESTERASE UR-ACNC: NEGATIVE — SIGNIFICANT CHANGE UP
LYMPHOCYTES # BLD AUTO: 1.69 K/UL — SIGNIFICANT CHANGE UP (ref 1–3.3)
LYMPHOCYTES # BLD AUTO: 1.99 K/UL — SIGNIFICANT CHANGE UP (ref 1–3.3)
LYMPHOCYTES # BLD AUTO: 15.8 % — SIGNIFICANT CHANGE UP (ref 13–44)
LYMPHOCYTES # BLD AUTO: 18.3 % — SIGNIFICANT CHANGE UP (ref 13–44)
MANUAL SMEAR VERIFICATION: SIGNIFICANT CHANGE UP
MCHC RBC-ENTMCNC: 28.8 PG — SIGNIFICANT CHANGE UP (ref 27–34)
MCHC RBC-ENTMCNC: 29.6 PG — SIGNIFICANT CHANGE UP (ref 27–34)
MCHC RBC-ENTMCNC: 33.6 GM/DL — SIGNIFICANT CHANGE UP (ref 32–36)
MCHC RBC-ENTMCNC: 34.2 GM/DL — SIGNIFICANT CHANGE UP (ref 32–36)
MCV RBC AUTO: 85.8 FL — SIGNIFICANT CHANGE UP (ref 80–100)
MCV RBC AUTO: 86.4 FL — SIGNIFICANT CHANGE UP (ref 80–100)
METAMYELOCYTES # FLD: 0.9 % — HIGH (ref 0–0)
MONOCYTES # BLD AUTO: 0.81 K/UL — SIGNIFICANT CHANGE UP (ref 0–0.9)
MONOCYTES # BLD AUTO: 0.84 K/UL — SIGNIFICANT CHANGE UP (ref 0–0.9)
MONOCYTES NFR BLD AUTO: 7.5 % — SIGNIFICANT CHANGE UP (ref 2–14)
MONOCYTES NFR BLD AUTO: 7.9 % — SIGNIFICANT CHANGE UP (ref 2–14)
NEUTROPHILS # BLD AUTO: 7.68 K/UL — HIGH (ref 1.8–7.4)
NEUTROPHILS # BLD AUTO: 7.68 K/UL — HIGH (ref 1.8–7.4)
NEUTROPHILS NFR BLD AUTO: 70.8 % — SIGNIFICANT CHANGE UP (ref 43–77)
NEUTROPHILS NFR BLD AUTO: 71.9 % — SIGNIFICANT CHANGE UP (ref 43–77)
NITRITE UR-MCNC: NEGATIVE — SIGNIFICANT CHANGE UP
PH UR: 7 — SIGNIFICANT CHANGE UP (ref 5–8)
PLAT MORPH BLD: NORMAL — SIGNIFICANT CHANGE UP
PLATELET # BLD AUTO: 328 K/UL — SIGNIFICANT CHANGE UP (ref 150–400)
PLATELET # BLD AUTO: 342 K/UL — SIGNIFICANT CHANGE UP (ref 150–400)
POLYCHROMASIA BLD QL SMEAR: SLIGHT — SIGNIFICANT CHANGE UP
POTASSIUM SERPL-MCNC: 3.6 MMOL/L — SIGNIFICANT CHANGE UP (ref 3.5–5.3)
POTASSIUM SERPL-SCNC: 3.6 MMOL/L — SIGNIFICANT CHANGE UP (ref 3.5–5.3)
PROT ?TM UR-MCNC: 9 MG/DL — SIGNIFICANT CHANGE UP (ref 0–12)
PROT SERPL-MCNC: 6.5 G/DL — LOW (ref 6.6–8.7)
PROT UR-MCNC: NEGATIVE MG/DL — SIGNIFICANT CHANGE UP
PROT/CREAT UR-RTO: 0.2 RATIO — SIGNIFICANT CHANGE UP
PROTHROM AB SERPL-ACNC: 10.1 SEC — SIGNIFICANT CHANGE UP (ref 9.5–13)
RBC # BLD: 4.5 M/UL — SIGNIFICANT CHANGE UP (ref 3.8–5.2)
RBC # BLD: 4.51 M/UL — SIGNIFICANT CHANGE UP (ref 3.8–5.2)
RBC # FLD: 13.2 % — SIGNIFICANT CHANGE UP (ref 10.3–14.5)
RBC # FLD: 13.2 % — SIGNIFICANT CHANGE UP (ref 10.3–14.5)
RBC BLD AUTO: ABNORMAL
RBC CASTS # UR COMP ASSIST: 1 /HPF — SIGNIFICANT CHANGE UP (ref 0–4)
SODIUM SERPL-SCNC: 136 MMOL/L — SIGNIFICANT CHANGE UP (ref 135–145)
SP GR SPEC: 1.01 — SIGNIFICANT CHANGE UP (ref 1–1.03)
SQUAMOUS # UR AUTO: 9 /HPF — HIGH (ref 0–5)
URATE SERPL-MCNC: 4.3 MG/DL — SIGNIFICANT CHANGE UP (ref 2.4–5.7)
UROBILINOGEN FLD QL: 0.2 MG/DL — SIGNIFICANT CHANGE UP (ref 0.2–1)
VARIANT LYMPHS # BLD: 1.7 % — SIGNIFICANT CHANGE UP (ref 0–6)
WBC # BLD: 10.68 K/UL — HIGH (ref 3.8–10.5)
WBC # BLD: 10.85 K/UL — HIGH (ref 3.8–10.5)
WBC # FLD AUTO: 10.68 K/UL — HIGH (ref 3.8–10.5)
WBC # FLD AUTO: 10.85 K/UL — HIGH (ref 3.8–10.5)
WBC UR QL: 1 /HPF — SIGNIFICANT CHANGE UP (ref 0–5)

## 2024-02-01 PROCEDURE — 88302 TISSUE EXAM BY PATHOLOGIST: CPT | Mod: 26

## 2024-02-01 PROCEDURE — 88305 TISSUE EXAM BY PATHOLOGIST: CPT | Mod: 26

## 2024-02-01 DEVICE — SURGICEL 2 X 14": Type: IMPLANTABLE DEVICE | Status: FUNCTIONAL

## 2024-02-01 RX ORDER — OXYCODONE HYDROCHLORIDE 5 MG/1
5 TABLET ORAL
Refills: 0 | Status: COMPLETED | OUTPATIENT
Start: 2024-02-01 | End: 2024-02-08

## 2024-02-01 RX ORDER — CITRIC ACID/SODIUM CITRATE 300-500 MG
30 SOLUTION, ORAL ORAL ONCE
Refills: 0 | Status: COMPLETED | OUTPATIENT
Start: 2024-02-01 | End: 2024-02-01

## 2024-02-01 RX ORDER — DIPHENHYDRAMINE HCL 50 MG
25 CAPSULE ORAL EVERY 6 HOURS
Refills: 0 | Status: DISCONTINUED | OUTPATIENT
Start: 2024-02-01 | End: 2024-02-04

## 2024-02-01 RX ORDER — CEFAZOLIN SODIUM 1 G
2000 VIAL (EA) INJECTION ONCE
Refills: 0 | Status: COMPLETED | OUTPATIENT
Start: 2024-02-01 | End: 2024-02-01

## 2024-02-01 RX ORDER — IBUPROFEN 200 MG
600 TABLET ORAL EVERY 6 HOURS
Refills: 0 | Status: COMPLETED | OUTPATIENT
Start: 2024-02-01 | End: 2024-12-30

## 2024-02-01 RX ORDER — ACETAMINOPHEN 500 MG
975 TABLET ORAL ONCE
Refills: 0 | Status: COMPLETED | OUTPATIENT
Start: 2024-02-01 | End: 2024-02-01

## 2024-02-01 RX ORDER — LANOLIN
1 OINTMENT (GRAM) TOPICAL EVERY 6 HOURS
Refills: 0 | Status: DISCONTINUED | OUTPATIENT
Start: 2024-02-01 | End: 2024-02-04

## 2024-02-01 RX ORDER — CEFAZOLIN SODIUM 1 G
2000 VIAL (EA) INJECTION ONCE
Refills: 0 | Status: DISCONTINUED | OUTPATIENT
Start: 2024-02-01 | End: 2024-02-01

## 2024-02-01 RX ORDER — MAGNESIUM HYDROXIDE 400 MG/1
30 TABLET, CHEWABLE ORAL
Refills: 0 | Status: DISCONTINUED | OUTPATIENT
Start: 2024-02-01 | End: 2024-02-04

## 2024-02-01 RX ORDER — SODIUM CHLORIDE 9 MG/ML
1000 INJECTION, SOLUTION INTRAVENOUS
Refills: 0 | Status: DISCONTINUED | OUTPATIENT
Start: 2024-02-01 | End: 2024-02-02

## 2024-02-01 RX ORDER — SODIUM CHLORIDE 9 MG/ML
1000 INJECTION, SOLUTION INTRAVENOUS
Refills: 0 | Status: DISCONTINUED | OUTPATIENT
Start: 2024-02-01 | End: 2024-02-04

## 2024-02-01 RX ORDER — SODIUM CHLORIDE 9 MG/ML
1000 INJECTION, SOLUTION INTRAVENOUS ONCE
Refills: 0 | Status: COMPLETED | OUTPATIENT
Start: 2024-02-01 | End: 2024-02-01

## 2024-02-01 RX ORDER — ACETAMINOPHEN 500 MG
975 TABLET ORAL
Refills: 0 | Status: DISCONTINUED | OUTPATIENT
Start: 2024-02-01 | End: 2024-02-04

## 2024-02-01 RX ORDER — CHLORHEXIDINE GLUCONATE 213 G/1000ML
1 SOLUTION TOPICAL DAILY
Refills: 0 | Status: DISCONTINUED | OUTPATIENT
Start: 2024-02-01 | End: 2024-02-02

## 2024-02-01 RX ORDER — OXYTOCIN 10 UNIT/ML
333.33 VIAL (ML) INJECTION
Qty: 20 | Refills: 0 | Status: COMPLETED | OUTPATIENT
Start: 2024-02-01 | End: 2024-02-02

## 2024-02-01 RX ORDER — ONDANSETRON 8 MG/1
4 TABLET, FILM COATED ORAL EVERY 6 HOURS
Refills: 0 | Status: COMPLETED | OUTPATIENT
Start: 2024-02-02 | End: 2024-02-03

## 2024-02-01 RX ORDER — ONDANSETRON 8 MG/1
4 TABLET, FILM COATED ORAL EVERY 6 HOURS
Refills: 0 | Status: DISCONTINUED | OUTPATIENT
Start: 2024-02-02 | End: 2024-02-04

## 2024-02-01 RX ORDER — ALBUTEROL 90 UG/1
2 AEROSOL, METERED ORAL EVERY 6 HOURS
Refills: 0 | Status: DISCONTINUED | OUTPATIENT
Start: 2024-02-01 | End: 2024-02-04

## 2024-02-01 RX ORDER — AZITHROMYCIN 500 MG/1
500 TABLET, FILM COATED ORAL ONCE
Refills: 0 | Status: COMPLETED | OUTPATIENT
Start: 2024-02-01 | End: 2024-02-01

## 2024-02-01 RX ORDER — TETANUS TOXOID, REDUCED DIPHTHERIA TOXOID AND ACELLULAR PERTUSSIS VACCINE, ADSORBED 5; 2.5; 8; 8; 2.5 [IU]/.5ML; [IU]/.5ML; UG/.5ML; UG/.5ML; UG/.5ML
0.5 SUSPENSION INTRAMUSCULAR ONCE
Refills: 0 | Status: DISCONTINUED | OUTPATIENT
Start: 2024-02-01 | End: 2024-02-04

## 2024-02-01 RX ORDER — OXYCODONE HYDROCHLORIDE 5 MG/1
5 TABLET ORAL ONCE
Refills: 0 | Status: DISCONTINUED | OUTPATIENT
Start: 2024-02-01 | End: 2024-02-03

## 2024-02-01 RX ORDER — SODIUM CHLORIDE 9 MG/ML
1000 INJECTION, SOLUTION INTRAVENOUS ONCE
Refills: 0 | Status: DISCONTINUED | OUTPATIENT
Start: 2024-02-01 | End: 2024-02-04

## 2024-02-01 RX ORDER — SIMETHICONE 80 MG/1
80 TABLET, CHEWABLE ORAL EVERY 4 HOURS
Refills: 0 | Status: DISCONTINUED | OUTPATIENT
Start: 2024-02-01 | End: 2024-02-04

## 2024-02-01 RX ORDER — TRANEXAMIC ACID 100 MG/ML
1000 INJECTION, SOLUTION INTRAVENOUS ONCE
Refills: 0 | Status: DISCONTINUED | OUTPATIENT
Start: 2024-02-01 | End: 2024-02-04

## 2024-02-01 RX ORDER — FAMOTIDINE 10 MG/ML
20 INJECTION INTRAVENOUS ONCE
Refills: 0 | Status: COMPLETED | OUTPATIENT
Start: 2024-02-01 | End: 2024-02-01

## 2024-02-01 RX ORDER — TRANEXAMIC ACID 100 MG/ML
1000 INJECTION, SOLUTION INTRAVENOUS ONCE
Refills: 0 | Status: COMPLETED | OUTPATIENT
Start: 2024-02-01 | End: 2024-02-01

## 2024-02-01 RX ORDER — KETOROLAC TROMETHAMINE 30 MG/ML
30 SYRINGE (ML) INJECTION EVERY 6 HOURS
Refills: 0 | Status: DISCONTINUED | OUTPATIENT
Start: 2024-02-01 | End: 2024-02-02

## 2024-02-01 RX ORDER — OXYTOCIN 10 UNIT/ML
333.33 VIAL (ML) INJECTION
Qty: 20 | Refills: 0 | Status: DISCONTINUED | OUTPATIENT
Start: 2024-02-01 | End: 2024-02-04

## 2024-02-01 RX ORDER — SCOPALAMINE 1 MG/3D
1 PATCH, EXTENDED RELEASE TRANSDERMAL ONCE
Refills: 0 | Status: COMPLETED | OUTPATIENT
Start: 2024-02-01 | End: 2024-02-01

## 2024-02-01 RX ADMIN — Medication 30 MILLIGRAM(S): at 21:16

## 2024-02-01 RX ADMIN — AZITHROMYCIN 255 MILLIGRAM(S): 500 TABLET, FILM COATED ORAL at 20:42

## 2024-02-01 RX ADMIN — Medication 30 MILLILITER(S): at 18:58

## 2024-02-01 RX ADMIN — Medication 2000 MILLIGRAM(S): at 20:42

## 2024-02-01 RX ADMIN — FAMOTIDINE 20 MILLIGRAM(S): 10 INJECTION INTRAVENOUS at 18:50

## 2024-02-01 RX ADMIN — SODIUM CHLORIDE 125 MILLILITER(S): 9 INJECTION, SOLUTION INTRAVENOUS at 18:59

## 2024-02-01 RX ADMIN — Medication 1000 MILLIUNIT(S)/MIN: at 21:16

## 2024-02-01 RX ADMIN — SCOPALAMINE 1 PATCH: 1 PATCH, EXTENDED RELEASE TRANSDERMAL at 18:59

## 2024-02-01 RX ADMIN — TRANEXAMIC ACID 220 MILLIGRAM(S): 100 INJECTION, SOLUTION INTRAVENOUS at 19:46

## 2024-02-01 RX ADMIN — Medication 30 MILLIGRAM(S): at 21:45

## 2024-02-01 RX ADMIN — SODIUM CHLORIDE 2000 MILLILITER(S): 9 INJECTION, SOLUTION INTRAVENOUS at 18:00

## 2024-02-01 RX ADMIN — Medication 975 MILLIGRAM(S): at 19:00

## 2024-02-01 NOTE — OB PROVIDER TRIAGE NOTE - HISTORY OF PRESENT ILLNESS
34 yo  with pmh of asthma, 3 prior c-sections and 1 spontaneous miscarriage presented to [facility] L&D for abdominal pain that she feels are contractions and lower back pain. Patient also admits to increased swelling in her hands    Pt denies palpitation and cp.  Pt denies vaginal bleeding and leakage of fluid. She endorses good fetal movement.   Pt denies trauma.   Pt denies visual disturbances, and new-onset edema.   She denies any urinary complaints.     She denies fevers.  She denies shortness of breath, chest pain, and palpitations.    Pregnancy course is  uncomplicated.     PGYN: -fibroids/-cysts, denied STD hx, denies abnormal PAPs  PMH: asthma  PSH: 3 c-sections  SH: Denies tobacco use, EtOH use and illicit drug use during the pregnancy; Feels safe at home  Meds: prn albuterol, pnv  All: nkda

## 2024-02-01 NOTE — OB PROVIDER TRIAGE NOTE - NS_OBGYNHISTORY_OBGYN_ALL_OB_FT
followed by   [facility]  4 prior pregnancies  3 sections: 1st  due to fetal macrosomia at 10lb  1 spontaneous miscarriage; passed products of conception naturally  2x pre-term deliveries. Last one due to pre-eclampsia    No complications this pregnancy

## 2024-02-01 NOTE — OB PROVIDER DELIVERY SUMMARY - NSPROVIDERDELIVERYNOTE_OBGYN_ALL_OB_FT
32yo  presenting with labor, admitted for repeat CS.  Patient was taken to the OR, a repeat low transverse  section was performed and a viable ___ infant was delivered atraumatically in ___ presentation at ____, nuchal cord x___. Placenta delivered at ____. Hysterotomy, rectus muscles, fascia, subcutaneous and skin were reapproximated with suture____. Excellent hemostasis was obtained at each layer of closure.  Apgars___.  EBL ___. 34yo  presenting with labor, admitted for repeat CS.  Patient was taken to the OR, a repeat low transverse  section was performed and a viable female infant was delivered atraumatically in cephalic presentation at  with vacuum assistance, no nuchal cord. Placenta delivered at . Hysterotomy, fascia, subcutaneous and skin were reapproximated with suture. Surgicell was placed over the hysterotomy and right cystectomy as well as bilateral salpingectomy also performed. Excellent hemostasis was obtained at each layer of closure.  Apgars 9/9  EBL 533cc  Dictation#:[id number]

## 2024-02-01 NOTE — OB PROVIDER H&P - HISTORY OF PRESENT ILLNESS
34 yo  with pmh of asthma, 3 prior c-sections and 1 spontaneous miscarriage presented to [facility] L&D for abdominal pain that she feels are contractions and lower back pain. Patient also admits to increased swelling in her hands    Pt denies palpitation and cp.  Pt denies vaginal bleeding and leakage of fluid. She endorses good fetal movement.   Pt denies trauma.   Pt denies visual disturbances, and new-onset edema.   She denies any urinary complaints.     She denies fevers.  She denies shortness of breath, chest pain, and palpitations.    Pregnancy course is  uncomplicated.   POBhx: c/s 40w  for failure to descend, rCS  at 35 weeks for NRFHT, rCS   PGYN: -fibroids/-cysts, denied STD hx, denies abnormal PAPs  PMH: asthma  PSH: 3 c-sections  SH: Denies tobacco use, EtOH use and illicit drug use during the pregnancy; Feels safe at home  Meds: prn albuterol, pnv  All: nkda

## 2024-02-01 NOTE — OB PROVIDER H&P - NSHPPHYSICALEXAM_GEN_ALL_CORE
Vital Signs Last 24 Hrs  T(C): 36.8 (01 Feb 2024 15:29), Max: 36.8 (01 Feb 2024 15:29)  T(F): 98.24 (01 Feb 2024 15:29), Max: 98.24 (01 Feb 2024 15:29)  HR: 96 (01 Feb 2024 15:23) (96 - 96)  BP: 119/67 (01 Feb 2024 15:23) (119/67 - 119/67)  RR: 22 (01 Feb 2024 15:29) (22 - 22)    Gen: appear uncomfortable but not in acute distress  Head: dry mucus membranes  Heart: S1 S2, tachycardic  Lungs: CTAB, no nasal flaring or intracostal retractions or increased wob  Abd: soft, gravid  Ext: non-edematous, non-tender   FHT: 150 baseline, moderate variability, + accels, -decels  [place]: non- regular ctx

## 2024-02-01 NOTE — OB PROVIDER DELIVERY SUMMARY - NSSELHIDDEN_OBGYN_ALL_OB_FT
[NS_DeliveryAttending1_OBGYN_ALL_OB_FT:[id number]==],[NS_DeliveryRN_OBGYN_ALL_OB_FT:[id number]==],[NS_DeliveryAssist1_OBGYN_ALL_OB_FT:[id number]=]

## 2024-02-01 NOTE — OB PROVIDER TRIAGE NOTE - NSOBPROVIDERNOTE_OBGYN_ALL_OB_FT
A/P: 32 yo  with pmh of asthma, 3 prior c-sections and 1 spontaneous miscarriage presented to [facility] L&D for rule out labor, rule out preecclampsia    #Rule out labor  Patient with painful contractions  SVE: .5/0/-3; repeat exam one out later unchanged   FHR: 145 baseline, moderate variability, + accels, -decels  [place]: Irregular contractions noted    #Rule out PEC  Patient complaining of swelling  PIH labs WNL; P/C ratio 0.1  UA WNL    Dispo: Continue to observe.     Discussed with Dr. Rg

## 2024-02-01 NOTE — OB RN INTRAOPERATIVE NOTE - NSSELHIDDEN_OBGYN_ALL_OB_FT
[NS_DeliveryAttending1_OBGYN_ALL_OB_FT:[id number]==],[NS_DeliveryRN_OBGYN_ALL_OB_FT:[id number]==] [NS_DeliveryAttending1_OBGYN_ALL_OB_FT:[id number]==],[NS_DeliveryRN_OBGYN_ALL_OB_FT:[id number]==],[NS_DeliveryAssist1_OBGYN_ALL_OB_FT:[id number]=]

## 2024-02-01 NOTE — OB RN DELIVERY SUMMARY - NSSELHIDDEN_OBGYN_ALL_OB_FT
[NS_DeliveryAttending1_OBGYN_ALL_OB_FT:[id number]==],[NS_DeliveryRN_OBGYN_ALL_OB_FT:[id number]==] [NS_DeliveryAttending1_OBGYN_ALL_OB_FT:[id number]==],[NS_DeliveryRN_OBGYN_ALL_OB_FT:[id number]==],[NS_DeliveryAssist1_OBGYN_ALL_OB_FT:[id number]=]

## 2024-02-01 NOTE — OB PROVIDER H&P - ASSESSMENT
A/P: 32 yo  with pmh of asthma, 3 prior c-sections and 1 spontaneous miscarriage presented to [facility] L&D for rule out labor, rule out preecclampsia    #Rule out PEC  Patient complaining of swelling  PIH labs WNL; P/C ratio 0.1  UA WNL    #Rule out labor  Patient with painful contractions  SVE: .5/0/-3; repeat exam one out later unchanged   FHR: 145 baseline, moderate variability, + accels, -decels  [place]: Lori painfully q4-5 min  Will admit for repeat  due to increasing pain and consistent contractions  -Admit to L&D  -Consent  -Admission labs  -NPO, except ice chips   -IV fluids  -Analgesia: Spinal  -DVT ppx: Ambulate and SCD's while in bed       Discussed with Dr. Rg

## 2024-02-01 NOTE — OB RN TRIAGE NOTE - NSCTXPAIN_OBGYN_ALL_OB
ASSESSMENT:  63yF w/ PMHx of  as above who presented with nausea/vomiting/abdominal pain. Physical exam findings, imaging, and labs as documented above.     PLAN:  -Band port aspirated with no return of fluid as was empty  -No indication for surgical intervention currently, Bariatric surgery will continue to follow  -Admission to medical service for treatment of dehydration/pain control  -Recommend Upper GI series to evaluate contrast progression through band site. If contrast is shown to traverse band, may start clear liquid diet    Lines/Tubes: PIV    Above plan discussed with Attending Surgeon Dr. Oropeza, patient, and Primary team  06-09-22 @ 11:17 ASSESSMENT:  63yF w/ PMHx of  as above who presented with nausea/vomiting/abdominal pain/Watery diarrhea x 1 week. Physical exam findings, imaging, and labs as documented above.     PLAN:  -Band port aspirated with no return of fluid as was empty  -No indication for surgical intervention currently, Bariatric surgery will continue to follow  -Admission to medical service for treatment of dehydration/pain control  -Recommend Upper GI series to evaluate contrast progression through band site. If contrast is shown to traverse band, may start clear liquid diet    Lines/Tubes: PIV    Above plan discussed with Attending Surgeon Dr. Oropeza, patient, and Primary team  06-09-22 @ 11:17 Yes

## 2024-02-01 NOTE — OB RN PATIENT PROFILE - FALL HARM RISK - UNIVERSAL INTERVENTIONS
Bed in lowest position, wheels locked, appropriate side rails in place/Call bell, personal items and telephone in reach/Instruct patient to call for assistance before getting out of bed or chair/Non-slip footwear when patient is out of bed/[place] to call system/Physically safe environment - no spills, clutter or unnecessary equipment/Purposeful Proactive Rounding/Room/bathroom lighting operational, light cord in reach

## 2024-02-01 NOTE — OB PROVIDER H&P - NSLOWPPHRISK_OBGYN_A_OB
[place] Pregnancy/No known bleeding disorder/No history of postpartum hemorrhage/No other PPH risks indicated

## 2024-02-01 NOTE — OB PROVIDER TRIAGE NOTE - NSHPLABSRESULTS_GEN_ALL_CORE
13.0   10.68 )-----------( 328      ( 01 Feb 2024 15:40 )             38.7     02-01    136  |  101  |  5.7<L>  ----------------------------<  81  3.6   |  18.0<L>  |  0.41<L>    Ca    9.4      01 Feb 2024 15:40    TPro  6.5<L>  /  Alb  3.4  /  TBili  <0.2<L>  /  DBili  x   /  AST  19  /  ALT  19  /  AlkPhos  117  02-01

## 2024-02-01 NOTE — OB PROVIDER TRIAGE NOTE - PATIENT'S GENDER IDENTITY
9/23/2019    Chief Complaint   Patient presents with   • Medicare Wellness Visit     Tess Neely presents for Medicare Wellness Visit. She has no current complaints or concerns.     Patient Care Team:  Laurel Mcnally MD as PCP - General  Diego Ashton MD (Hematology & Oncology)  Cindy Em MD (Obstetrics & Gynecology)  Claire Gayle MD (Ophthalmology)    Patient Active Problem List    Diagnosis Date Noted   • Chronic insomnia 10/17/2017     Priority: Low   • Ulcerative Colitis      Priority: Low   • HLD (hyperlipidemia)      Priority: Low   • Osteopenia      Priority: Low   • Arthritis      Priority: Low   • Fibrocystic breast disease      Priority: Low   • Malignant neoplasm of breast (female), unspecified site      Priority: Low     Infiltrating ductal carcinoma, bilateral breasts  Extensive lobular ca in situ on left  S/p left mastectomy w/ reconstruction and right partial mastectomy with radiation therapy         Current Outpatient Medications   Medication Sig   • vitamin B-12 (CYANOCOBALAMIN) 1000 MCG tablet Take 1,000 mcg by mouth daily.   • cholecalciferol (VITAMIN D3) 1000 UNITS tablet Take 2,000 Units by mouth daily.   • simvastatin (ZOCOR) 40 MG tablet Take 1 tablet by mouth nightly.   • Melatonin 5 MG/ML Liquid    • zolpidem (AMBIEN) 5 MG tablet Take 1 tablet by mouth nightly as needed for Sleep.   • Turmeric Powder daily.   • latanoprost (XALATAN) 0.005 % ophthalmic solution Place 1 drop into left eye nightly.   • Multiple Vitamin (DAILY MULTIVITAMIN) OR TABS 1 TABLET DAILY     No current facility-administered medications for this visit.        ALLERGIES:   Allergen Reactions   • Codeine Nausea & Vomiting   • Penicillin G Nausea & Vomiting       Past Medical History:   Diagnosis Date   • Arthritis    • Fibrocystic breast disease    • HLD (hyperlipidemia)    • Malignant neoplasm of breast (female), unspecified site    • Osteopenia    • Ulcerative Colitis        Past Surgical History:    Procedure Laterality Date   • Adenoidectomy w/ myringotomy and tubes     • Breast lumpectomy      Right   • Colectomy      w/ileostomy   • Mastectomy      Left breast cancer   • Remove tonsils/adenoids,<11 y/o      T & A   • Tonsillectomy and adenoidectomy         Family History   Problem Relation Age of Onset   • Cancer Father         gastric   • Lung Disease Father    • Tuberculosis Father         INACTIVE   • Diabetes Sister         type 2   • Cancer Brother         leukemia   • Asthma Daughter    • Cancer Brother         lung   • Congestive Heart Failure Brother         chf   • Cancer Daughter         breast/ hodgkins   • Heart disease Paternal Aunt    • Heart disease Paternal Uncle        Social History     Socioeconomic History   • Marital status: /Civil Union     Spouse name: Not on file   • Number of children: Not on file   • Years of education: Not on file   • Highest education level: Not on file   Occupational History   • Not on file   Social Needs   • Financial resource strain: Not on file   • Food insecurity:     Worry: Not on file     Inability: Not on file   • Transportation needs:     Medical: Not on file     Non-medical: Not on file   Tobacco Use   • Smoking status: Former Smoker     Packs/day: 1.00     Years: 10.00     Pack years: 10.00     Types: Cigarettes     Last attempt to quit: 3/1/1972     Years since quittin.5   • Smokeless tobacco: Never Used   Substance and Sexual Activity   • Alcohol use: Yes     Alcohol/week: 8.3 standard drinks     Types: 10 Standard drinks or equivalent per week     Frequency: 4 or more times a week     Drinks per session: 1 or 2     Binge frequency: Never   • Drug use: No   • Sexual activity: Not on file   Lifestyle   • Physical activity:     Days per week: Not on file     Minutes per session: Not on file   • Stress: Not on file   Relationships   • Social connections:     Talks on phone: Not on file     Gets together: Not on file     Attends Confucianist  service: Not on file     Active member of club or organization: Not on file     Attends meetings of clubs or organizations: Not on file     Relationship status: Not on file   • Intimate partner violence:     Fear of current or ex partner: Not on file     Emotionally abused: Not on file     Physically abused: Not on file     Forced sexual activity: Not on file   Other Topics Concern   • Not on file   Social History Narrative   • Not on file       Visit Vitals  /68   Pulse 65   Temp 97.8 °F (36.6 °C)   Resp 12   Ht 5' 3\" (1.6 m)   Wt 59 kg   BMI 23.03 kg/m²       Diet:  Reviewed with the patient and reveals: watches diet .    Physical Activities:  moderate regular exercise program.walking 30 to 40 mins on treadmill 5 days a week    Patient Questionaire:  6 b.) How many servings of High Fiber / Whole Grain Foods to you have each day ( 1 serving = 1 cup cold cereal, 1/2 cup cooked cereal, 1 slice bread):  1 per day         Vision and hearing screens documented:  No exam data present  Advance Directive: has an advance directive - a copy has been provided  Cognitive Assessment: no evidence of cognitive dysfunction by direct observation      Needed Screening/Treatment:   Immunizations reviewed and patient needs: Influenza and Herpes Zoster  Needed follow up:  Flu shot in [place] end of October 2019     Patient had ulcerative colitis and has a total colectomy in 1978 needing her to have a ileostomy that requires ongoing care             TIME/TODAY'S DATE: 7:49 AM, 9/23/2019     Female

## 2024-02-01 NOTE — OB RN DELIVERY SUMMARY - BABY A: ID BAND NUMBER, DELIVERY
[facility]  Cardiology  Discharge Summary      Patient Name: Sachin Calloway  MRN: [id number]  Admission Date: 6/10/2019  Hospital Length of Stay: 13 days  Discharge Date and Time:  6/25/2019  Attending Physician: No att. providers found  Discharging Provider: MAURICE French, ANP  Primary Care Physician: Juan Gatica MD    HPI: 57 y/o male with hx of alcoholic liver cirrhosis, HTN, former EtOH abuse, active tobacco use, PAD who presents for peripheral angiogram. Has had worsening right dorsal foot ulcer with necrotic appearing tissue and toes. LE arterial doppler with PAD. Denies CP, orthopnea, PND, syncope. Does not ambulate much and received paracentesis recently    Procedure(s) (LRB):  EMBOLIZATION, BLOOD VESSEL (N/A)     Indwelling Lines/Drains at time of discharge:  Lines/Drains/Airways     Drain                 Urethral Catheter 06/17/19 1414 Straight-tip 16 Fr. 14 days          Pressure Ulcer                 Pressure Injury 06/15/19 1930 midline Coccyx #1 Stage 2 15 days         Pressure Injury 06/16/19 2056 Left Hip #2 Stage 2 14 days                Hospital Course     6/10/2019 Admitted for elective LE angiogram due to nonhealing wound to right foot. Taken to cath lab for the procedure via right radial access with following results:     · Severe bilateral PAD with CLI of right foot  · On the right there is an ostial SFA  which is calcified and reconstitutes via profunda collaterals distally. There is single vessel runoff via peroneal which has a severe proximal stenosis and distally supplies collaterals to a plantar arch. No identifiable DP, although there is a proximal portion of AT  · On the left there is a severe stenosis at proximal SFA with mid SFA  with reconstitution distally. There is two vessel runoff via AT into a DP and peroneal which supplies collaterals to a plantar arch  · Staged intervention of LSFA and peroneal first. Will then consider staged intervention of  RATA and if unsuccessful will consider AV flow reversal     6/11/2019 NPO for RLE intervention today   6/12/2019 Taken to cath lab yesterday for intervention via LCFA access with following results:     · LCFA antegrade access with crossover sheath used  · Successful RSFA  intervention with atherectomy followed by IVUS guided PTA with 4.0 balloon in POP and mid to distal SFA and 5.0 balloon in ostial and proximal SFA. This was followed by PTA with DCB to POP/mid and distal SFA with 4.0 balloon and 5.0 balloon ostial/mid SFA all with excellent results  · Successful PTA to proximal peroneal with 3.0 x 60 balloon with excellent result  · Will likely need staged intervention of AT/DP and if unsuccessful, AV flow reversal  · Plavix/ASA/statin  · Continue aggressive wound care and management per Podiatry     Tolerated procedure well and recovered on telemetry unit. LCFA access site with no active bleeding, hematoma or ecchymosis. Podiatry consulted with plans for surgery once demarcation occurs. ID consulted with recs for blood cultures along with empiric antibiotics with IV Vanc and Zosyn   6/13/2019 H&H 7.7 & 24.3 with WBC up to 16.11 from 15.17. BMP with unchanged creatinine. Blood cultures with prelim results NGTD. HR and BP stable. Dietian consulted with recs noted-will liberalize diet and order supplements. Remains on IV Vanc and Zosyn for now. PT/OT on board   6/14/2019 WBC down slightly to 15.4 this AM. H&H down slightly to 7.1&22.8. BP stable. Episode of ST with HR up to 140s this AM nonsustained and patient asymptomatic. ASA discontinued. Will consult GI due to downtrending H&H   6/15/2019 PAD with CLI requiring intervention and likely surgery. Continue medical management. No intervention planned until patient more stable. Continue antibiotics for gangrene and possible Osteo-blood cultures with NGTD. ID and podiatry following. Anemia. GI onboard and plan EGD/Colonscopy Monday. hgb 7.3 today. Transfuse if  <7.   6/16/2019 Remains on IV abx currently. MRI with no evidence of osteomyelitis. ID and podiatry following. H&H down to 6.9 & 22.0 will transfuse with 2units PRBCs. Plan for EGD/Colonscopy Monday 6/17/2019 WBCs up to 18K this AM. H&H improved to 10.4&32.0 this AM. BMP stable. NPO for EGD/colonoscopy today   6/18/2019 PEA arrest yesterday post EGD with ROSC after CPR and a few rounds of CPR. Given additional 2 units of PRBCs in Endoscopy given findings on EGD and based on discussion with GI. Intubated per anesthesia and admitted to ICU with SBP 120s-130s upon arrival. Post admission more alert and pulling at ETT therefore placed on Precedex drip. BP trended down to 90s with initiation of Neosynephrine drip. Lactic acid 4.5 and repeat H&H 15.4&46.0 likely erroneous immediately after PRBC. Serial H&H overnight with H&H 12.5&36.1 and down to 10.2&30.3 this AM. Remains intubated-Pulmonary consulted. GI on board with plans for CT of abdomen with IR/TIPS procedure today by IR. onversation with wife per IR in regards to procedure with high risk for hemodynamic instability during the procedure although no other alternative currently and family with desire for aggressive treatment.   6/19/2019 Remains intubated on Precedex and Aaron. IR procedure yesterday with DIPS. GI note recommend surgical evaluation. Additional PRBCs yesterday. H&H this MA 11.2&33.1 with slight trend down to 10.7&31.4 later in morning. HR and BP stable on pressors. K+ 3.6 BUN 9 creatinine .8 Mg 1.7  6/20/2019 Remains intubated and sedated. On Aaron with stable BP currently-wean in process. H&H 10.9&32.8 this AM and stable over the past 24 hours-will change H&Hs to Q12hrs. BMP with K+ 3.4 BUN 10 creatinine .7  down from 383  down from 206 albumin 1.4. Negative 6.9 liters since admission. General surgery (Dr. Kirk) consulted for surgical evaluation for duodenal varix and deemed not to be a surgical candidate with recs for second opinion by  Dr. BRENNON Kimball-consult placed. Pulmonary on board with evaluation for extubation in process   6/21/2019 Extubated yesterday and stable for respiratory standpoint overnight. Remains on IV Aaron with wean in process-down to .5mcg/kg/min from 1.25mcg/kg/min yesterday. Serial H&H in process with decrease in H&H this morning to 8.2&25.4 from 10.0&31.4 yesterday. BMP WNL. AST and ALT trending down. IR reconsulted for evaluation of repeat procedure for duodenal varix. Will consult [facility] Medicine for assistance with management of multiple medical issues   6/22/2019 transfused 1 u 6/21/019, H/H stable, IM/palliative care service had long discussion regarding his overall prognosis. Family meeting will take place today. Continue with serial H/H. Abx per ID. No longer requiring pressors. Palliative paracentesis if needed.   6/23/2019: patient agreed to DNR status. Transfer to floor then DC home with comfort care  6/24/2019 Discussion with wife and patient in regards to goals of care with plans for discharge home with hospice. Case management and  consulted for assistance and arrangement  6/25/2019 H&H trended down overnight and BP marginal this morning. Ongoing conversation with patient, wife and Palliative medicine with plans to discharge home with hospice. Appropriate orders written and home hospice arranged per Case management and        Consults:   Consults (From admission, onward)        Status Ordering Provider     Inpatient consult to Gastroenterology[facility]  Once     Provider:  Bill Arias MD    Completed CHARLES VALLES     Inpatient consult to General Surgery  Once     Provider:  KEON Kimball MD    Completed HAROON MIGUEL     Inpatient consult to Hospital Medicine[facility]  Once     Provider:  Isidra Crane MD    Completed CHARLES VALLES     Inpatient consult to Infectious Diseases  Once     Provider:  (Not yet assigned)    Completed CHARLES VALLES      Inpatient consult to Interventional Radiology  Once     Provider:  (Not yet assigned)    Completed STEW PERRIN     Inpatient consult to Interventional Radiology  Once     Provider:  (Not yet assigned)    Completed CHARLES VALLES     Inpatient consult to Palliative Care  Once     Provider:  (Not yet assigned)    Completed AMI MOREL     Inpatient consult to Podiatry  Once     Provider:  (Not yet assigned)    Completed SPENCER CLARK     Inpatient consult to Pulmonology  Once     Provider:  (Not yet assigned)    Completed CHARLES VALLES     Inpatient consult to Registered Dietitian/Nutritionist  Once     Provider:  (Not yet assigned)    Completed CHARLES VALLES     Inpatient consult to Registered Dietitian/Nutritionist  Once     Provider:  (Not yet assigned)    Completed CHARLES VALLES     Inpatient consult to Urology  Once     Provider:  Bety Severino MD    Completed HUGO HAMM            Pending Diagnostic Studies:     None          Final Active Diagnoses:    Diagnosis Date Noted POA    PRINCIPAL PROBLEM:  Critical lower limb ischemia [I99.8] 06/10/2019 Yes    Palliative care encounter [Z51.5] 06/24/2019 Not Applicable    Goals of care, counseling/discussion [Z71.89] 06/24/2019 Not Applicable    Counseling regarding advanced care planning and goals of care [Z71.89] 06/24/2019 Not Applicable    Hypokalemia [E87.6] 06/22/2019 No    Debility [R53.81] 06/21/2019 Yes    Gastrointestinal hemorrhage [K92.2]  Yes    Lactic acidosis [E87.2] 06/18/2019 Yes    Leukocytosis [D72.829] 06/18/2019 Yes    Hypocalcemia [E83.51] 06/18/2019 Yes    Cardiac arrest [I46.9] 06/17/2019 Yes    Severe protein-calorie malnutrition [E43] 06/12/2019 Yes    Hypoalbuminemia [E88.09] 06/12/2019 Yes    Gangrene of right foot [I96] 06/11/2019 Yes    Sacral decubitus ulcer [L89.159] 06/11/2019 Yes    Pressure ulcer of left hip, stage 3 [L89.223] 06/11/2019 Yes    Anasarca [R60.1] 05/14/2019 Yes     Alcoholic cirrhosis of liver with ascites [K70.31] 05/02/2019 Yes    Anemia [D64.9] 05/02/2019 Yes      Problems Resolved During this Admission:       Discharged Condition: poor    Follow Up:  Follow-up Information     [facility].    Specialties:  Hospice and Palliative Medicine, Hospice Services  Why:  Hospice  Contact information:  [address]  [address]  [place] [address]  [phone]                 Patient Instructions:   No discharge procedures on file.  Medications:  Reconciled Home Medications:      Medication List      START taking these medications    oxyCODONE 5 MG immediate release tablet  Commonly known as:  ROXICODONE  Take 1 tablet (5 mg total) by mouth every 4 (four) hours as needed.        CONTINUE taking these medications    furosemide 80 MG tablet  Commonly known as:  LASIX  Take 1 tablet (80 mg total) by mouth once daily.     pantoprazole 40 MG tablet  Commonly known as:  PROTONIX  Take 1 tablet (40 mg total) by mouth once daily.     potassium chloride SA 20 MEQ tablet  Commonly known as:  K-DUR,KLOR-CON  Take 1 tablet (20 mEq total) by mouth once daily.        STOP taking these medications    acetaminophen-codeine 300-30mg 300-30 mg Tab  Commonly known as:  TYLENOL #3     spironolactone 50 MG tablet  Commonly known as:  ALDACTONE            Time spent on the discharge of patient: 45 minutes    MAURICE French, ANP  Cardiology  [facility]   [id number] FYD

## 2024-02-01 NOTE — OB PROVIDER H&P - NS_OBGYNHISTORY_OBGYN_ALL_OB_FT
followed by   [facility]  4 prior pregnancies  3 sections: 1st  due to fetal macrosomia at 10lb  1 spontaneous miscarriage; passed products of conception naturally  2x pre-term deliveries. Last one due to pre-eclampsia    No complications this pregnancy

## 2024-02-01 NOTE — OB RN DELIVERY SUMMARY - NS_SEPSISRSKCALC_OBGYN_ALL_OB_FT
EOS calculated successfully. EOS Risk Factor: 0.5/1000 live births ([facility] national incidence); GA=35w2d; Temp=98.4; ROM=0.033; GBS='Unknown'; Antibiotics='No antibiotics or any antibiotics < 2 hrs prior to birth'

## 2024-02-01 NOTE — OB RN PATIENT PROFILE - NS_OBGYNHISTORY_OBGYN_ALL_OB_FT
followed by   [facility]  4 prior pregnancies  3 sections: 1st  due to fetal macrosomia at 10lb  1 spontaneous miscarriage; passed products of conception naturally  2x pre-term deliveries. Last one due to pre-eclampsia    No complications this pregnancy

## 2024-02-01 NOTE — OB PROVIDER TRIAGE NOTE - NSHPPHYSICALEXAM_GEN_ALL_CORE
Vital Signs Last 24 Hrs  T(C): 36.8 (01 Feb 2024 15:29), Max: 36.8 (01 Feb 2024 15:29)  T(F): 98.24 (01 Feb 2024 15:29), Max: 98.24 (01 Feb 2024 15:29)  HR: 96 (01 Feb 2024 15:23) (96 - 96)  BP: 119/67 (01 Feb 2024 15:23) (119/67 - 119/67)  RR: 22 (01 Feb 2024 15:29) (22 - 22)    Gen: appear uncomfortable but not in acute distress  Head: dry mucus membranes  Heart: S1 S2, tachycardic  Lungs: CTAB, no nasal flaring or intracostal retractions or increased wob  Abd: soft, gravid  Ext: non-edematous, non-tender   FHT: 150 baseline, moderate variability, + accels, -decels  Limon: non- regular ctx

## 2024-02-01 NOTE — OB PROVIDER H&P - ATTENDING COMMENTS
No signs of PEC  pt noted to have painful contractions   patient admitted for c section   pt received Iv fluids and contractions continue to get worse  hx of athma  previous 3 c sec, last c sec op report obtained. adhesions reported in the op report  plan   cross match 2 u PRBC  DVT ppx   NPO   anesthesia consult   pt signed BTL papers

## 2024-02-01 NOTE — OB RN TRIAGE NOTE - FALL HARM RISK - UNIVERSAL INTERVENTIONS
Bed in lowest position, wheels locked, appropriate side rails in place/Call bell, personal items and telephone in reach/Instruct patient to call for assistance before getting out of bed or chair/Non-slip footwear when patient is out of bed/[place] to call system/Physically safe environment - no spills, clutter or unnecessary equipment/Purposeful Proactive Rounding/Room/bathroom lighting operational, light cord in reach

## 2024-02-02 LAB
BASOPHILS # BLD AUTO: 0.04 K/UL — SIGNIFICANT CHANGE UP (ref 0–0.2)
BASOPHILS NFR BLD AUTO: 0.2 % — SIGNIFICANT CHANGE UP (ref 0–2)
EOSINOPHIL # BLD AUTO: 0 K/UL — SIGNIFICANT CHANGE UP (ref 0–0.5)
EOSINOPHIL NFR BLD AUTO: 0 % — SIGNIFICANT CHANGE UP (ref 0–6)
HCT VFR BLD CALC: 37 % — SIGNIFICANT CHANGE UP (ref 34.5–45)
HGB BLD-MCNC: 12.4 G/DL — SIGNIFICANT CHANGE UP (ref 11.5–15.5)
HIV 1 & 2 AB SERPL IA.RAPID: SIGNIFICANT CHANGE UP
HIV 1+2 AB+HIV1 P24 AG SERPL QL IA: SIGNIFICANT CHANGE UP
IMM GRANULOCYTES NFR BLD AUTO: 0.8 % — SIGNIFICANT CHANGE UP (ref 0–0.9)
LYMPHOCYTES # BLD AUTO: 1.59 K/UL — SIGNIFICANT CHANGE UP (ref 1–3.3)
LYMPHOCYTES # BLD AUTO: 9.7 % — LOW (ref 13–44)
MCHC RBC-ENTMCNC: 29.8 PG — SIGNIFICANT CHANGE UP (ref 27–34)
MCHC RBC-ENTMCNC: 33.5 GM/DL — SIGNIFICANT CHANGE UP (ref 32–36)
MCV RBC AUTO: 88.9 FL — SIGNIFICANT CHANGE UP (ref 80–100)
MONOCYTES # BLD AUTO: 0.98 K/UL — HIGH (ref 0–0.9)
MONOCYTES NFR BLD AUTO: 6 % — SIGNIFICANT CHANGE UP (ref 2–14)
NEUTROPHILS # BLD AUTO: 13.68 K/UL — HIGH (ref 1.8–7.4)
NEUTROPHILS NFR BLD AUTO: 83.3 % — HIGH (ref 43–77)
PLATELET # BLD AUTO: 304 K/UL — SIGNIFICANT CHANGE UP (ref 150–400)
RBC # BLD: 4.16 M/UL — SIGNIFICANT CHANGE UP (ref 3.8–5.2)
RBC # FLD: 13.1 % — SIGNIFICANT CHANGE UP (ref 10.3–14.5)
T PALLIDUM AB TITR SER: NEGATIVE — SIGNIFICANT CHANGE UP
WBC # BLD: 16.42 K/UL — HIGH (ref 3.8–10.5)
WBC # FLD AUTO: 16.42 K/UL — HIGH (ref 3.8–10.5)

## 2024-02-02 RX ORDER — ONDANSETRON 8 MG/1
4 TABLET, FILM COATED ORAL ONCE
Refills: 0 | Status: COMPLETED | OUTPATIENT
Start: 2024-02-02 | End: 2024-02-02

## 2024-02-02 RX ORDER — IBUPROFEN 200 MG
1 TABLET ORAL
Qty: 28 | Refills: 0
Start: 2024-02-02 | End: 2024-02-08

## 2024-02-02 RX ORDER — ASPIRIN/CALCIUM CARB/MAGNESIUM 324 MG
1 TABLET ORAL
Refills: 0 | DISCHARGE

## 2024-02-02 RX ORDER — ACETAMINOPHEN 500 MG
3 TABLET ORAL
Qty: 84 | Refills: 0
Start: 2024-02-02 | End: 2024-02-08

## 2024-02-02 RX ORDER — IBUPROFEN 200 MG
600 TABLET ORAL EVERY 6 HOURS
Refills: 0 | Status: DISCONTINUED | OUTPATIENT
Start: 2024-02-02 | End: 2024-02-04

## 2024-02-02 RX ADMIN — ONDANSETRON 4 MILLIGRAM(S): 8 TABLET, FILM COATED ORAL at 08:57

## 2024-02-02 RX ADMIN — Medication 30 MILLIGRAM(S): at 12:57

## 2024-02-02 RX ADMIN — Medication 30 MILLIGRAM(S): at 18:17

## 2024-02-02 RX ADMIN — Medication 600 MILLIGRAM(S): at 23:24

## 2024-02-02 RX ADMIN — Medication 1000 MILLIUNIT(S)/MIN: at 01:30

## 2024-02-02 RX ADMIN — Medication 975 MILLIGRAM(S): at 15:49

## 2024-02-02 RX ADMIN — Medication 30 MILLIGRAM(S): at 05:22

## 2024-02-02 RX ADMIN — ONDANSETRON 4 MILLIGRAM(S): 8 TABLET, FILM COATED ORAL at 02:25

## 2024-02-02 RX ADMIN — Medication 975 MILLIGRAM(S): at 20:53

## 2024-02-02 NOTE — DISCHARGE NOTE OB - NPI NUMBER (FOR SYSADMIN USE ONLY) :
-Keep dressings clean and dry. Change dressings if they become over saturated, soiled or fall off.     -Avoid prolonged standing or sitting without elevating your legs.    -Remove your compression garments if you have severe pain, severe swelling, numbness, color change, or temperature change in your toes. If you need to remove your compression garments, do so by unrolling them. Do not cut the compression garments off, this is to prevent cutting yourself on accident.    -Should you experience any significant changes in your wound(s), such as signs of infection (redness, swelling, localized heat, increased pain, fever > 101 F, chills) or have any questions regarding your home care instructions, please contact the wound center at [phone]. If after hours, contact your primary care physician or go to the hospital emergency room.            [[phone]]

## 2024-02-02 NOTE — PROGRESS NOTE ADULT - ATTENDING COMMENTS
POD1   no complaints  Hb 12.4  abd soft, ND, NT, incision intact , steristrip in place  ext no edema  plan   routine post op care  DVT ppx

## 2024-02-02 NOTE — DISCHARGE NOTE OB - PATIENT PORTAL LINK FT
You can access the FollowMyHealth Patient Portal offered by [facility] by registering at the following website: [email or web address]. By joining [employer]’s FollowMyHealth portal, you will also be able to view your health information using other applications (apps) compatible with our system.

## 2024-02-02 NOTE — DISCHARGE NOTE OB - CARE PLAN
Principal Discharge DX:	 delivery delivered  Assessment and plan of treatment:	1) Please take ibuprofen and/or Tylenol as needed for pain as prescribed.  2) Nothing in the vagina for 6 weeks (including no sex, no tampons, and no douching).  3) Please call your doctor for a follow up your postpartum appointment in 1-2 weeks.  4) Please continue taking vitamins postpartum.   5) Please call the office sooner if you have heavy vaginal bleeding, severe abdominal pain, or fever > 100.4F.  6) You may resume regular daily activity as tolerated  Secondary Diagnosis:	Status post bilateral salpingectomy  Assessment and plan of treatment:	Patient underwent intrapartum bilateral salpingectomy for surgical sterilization. Patient tolerated the procedure and is recovering well.   1

## 2024-02-02 NOTE — DISCHARGE NOTE OB - PLAN OF CARE
Patient underwent intrapartum bilateral salpingectomy for surgical sterilization. Patient tolerated the procedure and is recovering well. 1) Please take ibuprofen and/or Tylenol as needed for pain as prescribed.  2) Nothing in the vagina for 6 weeks (including no sex, no tampons, and no douching).  3) Please call your doctor for a follow up your postpartum appointment in 1-2 weeks.  4) Please continue taking vitamins postpartum.   5) Please call the office sooner if you have heavy vaginal bleeding, severe abdominal pain, or fever > 100.4F.  6) You may resume regular daily activity as tolerated

## 2024-02-02 NOTE — DISCHARGE NOTE OB - NS MD DC FALL RISK RISK
For information on Fall & Injury Prevention, visit: [email or web address] OR  [email or web address] OR  https://www.cdc.gov/steadi/patient.html

## 2024-02-02 NOTE — DISCHARGE NOTE OB - CARE PROVIDER_API CALL
Lizeth Strickland  Obstetrics and Gynecology  [facility] [place] [address]  Phone: [phone]  Fax: [phone]  Established Patient  Follow Up Time: 2 weeks

## 2024-02-02 NOTE — PROGRESS NOTE ADULT - ASSESSMENT
ASSESSMENT:  ADRIAN OHARA is a 33y yo  POD1 s/p rCS+BS, PRANAV, right ovarian cystectomy due to labor in setting of prior CS. s/p TXA  Patient has no complaints at this time, is otherwise clinically and hemodynamically stable.   girl is with patient at bedside    #CS Postpartum   - Continue routine post-operative and post-partum care  - Hgb 13.3 > AM CBC pending  - Rub: I/I  - TOV pending  - Continue with current pain management  - Encouraged maternal- bonding  - Encouraged ambulation and use of incentive spirometer    - Diet: Regular, advance as tolerated  - DVT ppx: SCDs and subQ Lovenox  - Dispo: Home POD2 per Attending's approval        ASSESSMENT:  ADRIAN OHARA is a 33y yo  POD1 s/p rCS+BS, PRANAV, right ovarian cystectomy due to labor in setting of prior CS. s/p TXA  Patient has no complaints at this time, is otherwise clinically and hemodynamically stable.   girl is with patient at bedside    #CS Postpartum   - Continue routine post-operative and post-partum care  - Hgb 13.3 > AM CBC pending  - Rub: I/I  - TOV pending  - Continue with current pain management  - Encouraged maternal- bonding  - Encouraged ambulation and use of incentive spirometer  - Zofran for nausea    - Diet: Regular, advance as tolerated  - DVT ppx: SCDs and subQ Lovenox  - Dispo: Home POD2 per Attending's approval

## 2024-02-02 NOTE — DISCHARGE NOTE OB - NS AS DC AMI YN
The patient ASA 3 with a class II Mallampati airway.  The plans for this patient is for moderate sedation benzodiazepine namely Versed opioid namely fentanyl.  The patient's history and physical has been documented and reviewed.  The patient is suitable to undertake sedation.  The risks benefits as well as alternatives have been discussed with the patient/decision-maker.             Electronically Signed On 05.06.2019 14:53  ___________________________________________________   Venecia Garay MD    
no

## 2024-02-02 NOTE — PROGRESS NOTE ADULT - SUBJECTIVE AND OBJECTIVE BOX
INTERVAL HPI/OVERNIGHT EVENTS:  33y Female s/p c section under combined spinal epidural anesthesia with duramorph for post op analgesia on 2/1/24    Vital Signs Last 24 Hrs  T(C): 37 (02 Feb 2024 07:42), Max: 37 (02 Feb 2024 07:42)  T(F): 98.6 (02 Feb 2024 07:42), Max: 98.6 (02 Feb 2024 07:42)  HR: 70 (02 Feb 2024 07:42) (67 - 97)  BP: 94/57 (02 Feb 2024 07:42) (90/53 - 136/54)  BP(mean): --  RR: 16 (02 Feb 2024 04:37) (16 - 22)  SpO2: 97% (02 Feb 2024 07:42) (92% - 100%)    Parameters below as of 02 Feb 2024 07:42  Patient On (Oxygen Delivery Method): room air            Patient's overall anesthesia satisfaction: Positive    Patients pain is well controlled with IT duramorph    No respiratory events overnight    No pruritis at this time    Patient doing well     No headache      No residual numbness or weakness, sensory and motor function intact.    No anesthetic complications or complaints noted or reported          .

## 2024-02-02 NOTE — DISCHARGE NOTE OB - MEDICATION SUMMARY - MEDICATIONS TO TAKE
I will START or STAY ON the medications listed below when I get home from the hospital:    ibuprofen 600 mg oral tablet  -- 1 tab(s) by mouth every 6 hours  -- Do not take this drug if you are pregnant.  It is very important that you take or use this exactly as directed.  Do not skip doses or discontinue unless directed by your doctor.  May cause drowsiness or dizziness.  Obtain medical advice before taking any non-prescription drugs as some may affect the action of this medication.  Take with food or milk.  -- Indication: For pain    acetaminophen 325 mg oral tablet  -- 3 tab(s) by mouth every 6 hours  -- Indication: For pain   I will START or STAY ON the medications listed below when I get home from the hospital:    ibuprofen 600 mg oral tablet  -- 1 tab(s) by mouth every 6 hours  -- Do not take this drug if you are pregnant.  It is very important that you take or use this exactly as directed.  Do not skip doses or discontinue unless directed by your doctor.  May cause drowsiness or dizziness.  Obtain medical advice before taking any non-prescription drugs as some may affect the action of this medication.  Take with food or milk.  -- Indication: For pain    acetaminophen 325 mg oral tablet  -- 3 tab(s) by mouth every 6 hours  -- Indication: For pain    oxyCODONE 5 mg oral tablet  -- 1 tab(s) by mouth every 8 hours MDD: 3  -- Indication: For Severe pain

## 2024-02-02 NOTE — DISCHARGE NOTE OB - HOSPITAL COURSE
rCS+BS@35w2d, painful contractions, PRANAV, right ovarian cystectomy, TXA, PIH labs wnl. Postpartum pain is well controlled with PRN medication. She has no difficulty with ambulation, voiding or PO intake. Lab values and vital signs are within normal limits prior to discharge.

## 2024-02-02 NOTE — DISCHARGE NOTE OB - CARE PROVIDERS DIRECT ADDRESSES
[email or web address]

## 2024-02-02 NOTE — OB NEONATOLOGY/PEDIATRICIAN DELIVERY SUMMARY - NSPEDSNEONOTESA_OBGYN_ALL_OB_FT
called to attend the delivery of a 33 years old at 35+2 weeks of gestation. Her serologies are negative for HIV/Hep B/RPR. Rubella and rubeola immune. GBS UK, O positive antibody neg. AROM at . Baby vigorous at birth, no DCC  as placenta was . exam unremarkable.  To mom.

## 2024-02-02 NOTE — PROGRESS NOTE ADULT - SUBJECTIVE AND OBJECTIVE BOX
SUBJECTIVE:  Patient seen and examined this morning at bedside. No acute events overnight.     OBJECTIVE:  Physical exam:  General: AOx3, NAD.  Heart: S1/S2 with regular rate and normal rhythm.  Lungs: CTABL, no crackles or wheezing.   Abdomen: +BS, Soft, appropriately tender, nondistended, no guarding or rebound tenderness, firm uterine fundus at umbilicus  Incision: clean dry and intact with steris. No erythema or discharge.  Ext: BL LE reveal minimal swelling, no popliteal tenderness or skin changes.     Vital Signs Last 24 Hrs  T(C): 36.9 (02 Feb 2024 04:37), Max: 36.9 (01 Feb 2024 17:59)  T(F): 98.5 (02 Feb 2024 04:37), Max: 98.5 (02 Feb 2024 04:37)  HR: 74 (02 Feb 2024 04:37) (67 - 97)  BP: 105/61 (02 Feb 2024 04:37) (90/53 - 136/54)    02-01-24 @ 07:01  -  02-02-24 @ 05:55  --------------------------------------------------------  IN: 2600 mL / OUT: 1478 mL / NET: 1122 mL        LABS:                        13.3   10.85 )-----------( 342      ( 01 Feb 2024 18:51 )             38.9        SUBJECTIVE:  Patient seen and examined this morning at bedside. No acute events overnight. Patient pending TOV. Reports not being able to eat due to nausea. Otherwise pain is well controlled.    OBJECTIVE:  Physical exam:  General: AOx3, NAD.  Heart: S1/S2 with regular rate and normal rhythm.  Lungs: CTABL, no crackles or wheezing.   Abdomen: +BS, Soft, appropriately tender, nondistended, no guarding or rebound tenderness, firm uterine fundus at umbilicus  Incision: clean dry and intact with steris. No erythema or discharge.  Ext: BL LE reveal minimal swelling, no popliteal tenderness or skin changes.     Vital Signs Last 24 Hrs  T(C): 36.9 (02 Feb 2024 04:37), Max: 36.9 (01 Feb 2024 17:59)  T(F): 98.5 (02 Feb 2024 04:37), Max: 98.5 (02 Feb 2024 04:37)  HR: 74 (02 Feb 2024 04:37) (67 - 97)  BP: 105/61 (02 Feb 2024 04:37) (90/53 - 136/54)    02-01-24 @ 07:01  -  02-02-24 @ 05:55  --------------------------------------------------------  IN: 2600 mL / OUT: 1478 mL / NET: 1122 mL        LABS:                        13.3   10.85 )-----------( 342      ( 01 Feb 2024 18:51 )             38.9

## 2024-02-03 LAB
ALBUMIN SERPL ELPH-MCNC: 3.2 G/DL — LOW (ref 3.3–5.2)
ALP SERPL-CCNC: 100 U/L — SIGNIFICANT CHANGE UP (ref 40–120)
ALT FLD-CCNC: 17 U/L — SIGNIFICANT CHANGE UP
ANION GAP SERPL CALC-SCNC: 15 MMOL/L — SIGNIFICANT CHANGE UP (ref 5–17)
APTT BLD: 30.1 SEC — SIGNIFICANT CHANGE UP (ref 24.5–35.6)
AST SERPL-CCNC: 22 U/L — SIGNIFICANT CHANGE UP
BASOPHILS # BLD AUTO: 0.05 K/UL — SIGNIFICANT CHANGE UP (ref 0–0.2)
BASOPHILS NFR BLD AUTO: 0.3 % — SIGNIFICANT CHANGE UP (ref 0–2)
BILIRUB SERPL-MCNC: <0.2 MG/DL — LOW (ref 0.4–2)
BUN SERPL-MCNC: 5.3 MG/DL — LOW (ref 8–20)
CALCIUM SERPL-MCNC: 9 MG/DL — SIGNIFICANT CHANGE UP (ref 8.4–10.5)
CHLORIDE SERPL-SCNC: 102 MMOL/L — SIGNIFICANT CHANGE UP (ref 96–108)
CO2 SERPL-SCNC: 21 MMOL/L — LOW (ref 22–29)
CREAT SERPL-MCNC: 0.46 MG/DL — LOW (ref 0.5–1.3)
EGFR: 129 ML/MIN/1.73M2 — SIGNIFICANT CHANGE UP
EOSINOPHIL # BLD AUTO: 0.09 K/UL — SIGNIFICANT CHANGE UP (ref 0–0.5)
EOSINOPHIL NFR BLD AUTO: 0.6 % — SIGNIFICANT CHANGE UP (ref 0–6)
FIBRINOGEN PPP-MCNC: 738 MG/DL — HIGH (ref 200–450)
GLUCOSE SERPL-MCNC: 99 MG/DL — SIGNIFICANT CHANGE UP (ref 70–99)
HCT VFR BLD CALC: 39.1 % — SIGNIFICANT CHANGE UP (ref 34.5–45)
HGB BLD-MCNC: 13 G/DL — SIGNIFICANT CHANGE UP (ref 11.5–15.5)
IMM GRANULOCYTES NFR BLD AUTO: 1.4 % — HIGH (ref 0–0.9)
INR BLD: 0.82 RATIO — LOW (ref 0.85–1.18)
LYMPHOCYTES # BLD AUTO: 14 % — SIGNIFICANT CHANGE UP (ref 13–44)
LYMPHOCYTES # BLD AUTO: 2.02 K/UL — SIGNIFICANT CHANGE UP (ref 1–3.3)
MAGNESIUM SERPL-MCNC: 1.5 MG/DL — LOW (ref 1.6–2.6)
MCHC RBC-ENTMCNC: 29.6 PG — SIGNIFICANT CHANGE UP (ref 27–34)
MCHC RBC-ENTMCNC: 33.2 GM/DL — SIGNIFICANT CHANGE UP (ref 32–36)
MCV RBC AUTO: 89.1 FL — SIGNIFICANT CHANGE UP (ref 80–100)
MONOCYTES # BLD AUTO: 1.12 K/UL — HIGH (ref 0–0.9)
MONOCYTES NFR BLD AUTO: 7.8 % — SIGNIFICANT CHANGE UP (ref 2–14)
NEUTROPHILS # BLD AUTO: 10.93 K/UL — HIGH (ref 1.8–7.4)
NEUTROPHILS NFR BLD AUTO: 75.9 % — SIGNIFICANT CHANGE UP (ref 43–77)
PHOSPHATE SERPL-MCNC: 4.6 MG/DL — SIGNIFICANT CHANGE UP (ref 2.4–4.7)
PLATELET # BLD AUTO: 330 K/UL — SIGNIFICANT CHANGE UP (ref 150–400)
POTASSIUM SERPL-MCNC: 4.2 MMOL/L — SIGNIFICANT CHANGE UP (ref 3.5–5.3)
POTASSIUM SERPL-SCNC: 4.2 MMOL/L — SIGNIFICANT CHANGE UP (ref 3.5–5.3)
PROT SERPL-MCNC: 6.5 G/DL — LOW (ref 6.6–8.7)
PROTHROM AB SERPL-ACNC: 9.2 SEC — LOW (ref 9.5–13)
RBC # BLD: 4.39 M/UL — SIGNIFICANT CHANGE UP (ref 3.8–5.2)
RBC # FLD: 13.5 % — SIGNIFICANT CHANGE UP (ref 10.3–14.5)
SODIUM SERPL-SCNC: 137 MMOL/L — SIGNIFICANT CHANGE UP (ref 135–145)
WBC # BLD: 14.41 K/UL — HIGH (ref 3.8–10.5)
WBC # FLD AUTO: 14.41 K/UL — HIGH (ref 3.8–10.5)

## 2024-02-03 RX ORDER — OXYCODONE HYDROCHLORIDE 5 MG/1
5 TABLET ORAL
Refills: 0 | Status: DISCONTINUED | OUTPATIENT
Start: 2024-02-03 | End: 2024-02-03

## 2024-02-03 RX ORDER — MAGNESIUM SULFATE 500 MG/ML
2 VIAL (ML) INJECTION ONCE
Refills: 0 | Status: COMPLETED | OUTPATIENT
Start: 2024-02-03 | End: 2024-02-03

## 2024-02-03 RX ORDER — HYDROMORPHONE HYDROCHLORIDE 2 MG/ML
2 INJECTION INTRAMUSCULAR; INTRAVENOUS; SUBCUTANEOUS EVERY 6 HOURS
Refills: 0 | Status: DISCONTINUED | OUTPATIENT
Start: 2024-02-03 | End: 2024-02-04

## 2024-02-03 RX ORDER — HYDROMORPHONE HYDROCHLORIDE 2 MG/ML
1 INJECTION INTRAMUSCULAR; INTRAVENOUS; SUBCUTANEOUS ONCE
Refills: 0 | Status: DISCONTINUED | OUTPATIENT
Start: 2024-02-03 | End: 2024-02-03

## 2024-02-03 RX ORDER — ONDANSETRON 8 MG/1
4 TABLET, FILM COATED ORAL ONCE
Refills: 0 | Status: DISCONTINUED | OUTPATIENT
Start: 2024-02-03 | End: 2024-02-04

## 2024-02-03 RX ADMIN — HYDROMORPHONE HYDROCHLORIDE 2 MILLIGRAM(S): 2 INJECTION INTRAMUSCULAR; INTRAVENOUS; SUBCUTANEOUS at 19:48

## 2024-02-03 RX ADMIN — OXYCODONE HYDROCHLORIDE 5 MILLIGRAM(S): 5 TABLET ORAL at 03:30

## 2024-02-03 RX ADMIN — Medication 600 MILLIGRAM(S): at 23:24

## 2024-02-03 RX ADMIN — OXYCODONE HYDROCHLORIDE 5 MILLIGRAM(S): 5 TABLET ORAL at 10:31

## 2024-02-03 RX ADMIN — OXYCODONE HYDROCHLORIDE 5 MILLIGRAM(S): 5 TABLET ORAL at 14:24

## 2024-02-03 RX ADMIN — Medication 600 MILLIGRAM(S): at 12:51

## 2024-02-03 RX ADMIN — OXYCODONE HYDROCHLORIDE 5 MILLIGRAM(S): 5 TABLET ORAL at 15:15

## 2024-02-03 RX ADMIN — ONDANSETRON 4 MILLIGRAM(S): 8 TABLET, FILM COATED ORAL at 16:10

## 2024-02-03 RX ADMIN — HYDROMORPHONE HYDROCHLORIDE 2 MILLIGRAM(S): 2 INJECTION INTRAMUSCULAR; INTRAVENOUS; SUBCUTANEOUS at 20:30

## 2024-02-03 RX ADMIN — Medication 600 MILLIGRAM(S): at 06:30

## 2024-02-03 RX ADMIN — HYDROMORPHONE HYDROCHLORIDE 1 MILLIGRAM(S): 2 INJECTION INTRAMUSCULAR; INTRAVENOUS; SUBCUTANEOUS at 16:28

## 2024-02-03 RX ADMIN — Medication 25 GRAM(S): at 21:14

## 2024-02-03 RX ADMIN — Medication 975 MILLIGRAM(S): at 15:18

## 2024-02-03 RX ADMIN — OXYCODONE HYDROCHLORIDE 5 MILLIGRAM(S): 5 TABLET ORAL at 17:26

## 2024-02-03 RX ADMIN — OXYCODONE HYDROCHLORIDE 5 MILLIGRAM(S): 5 TABLET ORAL at 02:23

## 2024-02-03 RX ADMIN — OXYCODONE HYDROCHLORIDE 5 MILLIGRAM(S): 5 TABLET ORAL at 11:15

## 2024-02-03 RX ADMIN — Medication 600 MILLIGRAM(S): at 18:01

## 2024-02-03 RX ADMIN — HYDROMORPHONE HYDROCHLORIDE 1 MILLIGRAM(S): 2 INJECTION INTRAMUSCULAR; INTRAVENOUS; SUBCUTANEOUS at 16:10

## 2024-02-03 RX ADMIN — SIMETHICONE 80 MILLIGRAM(S): 80 TABLET, CHEWABLE ORAL at 16:00

## 2024-02-03 RX ADMIN — Medication 975 MILLIGRAM(S): at 08:40

## 2024-02-03 NOTE — PROGRESS NOTE ADULT - ASSESSMENT
ASSESSMENT:  ADRIAN OHARA is a 33y yo  POD2 s/p rCS+BS, PRANAV, right ovarian cystectomy due to labor in setting of prior CS. s/p TXA  Patient has no complaints at this time, is otherwise clinically and hemodynamically stable.   girl is with patient at bedside    #CS Postpartum   - Continue routine post-operative and post-partum care  - Hgb 13.3 > 12.4  - Rub: I/I  - TOV pending  - Continue with current pain management  - Encouraged maternal- bonding  - Encouraged ambulation and use of incentive spirometer  - Zofran for nausea    - Diet: Regular, advance as tolerated  - DVT ppx: SCDs and subQ Lovenox  - Dispo: Home POD2 per Attending's approval

## 2024-02-03 NOTE — PROGRESS NOTE ADULT - SUBJECTIVE AND OBJECTIVE BOX
SUBJECTIVE:  Patient seen and examined this morning at bedside. No acute events overnight.    OBJECTIVE:  Physical exam:  General: AOx3, NAD.  Heart: S1/S2 with regular rate and normal rhythm.  Lungs: CTABL, no crackles or wheezing.   Abdomen: +BS, Soft, appropriately tender, nondistended, no guarding or rebound tenderness, firm uterine fundus at umbilicus  Incision: clean dry and intact with steris. No erythema or discharge.  Ext: BL LE reveal minimal swelling, no popliteal tenderness or skin changes.     Vital Signs Last 24 Hrs  T(C): 36.8 (02 Feb 2024 20:22), Max: 37 (02 Feb 2024 07:42)  T(F): 98.2 (02 Feb 2024 20:22), Max: 98.6 (02 Feb 2024 07:42)  HR: 100 (02 Feb 2024 20:22) (70 - 100)  BP: 102/64 (02 Feb 2024 20:22) (94/57 - 112/72)                          12.4   16.42 )-----------( 304      ( 02 Feb 2024 05:20 )             37.0

## 2024-02-03 NOTE — CHART NOTE - NSCHARTNOTEFT_GEN_A_CORE
Patient seen and evaluated at bedside for pain.   VSS and PE benign.   Labs ordered and WNL.   Dilaudid 1mg IVP given for breakthrough pain with relief.   Will discontinue oxycodone and continue with dilaudid PO for severe pain.   Continue Tylenol q6h and Ibuprofen q6h.     Discussed with Dr. Roberson

## 2024-02-03 NOTE — PROGRESS NOTE ADULT - ATTENDING COMMENTS
POD#2  s/p RCS and BS  Doing well  meeting milestones  incision C/D/I  Hgb 12.4  f/u in the office for incision check and PPV#1  precautions provided  discharge home  ppalos

## 2024-02-04 VITALS
DIASTOLIC BLOOD PRESSURE: 68 MMHG | RESPIRATION RATE: 18 BRPM | OXYGEN SATURATION: 99 % | SYSTOLIC BLOOD PRESSURE: 100 MMHG | TEMPERATURE: 98 F | HEART RATE: 83 BPM

## 2024-02-04 PROCEDURE — 86850 RBC ANTIBODY SCREEN: CPT

## 2024-02-04 PROCEDURE — 88305 TISSUE EXAM BY PATHOLOGIST: CPT

## 2024-02-04 PROCEDURE — 59050 FETAL MONITOR W/REPORT: CPT

## 2024-02-04 PROCEDURE — 85025 COMPLETE CBC W/AUTO DIFF WBC: CPT

## 2024-02-04 PROCEDURE — 86901 BLOOD TYPING SEROLOGIC RH(D): CPT

## 2024-02-04 PROCEDURE — 83615 LACTATE (LD) (LDH) ENZYME: CPT

## 2024-02-04 PROCEDURE — 84550 ASSAY OF BLOOD/URIC ACID: CPT

## 2024-02-04 PROCEDURE — 86780 TREPONEMA PALLIDUM: CPT

## 2024-02-04 PROCEDURE — 84100 ASSAY OF PHOSPHORUS: CPT

## 2024-02-04 PROCEDURE — 36415 COLL VENOUS BLD VENIPUNCTURE: CPT

## 2024-02-04 PROCEDURE — 85384 FIBRINOGEN ACTIVITY: CPT

## 2024-02-04 PROCEDURE — 81001 URINALYSIS AUTO W/SCOPE: CPT

## 2024-02-04 PROCEDURE — 86900 BLOOD TYPING SEROLOGIC ABO: CPT

## 2024-02-04 PROCEDURE — 84156 ASSAY OF PROTEIN URINE: CPT

## 2024-02-04 PROCEDURE — 85730 THROMBOPLASTIN TIME PARTIAL: CPT

## 2024-02-04 PROCEDURE — 85610 PROTHROMBIN TIME: CPT

## 2024-02-04 PROCEDURE — 80053 COMPREHEN METABOLIC PANEL: CPT

## 2024-02-04 PROCEDURE — 88302 TISSUE EXAM BY PATHOLOGIST: CPT

## 2024-02-04 PROCEDURE — 86703 HIV-1/HIV-2 1 RESULT ANTBDY: CPT

## 2024-02-04 PROCEDURE — 82570 ASSAY OF URINE CREATININE: CPT

## 2024-02-04 PROCEDURE — 83735 ASSAY OF MAGNESIUM: CPT

## 2024-02-04 PROCEDURE — 87389 HIV-1 AG W/HIV-1&-2 AB AG IA: CPT

## 2024-02-04 PROCEDURE — C1889: CPT

## 2024-02-04 RX ORDER — OXYCODONE HYDROCHLORIDE 5 MG/1
1 TABLET ORAL
Qty: 6 | Refills: 0
Start: 2024-02-04 | End: 2024-02-05

## 2024-02-04 RX ADMIN — Medication 975 MILLIGRAM(S): at 08:47

## 2024-02-04 RX ADMIN — Medication 600 MILLIGRAM(S): at 11:28

## 2024-02-04 RX ADMIN — Medication 975 MILLIGRAM(S): at 03:28

## 2024-02-04 RX ADMIN — HYDROMORPHONE HYDROCHLORIDE 2 MILLIGRAM(S): 2 INJECTION INTRAMUSCULAR; INTRAVENOUS; SUBCUTANEOUS at 01:42

## 2024-02-04 RX ADMIN — HYDROMORPHONE HYDROCHLORIDE 2 MILLIGRAM(S): 2 INJECTION INTRAMUSCULAR; INTRAVENOUS; SUBCUTANEOUS at 03:00

## 2024-02-04 RX ADMIN — Medication 600 MILLIGRAM(S): at 06:19

## 2024-02-04 NOTE — PROGRESS NOTE ADULT - SUBJECTIVE AND OBJECTIVE BOX
Subjective:  The patient feels well.  She is ambulating without difficulty.  She is tolerating PO.  She is voiding.  She denies nausea and vomiting.  Her pain is controlled.  She reports normal postpartum bleeding      Physical exam:    Vital Signs Last 24 Hrs  T(C): 36.2 (04 Feb 2024 04:36), Max: 36.9 (03 Feb 2024 20:00)  T(F): 97.2 (04 Feb 2024 04:36), Max: 98.5 (03 Feb 2024 20:00)  HR: 89 (04 Feb 2024 04:36) (89 - 100)  BP: 101/66 (04 Feb 2024 04:36) (98/60 - 126/80)  BP(mean): --  RR: 18 (04 Feb 2024 04:36) (18 - 18)  SpO2: 95% (04 Feb 2024 04:36) (95% - 99%)    Parameters below as of 04 Feb 2024 04:36  Patient On (Oxygen Delivery Method): room air        Gen: NAD  Breast :  Abdomen: Soft, nontender, no distension , firm uterine fundus at umbilicus.  Incision: Clean, dry, and intact with steri strips  Pelvic: Normal lochia noted  Ext: No calf tenderness    LABS:                        13.0   14.41 )-----------( 330      ( 03 Feb 2024 16:05 )             39.1     blood type    A/P POD # 3 s/p    Doing well.  Encourage ambulation.  Discharge home today.  Prescriptions for percocet and motrin electronically sent to the pharmacy.  F/U in 2 weeks for incision check.  Call for fevers, chills, nausea, vomiting, heavy vaginal bleeding, vaginal discharge, severe pain, symptoms of depression, problems with incision or any other concerning symptoms.  Nothing in vagina and no heavy lifting x 6 weeks.  No driving x 2 weeks.  Questions answered.

## 2024-02-04 NOTE — PROGRESS NOTE ADULT - SUBJECTIVE AND OBJECTIVE BOX
ADRIAN OHARA is a 33y  now POD#3 s/p repeat  section and bilateral salpingectomy at 35w2d gestation secondary to  labor.     S:    The patient has no complaints.   Pain is controlled with current treatment regimen.   She is ambulating without difficulty and tolerating PO.   + flatus/-BM/+ voiding   She endorses appropriate lochia, which is decreasing.     O:    T(C): 36.7 (24 @ 00:00), Max: 36.9 (24 @ 20:00)  HR: 93 (24 @ 00:00) (91 - 100)  BP: 126/80 (24 @ 00:00) (98/60 - 126/80)  RR: 18 (24 @ 00:00) (18 - 18)  SpO2: 99% (24 @ 00:00) (97% - 99%)    Gen: NAD, AOx3  CV: RRR  Pulm: CTAB  Breast: Nontender, not engorged   Abdomen:  Soft, non-tender, non-distended, +bowel sounds.  Uterus:  Fundus firm below umbilicus  Incision:  Clean/dry/intact with steri-strips in place  VE:  +Lochia  Ext:  Non-tender, non-edematous                           13.0   14.41 )-----------( 330      ( 2024 16:05 )             39.1     02-03    137  |  102  |  5.3<L>  ----------------------------<  99  4.2   |  21.0<L>  |  0.46<L>    Ca    9.0      2024 16:05  Phos  4.6     02-03  Mg     1.5     02-03    TPro  6.5<L>  /  Alb  3.2<L>  /  TBili  <0.2<L>  /  DBili  x   /  AST  22  /  ALT  17  /  AlkPhos  100  02-03

## 2024-02-04 NOTE — PROGRESS NOTE ADULT - ASSESSMENT
A/P:  33y  now POD#3 s/p repeat  section and bilateral salpingectomy at 35w2d gestation secondary to  labor.   -Vital Signs Stable  -Voiding, tolerating PO, bowel function nml   -Heme: Hgb 13  -Advance care as tolerated   -Continue routine postpartum/postoperative care and education  -Healthy female infant  -Dispo: Pt is stable for discharge to home pending attending approval.

## 2024-02-06 ENCOUNTER — APPOINTMENT (OUTPATIENT)
Dept: ANTEPARTUM | Facility: CLINIC | Age: 34
End: 2024-02-06

## 2024-02-15 LAB — SURGICAL PATHOLOGY STUDY: SIGNIFICANT CHANGE UP

## 2024-02-17 ENCOUNTER — INPATIENT (INPATIENT)
Facility: HOSPITAL | Age: 34
LOS: 4 days | Discharge: HOME | DRG: 776 | End: 2024-02-22
Attending: OBSTETRICS & GYNECOLOGY | Admitting: OBSTETRICS & GYNECOLOGY
Payer: COMMERCIAL

## 2024-02-17 VITALS
SYSTOLIC BLOOD PRESSURE: 118 MMHG | TEMPERATURE: 99 F | RESPIRATION RATE: 18 BRPM | HEIGHT: 65 IN | WEIGHT: 203.05 LBS | DIASTOLIC BLOOD PRESSURE: 79 MMHG | HEART RATE: 99 BPM | OXYGEN SATURATION: 100 %

## 2024-02-17 DIAGNOSIS — Z98.891 HISTORY OF UTERINE SCAR FROM PREVIOUS SURGERY: Chronic | ICD-10-CM

## 2024-02-17 LAB
ALBUMIN SERPL ELPH-MCNC: 4.1 G/DL — SIGNIFICANT CHANGE UP (ref 3.3–5.2)
ALP SERPL-CCNC: 118 U/L — SIGNIFICANT CHANGE UP (ref 40–120)
ALT FLD-CCNC: 16 U/L — SIGNIFICANT CHANGE UP
ANION GAP SERPL CALC-SCNC: 13 MMOL/L — SIGNIFICANT CHANGE UP (ref 5–17)
APTT BLD: 41.7 SEC — HIGH (ref 24.5–35.6)
AST SERPL-CCNC: 17 U/L — SIGNIFICANT CHANGE UP
BASOPHILS # BLD AUTO: 0.03 K/UL — SIGNIFICANT CHANGE UP (ref 0–0.2)
BASOPHILS NFR BLD AUTO: 0.3 % — SIGNIFICANT CHANGE UP (ref 0–2)
BILIRUB SERPL-MCNC: <0.2 MG/DL — LOW (ref 0.4–2)
BLD GP AB SCN SERPL QL: SIGNIFICANT CHANGE UP
BUN SERPL-MCNC: 10.4 MG/DL — SIGNIFICANT CHANGE UP (ref 8–20)
CALCIUM SERPL-MCNC: 9.4 MG/DL — SIGNIFICANT CHANGE UP (ref 8.4–10.5)
CHLORIDE SERPL-SCNC: 104 MMOL/L — SIGNIFICANT CHANGE UP (ref 96–108)
CO2 SERPL-SCNC: 25 MMOL/L — SIGNIFICANT CHANGE UP (ref 22–29)
CREAT SERPL-MCNC: 0.58 MG/DL — SIGNIFICANT CHANGE UP (ref 0.5–1.3)
EGFR: 122 ML/MIN/1.73M2 — SIGNIFICANT CHANGE UP
EOSINOPHIL # BLD AUTO: 0.16 K/UL — SIGNIFICANT CHANGE UP (ref 0–0.5)
EOSINOPHIL NFR BLD AUTO: 1.5 % — SIGNIFICANT CHANGE UP (ref 0–6)
GLUCOSE SERPL-MCNC: 95 MG/DL — SIGNIFICANT CHANGE UP (ref 70–99)
HCG SERPL-ACNC: <4 MIU/ML — SIGNIFICANT CHANGE UP
HCT VFR BLD CALC: 45.9 % — HIGH (ref 34.5–45)
HGB BLD-MCNC: 15.7 G/DL — HIGH (ref 11.5–15.5)
IMM GRANULOCYTES NFR BLD AUTO: 0.6 % — SIGNIFICANT CHANGE UP (ref 0–0.9)
INR BLD: 0.99 RATIO — SIGNIFICANT CHANGE UP (ref 0.85–1.18)
LIDOCAIN IGE QN: 43 U/L — SIGNIFICANT CHANGE UP (ref 22–51)
LYMPHOCYTES # BLD AUTO: 2.16 K/UL — SIGNIFICANT CHANGE UP (ref 1–3.3)
LYMPHOCYTES # BLD AUTO: 20.2 % — SIGNIFICANT CHANGE UP (ref 13–44)
MCHC RBC-ENTMCNC: 30 PG — SIGNIFICANT CHANGE UP (ref 27–34)
MCHC RBC-ENTMCNC: 34.2 GM/DL — SIGNIFICANT CHANGE UP (ref 32–36)
MCV RBC AUTO: 87.6 FL — SIGNIFICANT CHANGE UP (ref 80–100)
MONOCYTES # BLD AUTO: 0.6 K/UL — SIGNIFICANT CHANGE UP (ref 0–0.9)
MONOCYTES NFR BLD AUTO: 5.6 % — SIGNIFICANT CHANGE UP (ref 2–14)
NEUTROPHILS # BLD AUTO: 7.66 K/UL — HIGH (ref 1.8–7.4)
NEUTROPHILS NFR BLD AUTO: 71.8 % — SIGNIFICANT CHANGE UP (ref 43–77)
PLATELET # BLD AUTO: 594 K/UL — HIGH (ref 150–400)
POTASSIUM SERPL-MCNC: 4.3 MMOL/L — SIGNIFICANT CHANGE UP (ref 3.5–5.3)
POTASSIUM SERPL-SCNC: 4.3 MMOL/L — SIGNIFICANT CHANGE UP (ref 3.5–5.3)
PROT SERPL-MCNC: 7.7 G/DL — SIGNIFICANT CHANGE UP (ref 6.6–8.7)
PROTHROM AB SERPL-ACNC: 11 SEC — SIGNIFICANT CHANGE UP (ref 9.5–13)
RBC # BLD: 5.24 M/UL — HIGH (ref 3.8–5.2)
RBC # FLD: 12.3 % — SIGNIFICANT CHANGE UP (ref 10.3–14.5)
SODIUM SERPL-SCNC: 142 MMOL/L — SIGNIFICANT CHANGE UP (ref 135–145)
WBC # BLD: 10.67 K/UL — HIGH (ref 3.8–10.5)
WBC # FLD AUTO: 10.67 K/UL — HIGH (ref 3.8–10.5)

## 2024-02-17 PROCEDURE — 99285 EMERGENCY DEPT VISIT HI MDM: CPT

## 2024-02-17 PROCEDURE — 76705 ECHO EXAM OF ABDOMEN: CPT | Mod: 26

## 2024-02-17 RX ORDER — FAMOTIDINE 10 MG/ML
20 INJECTION INTRAVENOUS ONCE
Refills: 0 | Status: COMPLETED | OUTPATIENT
Start: 2024-02-17 | End: 2024-02-17

## 2024-02-17 RX ORDER — MORPHINE SULFATE 50 MG/1
4 CAPSULE, EXTENDED RELEASE ORAL ONCE
Refills: 0 | Status: DISCONTINUED | OUTPATIENT
Start: 2024-02-17 | End: 2024-02-17

## 2024-02-17 RX ORDER — ONDANSETRON 8 MG/1
4 TABLET, FILM COATED ORAL ONCE
Refills: 0 | Status: COMPLETED | OUTPATIENT
Start: 2024-02-17 | End: 2024-02-17

## 2024-02-17 RX ADMIN — MORPHINE SULFATE 4 MILLIGRAM(S): 50 CAPSULE, EXTENDED RELEASE ORAL at 19:46

## 2024-02-17 RX ADMIN — ONDANSETRON 4 MILLIGRAM(S): 8 TABLET, FILM COATED ORAL at 19:46

## 2024-02-17 RX ADMIN — FAMOTIDINE 20 MILLIGRAM(S): 10 INJECTION INTRAVENOUS at 22:28

## 2024-02-17 NOTE — ED ADULT TRIAGE NOTE - CHIEF COMPLAINT QUOTE
pt states she had a c section 15 days ago & is having upper abd pain x 15 days  A&Ox3, resp wnl, no BM , only small amount

## 2024-02-17 NOTE — ED PROVIDER NOTE - CLINICAL SUMMARY MEDICAL DECISION MAKING FREE TEXT BOX
33 yof s/p   here with 2 weeks of worsening epigastric pain. Has been taking pain meds and oxycodone for pain without relief. Tried to follow up in clinic but appt was cancelled, follows at [facility]. Reports subjective fever. Denies cp, sob, back pain. Pain is worse with eating.   AP - tender in epigastrum. will get labs/US to eval for biliary colic. csection site appears intact, no pain over region but since wijuanain 30 days post op will get OB consult    Ramirez Devlin 33 yof s/p   here with 2 weeks of worsening epigastric pain. Has been taking pain meds and oxycodone for pain without relief. Tried to follow up in clinic but appt was cancelled, follows at [place]. Reports subjective fever. Denies cp, sob, back pain. Pain is worse with eating.   AP - tender in epigastrum. will get labs/US to eval for biliary colic. csection site appears intact, no pain over region but since wihtin 30 days post op will get OB consult  ELEONORA Villatoro: labs without emergent findings, wbc 10.67k otherwise unremarkable. RUQ ultrasound WNL. OB consulted given recent , recommended CTAP. CT demonstrating "3.1 x 4.6 cm collection anterior to the scar, compatible with bladder flap hematoma" as well as left lower rectus sheath hematoma measuring 2.6 cm. obgyn made aware    Intrepdelroyor Claus 33 yof s/p  2 here with 2 weeks of worsening epigastric pain. Has been taking pain meds and oxycodone for pain without relief. Tried to follow up in clinic but appt was cancelled, follows at [place]. Reports subjective fever. Denies cp, sob, back pain. Pain is worse with eating.   AP - tender in epigastrum. will get labs/US to eval for biliary colic. csection site appears intact, no pain over region but since wihtin 30 days post op will get OB consult  ELEONORA Villatoro: labs without emergent findings, wbc 10.67k otherwise unremarkable. RUQ ultrasound WNL. OB consulted given recent , recommended CTAP. CT demonstrating "3.1 x 4.6 cm collection anterior to the scar, compatible with bladder flap hematoma" as well as left lower rectus sheath hematoma measuring 2.6 cm. obgyn re-evaluated pt, still c/o pain. pt admitted to OBGYN    Ramirez Devlin

## 2024-02-17 NOTE — ED PROVIDER NOTE - PROGRESS NOTE DETAILS
ELEONORA Villatoro: PT evaluated by intake physician. HPI/PE/ROS as noted above. Will follow up plan per intake physician. pt feeling better after medications. pending US results  : Sen seen by OBROBBIEN at this time, requesting CTAP to eval for infection ELEONORA Villatoor: CT demonstrating "3.1 x 4.6 cm collection anterior to the scar, compatible with bladder   flap hematoma" as well as left lower rectus sheath hematoma measuring 2.6 cm. obgyn made aware

## 2024-02-17 NOTE — ED PROVIDER NOTE - OBJECTIVE STATEMENT
33 yof s/p   here with 2 weeks of worsening epigastric pain. Has been taking pain meds and oxycodone for pain without relief. Tried to follow up in clinic but appt was cancelled, follows at [place]. Reports subjective fever. Denies cp, sob, back pain. Pain is worse with eating.

## 2024-02-17 NOTE — ED PROVIDER NOTE - PHYSICAL EXAMINATION
Vital Signs per nursing documentation  Gen: well appearing, no acute distress  HEENT: NCAT, MMM  Cardiac: regular rate rhythm, normal S1S2  Chest: clear to auscultation bilateral, no wheezes or crackles  Abdomen: soft, tender in epigastrum, incision site appear clean dry intact  Extremity: no gross deformity, good perfusion  Skin: no rash  Neuro: nonfocal neuro exam, gait steady

## 2024-02-17 NOTE — ED PROVIDER NOTE - CARE PLAN
Principal Discharge DX:	Epigastric abdominal pain   1 Principal Discharge DX:	Epigastric abdominal pain  Secondary Diagnosis:	Constipation

## 2024-02-17 NOTE — ED ADULT NURSE NOTE - NSFALLUNIVINTERV_ED_ALL_ED
Bed/Stretcher in lowest position, wheels locked, appropriate side rails in place/Call bell, personal items and telephone in reach/Instruct patient to call for assistance before getting out of bed/chair/stretcher/Non-slip footwear applied when patient is off stretcher/[place] to call system/Physically safe environment - no spills, clutter or unnecessary equipment/Purposeful proactive rounding/Room/bathroom lighting operational, light cord in reach

## 2024-02-17 NOTE — ED ADULT NURSE NOTE - CAS EDP DISCH DISPOSITION ADMI
CDU/CDP Bexarotene Counseling:  I discussed with the patient the risks of bexarotene including but not limited to hair loss, dry lips/skin/eyes, liver abnormalities, hyperlipidemia, pancreatitis, depression/suicidal ideation, photosensitivity, drug rash/allergic reactions, hypothyroidism, anemia, leukopenia, infection, cataracts, and teratogenicity.  Patient understands that they will need regular blood tests to check lipid profile, liver function tests, white blood cell count, thyroid function tests and pregnancy test if applicable.

## 2024-02-17 NOTE — ED ADULT NURSE NOTE - ED STAT RN HANDOFF DETAILS
pt stable HR given to Ashley RN NAD noted RR even unlabored no acute distress at this time transported without issue safety maintained

## 2024-02-17 NOTE — ED PROVIDER NOTE - ATTENDING APP SHARED VISIT CONTRIBUTION OF CARE
I, Jackie Blake, performed a face to face bedside interview with this patient regarding history of present illness, review of symptoms and relevant past medical, social and family history.  I completed an independent physical examination. Medical decision making, follow-up on ordered tests (ie labs, radiologic studies) and re-evaluation of the patient's status has been communicated to the ACP.  Disposition of the patient will be based on test outcome and response to ED interventions.

## 2024-02-17 NOTE — ED ADULT NURSE NOTE - OBJECTIVE STATEMENT
patient a/ox4 presents to ED c/o mid abdominal pain that began 15x days ago. patient states nausea but no V/D. patient denies chest pain. patient denies shortness of breath; patient breathing unlabored and even. patient denies known allergies.

## 2024-02-18 DIAGNOSIS — R10.13 EPIGASTRIC PAIN: ICD-10-CM

## 2024-02-18 LAB
ALBUMIN SERPL ELPH-MCNC: 3.8 G/DL — SIGNIFICANT CHANGE UP (ref 3.3–5.2)
ALP SERPL-CCNC: 109 U/L — SIGNIFICANT CHANGE UP (ref 40–120)
ALT FLD-CCNC: 15 U/L — SIGNIFICANT CHANGE UP
ANION GAP SERPL CALC-SCNC: 14 MMOL/L — SIGNIFICANT CHANGE UP (ref 5–17)
AST SERPL-CCNC: 16 U/L — SIGNIFICANT CHANGE UP
BILIRUB SERPL-MCNC: 0.4 MG/DL — SIGNIFICANT CHANGE UP (ref 0.4–2)
BUN SERPL-MCNC: 9.1 MG/DL — SIGNIFICANT CHANGE UP (ref 8–20)
CALCIUM SERPL-MCNC: 8.9 MG/DL — SIGNIFICANT CHANGE UP (ref 8.4–10.5)
CHLORIDE SERPL-SCNC: 103 MMOL/L — SIGNIFICANT CHANGE UP (ref 96–108)
CO2 SERPL-SCNC: 22 MMOL/L — SIGNIFICANT CHANGE UP (ref 22–29)
CREAT SERPL-MCNC: 0.61 MG/DL — SIGNIFICANT CHANGE UP (ref 0.5–1.3)
EGFR: 120 ML/MIN/1.73M2 — SIGNIFICANT CHANGE UP
GLUCOSE SERPL-MCNC: 90 MG/DL — SIGNIFICANT CHANGE UP (ref 70–99)
HCT VFR BLD CALC: 43.4 % — SIGNIFICANT CHANGE UP (ref 34.5–45)
HGB BLD-MCNC: 14.7 G/DL — SIGNIFICANT CHANGE UP (ref 11.5–15.5)
MCHC RBC-ENTMCNC: 29.5 PG — SIGNIFICANT CHANGE UP (ref 27–34)
MCHC RBC-ENTMCNC: 33.9 GM/DL — SIGNIFICANT CHANGE UP (ref 32–36)
MCV RBC AUTO: 87.1 FL — SIGNIFICANT CHANGE UP (ref 80–100)
PLATELET # BLD AUTO: 548 K/UL — HIGH (ref 150–400)
POTASSIUM SERPL-MCNC: 4 MMOL/L — SIGNIFICANT CHANGE UP (ref 3.5–5.3)
POTASSIUM SERPL-SCNC: 4 MMOL/L — SIGNIFICANT CHANGE UP (ref 3.5–5.3)
PROT SERPL-MCNC: 7.1 G/DL — SIGNIFICANT CHANGE UP (ref 6.6–8.7)
RBC # BLD: 4.98 M/UL — SIGNIFICANT CHANGE UP (ref 3.8–5.2)
RBC # FLD: 12.4 % — SIGNIFICANT CHANGE UP (ref 10.3–14.5)
SODIUM SERPL-SCNC: 138 MMOL/L — SIGNIFICANT CHANGE UP (ref 135–145)
WBC # BLD: 9.52 K/UL — SIGNIFICANT CHANGE UP (ref 3.8–10.5)
WBC # FLD AUTO: 9.52 K/UL — SIGNIFICANT CHANGE UP (ref 3.8–10.5)

## 2024-02-18 PROCEDURE — 99222 1ST HOSP IP/OBS MODERATE 55: CPT | Mod: GC

## 2024-02-18 PROCEDURE — 74177 CT ABD & PELVIS W/CONTRAST: CPT | Mod: 26,MD

## 2024-02-18 RX ORDER — PIPERACILLIN AND TAZOBACTAM 4; .5 G/20ML; G/20ML
3.38 INJECTION, POWDER, LYOPHILIZED, FOR SOLUTION INTRAVENOUS ONCE
Refills: 0 | Status: COMPLETED | OUTPATIENT
Start: 2024-02-18 | End: 2024-02-18

## 2024-02-18 RX ORDER — SENNA PLUS 8.6 MG/1
2 TABLET ORAL ONCE
Refills: 0 | Status: COMPLETED | OUTPATIENT
Start: 2024-02-18 | End: 2024-02-18

## 2024-02-18 RX ORDER — KETOROLAC TROMETHAMINE 30 MG/ML
30 SYRINGE (ML) INJECTION EVERY 6 HOURS
Refills: 0 | Status: DISCONTINUED | OUTPATIENT
Start: 2024-02-18 | End: 2024-02-18

## 2024-02-18 RX ORDER — SODIUM CHLORIDE 9 MG/ML
1000 INJECTION, SOLUTION INTRAVENOUS
Refills: 0 | Status: DISCONTINUED | OUTPATIENT
Start: 2024-02-18 | End: 2024-02-22

## 2024-02-18 RX ORDER — POLYETHYLENE GLYCOL 3350 17 G/17G
17 POWDER, FOR SOLUTION ORAL EVERY 24 HOURS
Refills: 0 | Status: DISCONTINUED | OUTPATIENT
Start: 2024-02-18 | End: 2024-02-22

## 2024-02-18 RX ORDER — ACETAMINOPHEN 500 MG
975 TABLET ORAL EVERY 6 HOURS
Refills: 0 | Status: DISCONTINUED | OUTPATIENT
Start: 2024-02-18 | End: 2024-02-22

## 2024-02-18 RX ORDER — MORPHINE SULFATE 50 MG/1
4 CAPSULE, EXTENDED RELEASE ORAL ONCE
Refills: 0 | Status: DISCONTINUED | OUTPATIENT
Start: 2024-02-18 | End: 2024-02-18

## 2024-02-18 RX ORDER — SENNA PLUS 8.6 MG/1
2 TABLET ORAL AT BEDTIME
Refills: 0 | Status: DISCONTINUED | OUTPATIENT
Start: 2024-02-18 | End: 2024-02-22

## 2024-02-18 RX ORDER — PANTOPRAZOLE SODIUM 20 MG/1
40 TABLET, DELAYED RELEASE ORAL ONCE
Refills: 0 | Status: COMPLETED | OUTPATIENT
Start: 2024-02-18 | End: 2024-02-18

## 2024-02-18 RX ORDER — MULTIVIT WITH MIN/MFOLATE/K2 340-15/3 G
296 POWDER (GRAM) ORAL ONCE
Refills: 0 | Status: COMPLETED | OUTPATIENT
Start: 2024-02-18 | End: 2024-02-18

## 2024-02-18 RX ORDER — ACETAMINOPHEN 500 MG
1000 TABLET ORAL ONCE
Refills: 0 | Status: COMPLETED | OUTPATIENT
Start: 2024-02-18 | End: 2024-02-18

## 2024-02-18 RX ADMIN — Medication 296 MILLILITER(S): at 06:19

## 2024-02-18 RX ADMIN — POLYETHYLENE GLYCOL 3350 17 GRAM(S): 17 POWDER, FOR SOLUTION ORAL at 13:37

## 2024-02-18 RX ADMIN — MORPHINE SULFATE 4 MILLIGRAM(S): 50 CAPSULE, EXTENDED RELEASE ORAL at 00:24

## 2024-02-18 RX ADMIN — Medication 30 MILLIGRAM(S): at 20:04

## 2024-02-18 RX ADMIN — SODIUM CHLORIDE 125 MILLILITER(S): 9 INJECTION, SOLUTION INTRAVENOUS at 06:14

## 2024-02-18 RX ADMIN — PIPERACILLIN AND TAZOBACTAM 200 GRAM(S): 4; .5 INJECTION, POWDER, LYOPHILIZED, FOR SOLUTION INTRAVENOUS at 06:18

## 2024-02-18 RX ADMIN — Medication 30 MILLIGRAM(S): at 15:35

## 2024-02-18 RX ADMIN — MORPHINE SULFATE 4 MILLIGRAM(S): 50 CAPSULE, EXTENDED RELEASE ORAL at 06:09

## 2024-02-18 RX ADMIN — Medication 975 MILLIGRAM(S): at 17:56

## 2024-02-18 RX ADMIN — Medication 400 MILLIGRAM(S): at 06:14

## 2024-02-18 RX ADMIN — Medication 975 MILLIGRAM(S): at 23:37

## 2024-02-18 RX ADMIN — PANTOPRAZOLE SODIUM 40 MILLIGRAM(S): 20 TABLET, DELAYED RELEASE ORAL at 06:14

## 2024-02-18 RX ADMIN — PIPERACILLIN AND TAZOBACTAM 25 GRAM(S): 4; .5 INJECTION, POWDER, LYOPHILIZED, FOR SOLUTION INTRAVENOUS at 14:08

## 2024-02-18 RX ADMIN — SENNA PLUS 2 TABLET(S): 8.6 TABLET ORAL at 05:31

## 2024-02-18 RX ADMIN — Medication 30 MILLIGRAM(S): at 06:15

## 2024-02-18 NOTE — H&P ADULT - NSHPPHYSICALEXAM_GEN_ALL_CORE
Height (cm): 165.1 (02-17-24 @ 18:00)  Weight (kg): 92.1 (02-17-24 @ 18:00)  BMI (kg/m2): 33.8 (02-17-24 @ 18:00)  BSA (m2): 1.99 (02-17-24 @ 18:00)  T(C): 37.1 (02-17-24 @ 18:00), Max: 37.1 (02-17-24 @ 18:00)  HR: 80 (02-18-24 @ 00:19) (80 - 99)  BP: 115/78 (02-18-24 @ 00:19) (115/78 - 118/79)  RR: 18 (02-17-24 @ 18:00) (18 - 18)  SpO2: 100% (02-17-24 @ 18:00) (100% - 100%)    Physical Exam:  Gen: alert oriented, tearful, uncomfortable appearing  Pulm: normal respiratory effort on RA  Abd: soft, tender to palpation right epigastric, non distended, well healed Pfannenstiel incision, fundus firm below umbilicus- tender on palpation   Gyn: Deferred   Msk: no erythema or swelling

## 2024-02-18 NOTE — H&P ADULT - NSHPLABSRESULTS_GEN_ALL_CORE
15.7   10.67 )-----------( 594      ( 2024 19:50 )             45.9           142  |  104  |  10.4  ----------------------------<  95  4.3   |  25.0  |  0.58    Ca    9.4      2024 19:50    TPro  7.7  /  Alb  4.1  /  TBili  <0.2<L>  /  DBili  x   /  AST  17  /  ALT  16  /  AlkPhos  118      < from: CT Abdomen and Pelvis w/ IV Cont (24 @ 04:22) >    IMPRESSION:  Post gravid uterus with low-attenuation  scar.    3.1 x 4.6 cm collection anterior to the scar, compatible with bladder   flap hematoma. Superimposed infection is not definitively excluded.    Heterogeneous thickened endometrium. Retained products of conception not   definitivelyexcluded. Pelvic ultrasound may be considered for further   evaluation.    Left lower rectus sheath hematoma measuring 2.6 cm in maximal thickness.        --- End of Report ---    < end of copied text >

## 2024-02-18 NOTE — CONSULT NOTE ADULT - ASSESSMENT
ADRIAN OHARA is a 34yo  s/p repeat C/S, right cystectomy and bilateral salpingectomy on  who presents with epigastric pain  Vitals reassuring  Labs wnl  Pt's abdominal pain, may be due to constipation, however pt appears uncomfortable out of proportion to constipation and warrants further evaluation. More information could be obtained with abdominal CT. Other etiologies for pain include, SBO or intra abdominal abscess.   Will f.u CT scan and reassess as needed       Dr. Pagan at bedside  ADRIAN OHARA is a 32yo  s/p repeat C/S, right cystectomy and bilateral salpingectomy on  who presents with epigastric pain  Vitals reassuring  Labs wnl  Pt's abdominal pain, may be due to constipation, however pt appears uncomfortable out of proportion to constipation and warrants further evaluation. More information could be obtained with abdominal CT. Other etiologies for pain include, SBO or intra abdominal abscess.   Will f.u CT scan and reassess as needed   Pendinf CT results, pt would benefit from bowel regimen for constipation.       Dr. Pagan at bedside

## 2024-02-18 NOTE — ED ADULT NURSE REASSESSMENT NOTE - NS ED NURSE REASSESS COMMENT FT1
pt received from Robby RN pt stable NAD noted medicated for pain as needed VSS on RA no acute issues at this time safety maintained

## 2024-02-18 NOTE — H&P ADULT - ATTENDING COMMENTS
32yo  s/p repeat C/S, right cystectomy and bilateral salpingectomy on  who presents with epigastric pain, worse with eating. Pt also reports constipation, states shes had on two small bowel movements since her  and tired taking stool softeners today. Pain is unrelieved by OTC pain medications. Reports nausea but no vomiting. Subjective fevers at home. She denies any dizziness, CP, SOB, HA, excessive bleeding but reports malodorous discharge.  T(C): 37.1 (24 @ 18:00), Max: 37.1 (24 @ 18:00)  HR: 80 (24 @ 00:19) (80 - 99)  BP: 115/78 (24 @ 00:19) (115/78 - 118/79)  PE - pt visibly uncomfortable     Abd: soft, tender above umbilicus, right side, and suprapubic   incision - c/d/i and well healed  WBC 10.7  CT - 3.1 x 4.6 cm collection anterior to the scar, compatible with bladder   flap hematoma. Superimposed infection is not definitively excluded.  32 y/o  POD#17 s/p R c/s with abdominal pain, constipation and  possibly infected hematoma vs endometritis   - pt required multiple doses of morphine while in the ED and pt still with c/o pain  - Given mildly elevated WBC, subjective fever and small possibly infected collection will admit and treat with Zosyn   - bowel regimen   - rpt labs in am    Antonia Cali MD

## 2024-02-18 NOTE — H&P ADULT - HISTORY OF PRESENT ILLNESS
ADRIAN OHARA is a 34yo  s/p repeat C/S, right cystectomy and bilateral salpingectomy on  who presents with epigastric pain, worse with eating. Pt also reports constipation, states shes had on two small bowel movements since her  and tired taking stool softeners today. Pain is unrelieved by OTC pain medications. Reports nausea but no vomiting. She denies any dizziness, CP, SOB, HA, excessive bleeding or unusual discharge.      ROS:  Gen: no fatigue  CV: no chest pain  Resp: no SOB, wheezing  Neuro: no vision change, headache  GI: no diarrhea  : no dysuria, increased frequency, urge  Gyn: no vaginal bleeding, abnormal discharge  ID: no chills  Int: no rash  MSK: no weakness      POBhx: c/s 40w  for failure to descend, rCS  at 35 weeks for NRFHT, rCS , Repeat  Section   PGYN: -fibroids/-cysts, denied STD hx, denies abnormal PAPs  PMH: asthma  PSH: 4 c-sections, BS, right cystectomy   SH: Denies tobacco use, EtOH use and illicit drug use during the pregnancy; Feels safe at home  Meds: prn albuterol, pnv  All: nkda   ADRIAN OHARA is a 34yo  s/p repeat C/S, right cystectomy and bilateral salpingectomy on  who presents with epigastric pain, worse with eating. Pt also reports constipation, states shes had on two small bowel movements since her  and tired taking stool softeners today. Pain is unrelieved by OTC pain medications. Reports nausea but no vomiting. Subjective fever and chills at home.  She denies any dizziness, CP, SOB, HA, excessive bleeding but reports malodorous discharge.     ROS:  Gen: no fatigue  CV: no chest pain  Resp: no SOB, wheezing  Neuro: no vision change, headache  GI: no diarrhea  : no dysuria, increased frequency, urge  Gyn: no vaginal bleeding, abnormal discharge  ID: no chills  Int: no rash  MSK: no weakness      POBhx: c/s 40w  for failure to descend, rCS  at 35 weeks for NRFHT, [facility] , Repeat  Section   PGYN: -fibroids/-cysts, denied STD hx, denies abnormal PAPs  PMH: asthma  PSH: 4 c-sections, BS, right cystectomy   SH: Denies tobacco use, EtOH use and illicit drug use during the pregnancy; Feels safe at home  Meds: prn albuterol, pnv  All: nkda

## 2024-02-18 NOTE — H&P ADULT - ASSESSMENT
ADRIAN OHARA is a 32yo  s/p repeat C/S, right cystectomy and bilateral salpingectomy on  who presents with epigastric pain  - Pt with large stool burden on CT- two small BM's since the time of delivery. May play large roll in her pain currently- will give bowel regimen.   - CT read concerning for pelvic collection- 3.1cm x 4.6cm- possible infectious collection vs Surgicel used during  for hemostasis. Pt endorsing foul smelling discharge on further questioning. Given elevated wbc, discharge, CT findings and persistent pain, possible endometritis- will treat empirically with zosyn x 24 hours and consider transitioning to Augmentin at the time of dispo.   - IV fluid   - VTE prophylaxis   - Breast pump     Dr. Pagan present at bedside

## 2024-02-18 NOTE — CONSULT NOTE ADULT - SUBJECTIVE AND OBJECTIVE BOX
ADRIAN OHARA is a 34yo  s/p repeat C/S, right cystectomy and bilateral salpingectomy on  who presents with epigastric pain, worse with eating. Pt also reports constipation, states shes had on two small bowel movements since her  and tired taking stool softeners today. Pain is unrelieved by OTC pain medications. Reports nausea but no vomiting. She denies any dizziness, CP, SOB, HA, excessive bleeding or unusual discharge.      ROS:  Gen: no fatigue  CV: no chest pain  Resp: no SOB, wheezing  Neuro: no vision change, headache  GI: no diarrhea  : no dysuria, increased frequency, urge  Gyn: no vaginal bleeding, abnormal discharge  ID: no chills  Int: no rash  MSK: no weakness      POBhx: c/s 40w  for failure to descend, rCS  at 35 weeks for NRFHT, rCS , Repeat  Section   PGYN: -fibroids/-cysts, denied STD hx, denies abnormal PAPs  PMH: asthma  PSH: 4 c-sections, BS, right cystectomy   SH: Denies tobacco use, EtOH use and illicit drug use during the pregnancy; Feels safe at home  Meds: prn albuterol, pnv  All: nkda    Height (cm): 165.1 (24 @ 18:00)  Weight (kg): 92.1 (24 @ 18:00)  BMI (kg/m2): 33.8 (24 @ 18:00)  BSA (m2): 1.99 (24 @ 18:00)  T(C): 37.1 (24 @ 18:00), Max: 37.1 (24 @ 18:00)  HR: 80 (24 @ 00:19) (80 - 99)  BP: 115/78 (24 @ 00:19) (115/78 - 118/79)  RR: 18 (24 @ 18:00) (18 - 18)  SpO2: 100% (24 @ 18:00) (100% - 100%)    Physical Exam:  Gen: alert oriented, tearful, uncomfortable appearing  Pulm: normal respiratory effort on RA  Abd: soft, tender to palpation right epigastric, non distended, well healed Pfannenstiel incision, fundus firm below umbilicus  Gyn: Deferred   Msk: no erythema or swelling                          15.7   10.67 )-----------( 594      ( 2024 19:50 )             45.9           142  |  104  |  10.4  ----------------------------<  95  4.3   |  25.0  |  0.58    Ca    9.4      2024 19:50    TPro  7.7  /  Alb  4.1  /  TBili  <0.2<L>  /  DBili  x   /  AST  17  /  ALT  16  /  AlkPhos  118      LIVER FUNCTIONS - ( 2024 19:50 )  Alb: 4.1 g/dL / Pro: 7.7 g/dL / ALK PHOS: 118 U/L / ALT: 16 U/L / AST: 17 U/L / GGT: x           Urinalysis Basic - ( 2024 19:50 )    Color: x / Appearance: x / SG: x / pH: x  Gluc: 95 mg/dL / Ketone: x  / Bili: x / Urobili: x   Blood: x / Protein: x / Nitrite: x   Leuk Esterase: x / RBC: x / WBC x   Sq Epi: x / Non Sq Epi: x / Bacteria: x      HCG Quantitative, Serum: <4.0 mIU/mL (24 @ 19:50)    < from: US Abdomen Upper Quadrant Right (24 @ 20:34) >  FINDINGS:  Liver: Within normal limits.  Bile ducts: Normal caliber. Common bile duct measures 3 mm.  Gallbladder: Within normal limits.  Pancreas: Visualized portions are grossly within normal limits.  Right kidney: 10.9 cm. No hydronephrosis.  Ascites: None.  IVC: Visualized portions are within normal limits.    IMPRESSION:  No acute findings.    < end of copied text >       ADRIAN OHARA is a 32yo  s/p repeat C/S, right cystectomy and bilateral salpingectomy on  who presents with epigastric pain, worse with eating. Pt also reports constipation, states shes had on two small bowel movements since her  and tired taking stool softeners today. Pain is unrelieved by OTC pain medications. Reports nausea but no vomiting. Subjective fevers at home. She denies any dizziness, CP, SOB, HA, excessive bleeding but reports malodorous discharge.      ROS:  Gen: no fatigue  CV: no chest pain  Resp: no SOB, wheezing  Neuro: no vision change, headache  GI: no diarrhea  : no dysuria, increased frequency, urge  Gyn: no vaginal bleeding, abnormal discharge  ID: no chills  Int: no rash  MSK: no weakness      POBhx: c/s 40w  for failure to descend, rCS  at 35 weeks for NRFHT, rCS , Repeat  Section   PGYN: -fibroids/-cysts, denied STD hx, denies abnormal PAPs  PMH: asthma  PSH: 4 c-sections, BS, right cystectomy   SH: Denies tobacco use, EtOH use and illicit drug use during the pregnancy; Feels safe at home  Meds: prn albuterol, pnv  All: nkda    Height (cm): 165.1 (24 @ 18:00)  Weight (kg): 92.1 (24 @ 18:00)  BMI (kg/m2): 33.8 (24 @ 18:00)  BSA (m2): 1.99 (24 @ 18:00)  T(C): 37.1 (24 @ 18:00), Max: 37.1 (24 @ 18:00)  HR: 80 (24 @ 00:19) (80 - 99)  BP: 115/78 (24 @ 00:19) (115/78 - 118/79)  RR: 18 (24 @ 18:00) (18 - 18)  SpO2: 100% (24 @ 18:00) (100% - 100%)    Physical Exam:  Gen: alert oriented, tearful, uncomfortable appearing  Pulm: normal respiratory effort on RA  Abd: soft, tender to palpation right epigastric, non distended, well healed Pfannenstiel incision, fundus firm below umbilicus  Gyn: Deferred   Msk: no erythema or swelling                          15.7   10.67 )-----------( 594      ( 2024 19:50 )             45.9           142  |  104  |  10.4  ----------------------------<  95  4.3   |  25.0  |  0.58    Ca    9.4      2024 19:50    TPro  7.7  /  Alb  4.1  /  TBili  <0.2<L>  /  DBili  x   /  AST  17  /  ALT  16  /  AlkPhos  118      LIVER FUNCTIONS - ( 2024 19:50 )  Alb: 4.1 g/dL / Pro: 7.7 g/dL / ALK PHOS: 118 U/L / ALT: 16 U/L / AST: 17 U/L / GGT: x           Urinalysis Basic - ( 2024 19:50 )    Color: x / Appearance: x / SG: x / pH: x  Gluc: 95 mg/dL / Ketone: x  / Bili: x / Urobili: x   Blood: x / Protein: x / Nitrite: x   Leuk Esterase: x / RBC: x / WBC x   Sq Epi: x / Non Sq Epi: x / Bacteria: x      HCG Quantitative, Serum: <4.0 mIU/mL (24 @ 19:50)    < from: US Abdomen Upper Quadrant Right (24 @ 20:34) >  FINDINGS:  Liver: Within normal limits.  Bile ducts: Normal caliber. Common bile duct measures 3 mm.  Gallbladder: Within normal limits.  Pancreas: Visualized portions are grossly within normal limits.  Right kidney: 10.9 cm. No hydronephrosis.  Ascites: None.  IVC: Visualized portions are within normal limits.    IMPRESSION:  No acute findings.    < end of copied text >

## 2024-02-19 LAB
ANION GAP SERPL CALC-SCNC: 14 MMOL/L — SIGNIFICANT CHANGE UP (ref 5–17)
BUN SERPL-MCNC: 10.4 MG/DL — SIGNIFICANT CHANGE UP (ref 8–20)
CALCIUM SERPL-MCNC: 9 MG/DL — SIGNIFICANT CHANGE UP (ref 8.4–10.5)
CHLORIDE SERPL-SCNC: 102 MMOL/L — SIGNIFICANT CHANGE UP (ref 96–108)
CO2 SERPL-SCNC: 24 MMOL/L — SIGNIFICANT CHANGE UP (ref 22–29)
CREAT SERPL-MCNC: 0.65 MG/DL — SIGNIFICANT CHANGE UP (ref 0.5–1.3)
EGFR: 118 ML/MIN/1.73M2 — SIGNIFICANT CHANGE UP
GLUCOSE SERPL-MCNC: 78 MG/DL — SIGNIFICANT CHANGE UP (ref 70–99)
HCT VFR BLD CALC: 41 % — SIGNIFICANT CHANGE UP (ref 34.5–45)
HGB BLD-MCNC: 13.5 G/DL — SIGNIFICANT CHANGE UP (ref 11.5–15.5)
MAGNESIUM SERPL-MCNC: 1.9 MG/DL — SIGNIFICANT CHANGE UP (ref 1.6–2.6)
MCHC RBC-ENTMCNC: 29.3 PG — SIGNIFICANT CHANGE UP (ref 27–34)
MCHC RBC-ENTMCNC: 32.9 GM/DL — SIGNIFICANT CHANGE UP (ref 32–36)
MCV RBC AUTO: 89.1 FL — SIGNIFICANT CHANGE UP (ref 80–100)
PHOSPHATE SERPL-MCNC: 3.6 MG/DL — SIGNIFICANT CHANGE UP (ref 2.4–4.7)
PLATELET # BLD AUTO: 531 K/UL — HIGH (ref 150–400)
POTASSIUM SERPL-MCNC: 4.3 MMOL/L — SIGNIFICANT CHANGE UP (ref 3.5–5.3)
POTASSIUM SERPL-SCNC: 4.3 MMOL/L — SIGNIFICANT CHANGE UP (ref 3.5–5.3)
RBC # BLD: 4.6 M/UL — SIGNIFICANT CHANGE UP (ref 3.8–5.2)
RBC # FLD: 12.3 % — SIGNIFICANT CHANGE UP (ref 10.3–14.5)
SODIUM SERPL-SCNC: 140 MMOL/L — SIGNIFICANT CHANGE UP (ref 135–145)
WBC # BLD: 8.22 K/UL — SIGNIFICANT CHANGE UP (ref 3.8–10.5)
WBC # FLD AUTO: 8.22 K/UL — SIGNIFICANT CHANGE UP (ref 3.8–10.5)

## 2024-02-19 RX ORDER — MINERAL OIL
133 OIL (ML) MISCELLANEOUS ONCE
Refills: 0 | Status: COMPLETED | OUTPATIENT
Start: 2024-02-19 | End: 2024-02-19

## 2024-02-19 RX ORDER — LACTULOSE 10 G/15ML
40 SOLUTION ORAL ONCE
Refills: 0 | Status: COMPLETED | OUTPATIENT
Start: 2024-02-19 | End: 2024-02-19

## 2024-02-19 RX ORDER — IBUPROFEN 200 MG
600 TABLET ORAL EVERY 6 HOURS
Refills: 0 | Status: DISCONTINUED | OUTPATIENT
Start: 2024-02-19 | End: 2024-02-19

## 2024-02-19 RX ORDER — PIPERACILLIN AND TAZOBACTAM 4; .5 G/20ML; G/20ML
3.38 INJECTION, POWDER, LYOPHILIZED, FOR SOLUTION INTRAVENOUS EVERY 8 HOURS
Refills: 0 | Status: DISCONTINUED | OUTPATIENT
Start: 2024-02-19 | End: 2024-02-20

## 2024-02-19 RX ORDER — ONDANSETRON 8 MG/1
4 TABLET, FILM COATED ORAL EVERY 6 HOURS
Refills: 0 | Status: DISCONTINUED | OUTPATIENT
Start: 2024-02-19 | End: 2024-02-22

## 2024-02-19 RX ORDER — MINERAL OIL
133 OIL (ML) MISCELLANEOUS ONCE
Refills: 0 | Status: DISCONTINUED | OUTPATIENT
Start: 2024-02-19 | End: 2024-02-22

## 2024-02-19 RX ORDER — FAMOTIDINE 10 MG/ML
20 INJECTION INTRAVENOUS
Refills: 0 | Status: DISCONTINUED | OUTPATIENT
Start: 2024-02-19 | End: 2024-02-22

## 2024-02-19 RX ORDER — KETOROLAC TROMETHAMINE 30 MG/ML
30 SYRINGE (ML) INJECTION EVERY 6 HOURS
Refills: 0 | Status: DISCONTINUED | OUTPATIENT
Start: 2024-02-19 | End: 2024-02-20

## 2024-02-19 RX ORDER — MAGNESIUM HYDROXIDE 400 MG/1
60 TABLET, CHEWABLE ORAL ONCE
Refills: 0 | Status: COMPLETED | OUTPATIENT
Start: 2024-02-19 | End: 2024-02-19

## 2024-02-19 RX ADMIN — FAMOTIDINE 20 MILLIGRAM(S): 10 INJECTION INTRAVENOUS at 10:41

## 2024-02-19 RX ADMIN — POLYETHYLENE GLYCOL 3350 17 GRAM(S): 17 POWDER, FOR SOLUTION ORAL at 13:13

## 2024-02-19 RX ADMIN — Medication 1 ENEMA: at 10:55

## 2024-02-19 RX ADMIN — Medication 975 MILLIGRAM(S): at 23:08

## 2024-02-19 RX ADMIN — Medication 30 MILLIGRAM(S): at 11:00

## 2024-02-19 RX ADMIN — Medication 975 MILLIGRAM(S): at 18:22

## 2024-02-19 RX ADMIN — MAGNESIUM HYDROXIDE 60 MILLILITER(S): 400 TABLET, CHEWABLE ORAL at 16:30

## 2024-02-19 RX ADMIN — Medication 30 MILLIGRAM(S): at 21:04

## 2024-02-19 RX ADMIN — Medication 133 MILLILITER(S): at 12:53

## 2024-02-19 RX ADMIN — Medication 975 MILLIGRAM(S): at 12:52

## 2024-02-19 RX ADMIN — Medication 30 MILLIGRAM(S): at 10:40

## 2024-02-19 RX ADMIN — Medication 975 MILLIGRAM(S): at 11:53

## 2024-02-19 RX ADMIN — ONDANSETRON 4 MILLIGRAM(S): 8 TABLET, FILM COATED ORAL at 14:58

## 2024-02-19 RX ADMIN — Medication 30 MILLIGRAM(S): at 16:31

## 2024-02-19 RX ADMIN — Medication 30 MILLIGRAM(S): at 17:00

## 2024-02-19 RX ADMIN — FAMOTIDINE 20 MILLIGRAM(S): 10 INJECTION INTRAVENOUS at 21:03

## 2024-02-19 RX ADMIN — PIPERACILLIN AND TAZOBACTAM 25 GRAM(S): 4; .5 INJECTION, POWDER, LYOPHILIZED, FOR SOLUTION INTRAVENOUS at 21:03

## 2024-02-19 RX ADMIN — LACTULOSE 40 GRAM(S): 10 SOLUTION ORAL at 17:53

## 2024-02-19 RX ADMIN — Medication 975 MILLIGRAM(S): at 05:23

## 2024-02-19 RX ADMIN — Medication 975 MILLIGRAM(S): at 17:52

## 2024-02-19 NOTE — PROGRESS NOTE ADULT - SUBJECTIVE AND OBJECTIVE BOX
ADRIAN OHARA is a 33y  POD 17 s/p repeat C/S, right cystectomy and bilateral salpingectomy on  HD# 2  admitted with abdominal pain and constipation    S:    The patient is doing well, reports her pain is improved today but is still feeling nauseous  Pt reports she had multiple episodes of diarrhea yesterday, did not pass any stool overnight.   Tolerating PO intake. Ambulating without difficulty.     O:   T(C): 36.7 (24 @ 05:00), Max: 36.9 (24 @ 08:07)  HR: 60 (24 @ 05:00) (57 - 62)  BP: 119/79 (24 @ 05:00) (92/61 - 119/79)  RR: 18 (24 @ 05:00) (16 - 18)  SpO2: 99% (24 @ 05:00) (96% - 99%)    Gen: NAD, AOx3  Pulm: CTAB  Abdomen: Soft, mildly tender in epigastric and RLQ, non-distended. No rebound or guarding  Extrem: No calf tenderness, no edema     Labs:                           14.7   9.52  )-----------( 548      ( 2024 07:07 )             43.4       24 @ 07:01  -  24 @ 06:45  --------------------------------------------------------  IN: 1375 mL / OUT: 0 mL / NET: 1375 mL            A/P  33y  POD 17 s/p repeat C/S, right cystectomy and bilateral salpingectomy on  HD# 2  admitted with abdominal pain and constipation    Gen: VSS  GI: Patient tolerating regular diet, had large volume stool yesterday with some improvement in pain. Continue bowel regimen at this time, will give zofran for nausea  Heme: Hgb 15.7 > 14.7  ID: Afebrile, s/p zosyn x24 hours with some improvement in pain.   DVT ppx: Ambulation encouraged, SCDs when not ambulating  Dispo: Pending attending rounds this AM     ADRIAN OHARA is a 33y  POD 17 s/p repeat C/S, right cystectomy and bilateral salpingectomy on  HD# 2  admitted with abdominal pain and constipation    S:    The patient is doing well, reports her pain is improved today but is still feeling nauseous  Pt reports she had multiple episodes of diarrhea yesterday, did not pass any stool overnight.   Tolerating PO intake. Ambulating without difficulty.     O:   T(C): 36.7 (24 @ 05:00), Max: 36.9 (24 @ 08:07)  HR: 60 (24 @ 05:00) (57 - 62)  BP: 119/79 (24 @ 05:00) (92/61 - 119/79)  RR: 18 (24 @ 05:00) (16 - 18)  SpO2: 99% (24 @ 05:00) (96% - 99%)    Gen: NAD, AOx3  Pulm: CTAB  Abdomen: Soft, mildly tender in epigastric and RLQ, non-distended. No rebound or guarding  Extrem: No calf tenderness, no edema     Labs:                           14.7   9.52  )-----------( 548      ( 2024 07:07 )             43.4       24 @ 07:01  -  24 @ 06:45  --------------------------------------------------------  IN: 1375 mL / OUT: 0 mL / NET: 1375 mL

## 2024-02-19 NOTE — CHART NOTE - NSCHARTNOTEFT_GEN_A_CORE
Pt reevaluated s/p 2 saline enema's, 1 small BM. Vomiting after lunch. Vaginal and rectal exam performed, no stool in the rectum.   CT a/p reviewed with radiology- confirmed no SBO. Will try milk of magnesium and lactulose. If no relief of pain and constipation will consider GI consult.     Dr. Pagan present at bedside

## 2024-02-19 NOTE — CHART NOTE - NSCHARTNOTEFT_GEN_A_CORE
ADRIAN OHARA is a 33y  POD 17 s/p repeat C/S, right cystectomy and bilateral salpingectomy on  HD# 2  admitted with abdominal pain and constipation    Pt evaluated 2/2 abdominal pain. Pt reporting cramping and pain associated with peristalsis, and associated acid reflux pain. Tearful on evaluation. Has been on bowel regimen with mag citrate since the time of admission, only one watery stool.   She denies any nausea or vomiting, or blood in the stool. Given symptoms- pain likely 2/2 stool burden with diarrhea around the impacted stool.     Plan:  Fleet enema   Standing pepcid   IV toradol q 6 for pain, with tylenol alternating     Will reassess for pain improvement     Dr. Pagan present at bedside

## 2024-02-19 NOTE — DISCHARGE NOTE OB - NS MD DC FALL RISK RISK
For information on Fall & Injury Prevention, visit: [email or web address] OR  [email or web address] OR  https://www.cdc.gov/steadi/patient.html

## 2024-02-19 NOTE — PROGRESS NOTE ADULT - SUBJECTIVE AND OBJECTIVE BOX
32yo s/p POD#    S: Patient was seen and examined at bedside.   The patient is doing well.   Pain is controlled.   Tolerating regular diet, denies N/V.   Briscoe has been removed and patient has been urinating.   Patient has been ambulating.   +flatus/-BM.    O:   T(C): 36.7 (02-19-24 @ 05:00), Max: 36.9 (02-18-24 @ 08:07)  HR: 60 (02-19-24 @ 05:00) (57 - 62)  BP: 119/79 (02-19-24 @ 05:00) (92/61 - 119/79)  RR: 18 (02-19-24 @ 05:00) (16 - 18)  SpO2: 99% (02-19-24 @ 05:00) (96% - 99%)    CV: RRR   Lungs: CTABL  Abdomen: soft, nontender, + BS . Soft, no rebound tenderness.  Incision: dressing removed, clean dry and intact  BM positive and loose stools.  Ext: no edema, erythema or pain        A/P   32yo s/p POD#   -Continue post operative care  -Encourage ambulation and incentive spirometry   -Continue home medications  -: voiding spontaneously  -GI: -BM/+flatus   -DVT ppx: SCDs, ambulating  -D/C planning as per attending / Patient discharge today in satisfactory condition.

## 2024-02-19 NOTE — DISCHARGE NOTE OB - PATIENT PORTAL LINK FT
You can access the FollowMyHealth Patient Portal offered by [facility] by registering at the following website: [email or web address]. By joining [employer]’s FollowMyHealth portal, you will also be able to view your health information using other applications (apps) compatible with our system.

## 2024-02-20 PROCEDURE — 99222 1ST HOSP IP/OBS MODERATE 55: CPT

## 2024-02-20 RX ORDER — KETOROLAC TROMETHAMINE 30 MG/ML
30 SYRINGE (ML) INJECTION EVERY 6 HOURS
Refills: 0 | Status: DISCONTINUED | OUTPATIENT
Start: 2024-02-20 | End: 2024-02-22

## 2024-02-20 RX ORDER — CITRIC ACID/SODIUM CITRATE 300-500 MG
30 SOLUTION, ORAL ORAL ONCE
Refills: 0 | Status: COMPLETED | OUTPATIENT
Start: 2024-02-20 | End: 2024-02-20

## 2024-02-20 RX ADMIN — Medication 30 MILLIGRAM(S): at 03:38

## 2024-02-20 RX ADMIN — FAMOTIDINE 20 MILLIGRAM(S): 10 INJECTION INTRAVENOUS at 08:03

## 2024-02-20 RX ADMIN — SENNA PLUS 2 TABLET(S): 8.6 TABLET ORAL at 21:00

## 2024-02-20 RX ADMIN — Medication 975 MILLIGRAM(S): at 13:37

## 2024-02-20 RX ADMIN — FAMOTIDINE 20 MILLIGRAM(S): 10 INJECTION INTRAVENOUS at 20:21

## 2024-02-20 RX ADMIN — Medication 975 MILLIGRAM(S): at 05:02

## 2024-02-20 RX ADMIN — PIPERACILLIN AND TAZOBACTAM 25 GRAM(S): 4; .5 INJECTION, POWDER, LYOPHILIZED, FOR SOLUTION INTRAVENOUS at 05:01

## 2024-02-20 RX ADMIN — Medication 30 MILLIGRAM(S): at 19:16

## 2024-02-20 RX ADMIN — PIPERACILLIN AND TAZOBACTAM 25 GRAM(S): 4; .5 INJECTION, POWDER, LYOPHILIZED, FOR SOLUTION INTRAVENOUS at 14:43

## 2024-02-20 RX ADMIN — POLYETHYLENE GLYCOL 3350 17 GRAM(S): 17 POWDER, FOR SOLUTION ORAL at 14:08

## 2024-02-20 RX ADMIN — ONDANSETRON 4 MILLIGRAM(S): 8 TABLET, FILM COATED ORAL at 14:40

## 2024-02-20 RX ADMIN — Medication 975 MILLIGRAM(S): at 14:37

## 2024-02-20 RX ADMIN — Medication 30 MILLILITER(S): at 10:02

## 2024-02-20 RX ADMIN — Medication 975 MILLIGRAM(S): at 20:21

## 2024-02-20 NOTE — CONSULT NOTE ADULT - TIME BILLING
Reviewing CT and US images and lab work in order to formulate the above assessment and management plan.

## 2024-02-20 NOTE — CONSULT NOTE ADULT - ASSESSMENT
34yo  s/p repeat C/S, right cystectomy and bilateral salpingectomy on  presented to ED with constipation and epigastric pain.     Epigastric pain:  Constipation:    CT abd/ US abd unremarkable, no evidence of gall stones or pancreatitis. No CBC dilation. Normal liver chemistry  Epigastric pain likely from esophagitis, PUD  Avoid NSAIDs  Cont bowel regimen, reports BM this morning,   Add Protonix 40 mg IV BID  Cont Pepcid BID   diet as tolerated  Please keep NPO after midnight for EGD tomorrow  Active type and screen, AM labs  Plan d/w the patient

## 2024-02-20 NOTE — PROGRESS NOTE ADULT - SUBJECTIVE AND OBJECTIVE BOX
ADRIAN OHARA is a 33y  POD 18 s/p repeat C/S, right cystectomy and bilateral salpingectomy on  HD# 3  admitted with abdominal pain and constipation    S:    The patient in significant discomfort today, sitting on side of bed complaining of pain when she moves. Patient received toradol which helped her pain for a short amount of time overnight  Pt reports she had diarrhea with occasional solid stool overnight  Tolerating PO intake with nausea. Ambulating without difficulty.     O:   Vital Signs Last 24 Hrs  T(C): 36.6 (2024 04:02), Max: 36.9 (2024 20:10)  T(F): 97.8 (2024 04:02), Max: 98.5 (2024 20:10)  HR: 56 (2024 04:02) (56 - 59)  BP: 119/73 (2024 04:02) (113/76 - 129/84)  BP(mean): --  RR: 20 (2024 04:02) (18 - 20)  SpO2: 99% (2024 04:02) (97% - 99%)      Gen: NAD, AOx3  Pulm: CTAB  Abdomen: Soft, mildly tender in epigastric and RLQ, non-distended. No rebound or guarding  Extrem: No calf tenderness, no edema     Labs:                           14.7   9.52  )-----------( 548      ( 2024 07:07 )             43.4                             13.5   8.22  )-----------( 531      ( 2024 05:34 )             41.0       24 @ 07:01  -  - @ 06:45  --------------------------------------------------------  IN: 1375 mL / OUT: 0 mL / NET: 1375 mL

## 2024-02-21 LAB
ALBUMIN SERPL ELPH-MCNC: 3.4 G/DL — SIGNIFICANT CHANGE UP (ref 3.3–5.2)
ALP SERPL-CCNC: 96 U/L — SIGNIFICANT CHANGE UP (ref 40–120)
ALT FLD-CCNC: 11 U/L — SIGNIFICANT CHANGE UP
ANION GAP SERPL CALC-SCNC: 12 MMOL/L — SIGNIFICANT CHANGE UP (ref 5–17)
AST SERPL-CCNC: 10 U/L — SIGNIFICANT CHANGE UP
BASOPHILS # BLD AUTO: 0.02 K/UL — SIGNIFICANT CHANGE UP (ref 0–0.2)
BASOPHILS NFR BLD AUTO: 0.2 % — SIGNIFICANT CHANGE UP (ref 0–2)
BILIRUB SERPL-MCNC: 0.2 MG/DL — LOW (ref 0.4–2)
BLD GP AB SCN SERPL QL: SIGNIFICANT CHANGE UP
BUN SERPL-MCNC: 6.2 MG/DL — LOW (ref 8–20)
CALCIUM SERPL-MCNC: 8.9 MG/DL — SIGNIFICANT CHANGE UP (ref 8.4–10.5)
CHLORIDE SERPL-SCNC: 107 MMOL/L — SIGNIFICANT CHANGE UP (ref 96–108)
CO2 SERPL-SCNC: 24 MMOL/L — SIGNIFICANT CHANGE UP (ref 22–29)
CREAT SERPL-MCNC: 0.73 MG/DL — SIGNIFICANT CHANGE UP (ref 0.5–1.3)
EGFR: 111 ML/MIN/1.73M2 — SIGNIFICANT CHANGE UP
EOSINOPHIL # BLD AUTO: 0.11 K/UL — SIGNIFICANT CHANGE UP (ref 0–0.5)
EOSINOPHIL NFR BLD AUTO: 1.3 % — SIGNIFICANT CHANGE UP (ref 0–6)
GLUCOSE SERPL-MCNC: 91 MG/DL — SIGNIFICANT CHANGE UP (ref 70–99)
HCT VFR BLD CALC: 38 % — SIGNIFICANT CHANGE UP (ref 34.5–45)
HGB BLD-MCNC: 12.6 G/DL — SIGNIFICANT CHANGE UP (ref 11.5–15.5)
IMM GRANULOCYTES NFR BLD AUTO: 0.2 % — SIGNIFICANT CHANGE UP (ref 0–0.9)
INR BLD: 1.08 RATIO — SIGNIFICANT CHANGE UP (ref 0.85–1.18)
LYMPHOCYTES # BLD AUTO: 2.07 K/UL — SIGNIFICANT CHANGE UP (ref 1–3.3)
LYMPHOCYTES # BLD AUTO: 24.3 % — SIGNIFICANT CHANGE UP (ref 13–44)
MCHC RBC-ENTMCNC: 29.4 PG — SIGNIFICANT CHANGE UP (ref 27–34)
MCHC RBC-ENTMCNC: 33.2 GM/DL — SIGNIFICANT CHANGE UP (ref 32–36)
MCV RBC AUTO: 88.6 FL — SIGNIFICANT CHANGE UP (ref 80–100)
MONOCYTES # BLD AUTO: 0.48 K/UL — SIGNIFICANT CHANGE UP (ref 0–0.9)
MONOCYTES NFR BLD AUTO: 5.6 % — SIGNIFICANT CHANGE UP (ref 2–14)
NEUTROPHILS # BLD AUTO: 5.83 K/UL — SIGNIFICANT CHANGE UP (ref 1.8–7.4)
NEUTROPHILS NFR BLD AUTO: 68.4 % — SIGNIFICANT CHANGE UP (ref 43–77)
PLATELET # BLD AUTO: 497 K/UL — HIGH (ref 150–400)
POTASSIUM SERPL-MCNC: 4.8 MMOL/L — SIGNIFICANT CHANGE UP (ref 3.5–5.3)
POTASSIUM SERPL-SCNC: 4.8 MMOL/L — SIGNIFICANT CHANGE UP (ref 3.5–5.3)
PROT SERPL-MCNC: 6.5 G/DL — LOW (ref 6.6–8.7)
PROTHROM AB SERPL-ACNC: 12 SEC — SIGNIFICANT CHANGE UP (ref 9.5–13)
RBC # BLD: 4.29 M/UL — SIGNIFICANT CHANGE UP (ref 3.8–5.2)
RBC # FLD: 12.3 % — SIGNIFICANT CHANGE UP (ref 10.3–14.5)
SODIUM SERPL-SCNC: 142 MMOL/L — SIGNIFICANT CHANGE UP (ref 135–145)
WBC # BLD: 8.53 K/UL — SIGNIFICANT CHANGE UP (ref 3.8–10.5)
WBC # FLD AUTO: 8.53 K/UL — SIGNIFICANT CHANGE UP (ref 3.8–10.5)

## 2024-02-21 RX ORDER — MAGNESIUM HYDROXIDE 400 MG/1
60 TABLET, CHEWABLE ORAL ONCE
Refills: 0 | Status: COMPLETED | OUTPATIENT
Start: 2024-02-21 | End: 2024-02-21

## 2024-02-21 RX ADMIN — Medication 975 MILLIGRAM(S): at 05:03

## 2024-02-21 RX ADMIN — FAMOTIDINE 20 MILLIGRAM(S): 10 INJECTION INTRAVENOUS at 17:38

## 2024-02-21 RX ADMIN — Medication 975 MILLIGRAM(S): at 23:06

## 2024-02-21 RX ADMIN — Medication 975 MILLIGRAM(S): at 16:12

## 2024-02-21 RX ADMIN — Medication 30 MILLIGRAM(S): at 18:30

## 2024-02-21 RX ADMIN — SODIUM CHLORIDE 125 MILLILITER(S): 9 INJECTION, SOLUTION INTRAVENOUS at 23:06

## 2024-02-21 RX ADMIN — SENNA PLUS 2 TABLET(S): 8.6 TABLET ORAL at 22:18

## 2024-02-21 RX ADMIN — Medication 30 MILLIGRAM(S): at 18:45

## 2024-02-21 RX ADMIN — MAGNESIUM HYDROXIDE 60 MILLILITER(S): 400 TABLET, CHEWABLE ORAL at 23:06

## 2024-02-21 NOTE — PROGRESS NOTE ADULT - ATTENDING COMMENTS
POD#21  HD 5  Doing well  NPO after midnight for EGD today  Still with epigastric pain after PO consumption  Addendum  EGD rescheduled for tomorrow.  Patient had a meal and reports epigastric pain, no n/v  ppalos

## 2024-02-21 NOTE — PROGRESS NOTE ADULT - SUBJECTIVE AND OBJECTIVE BOX
ADRIAN OHARA is a 33y  POD 19 s/p repeat C/S, right cystectomy and bilateral salpingectomy on  HD# 4  admitted with abdominal pain and constipation    S:    The patient has no complaints today, feeling better today  Pt reports she has been passing gas  Currently NPO for endoscopy today, denies N/V today.  Ambulating without difficulty.     O:   Vital Signs Last 24 Hrs  T(C): 36.9 (2024 05:00), Max: 36.9 (2024 05:00)  T(F): 98.5 (2024 05:00), Max: 98.5 (2024 05:00)  HR: 53 (2024 05:00) (53 - 59)  BP: 133/84 (2024 05:00) (114/78 - 137/87)  BP(mean): --  RR: 18 (2024 05:00) (18 - 18)  SpO2: 98% (2024 05:00) (98% - 100%)    Parameters below as of 2024 05:00  Patient On (Oxygen Delivery Method): room air    Gen: NAD, AOx3  Pulm: CTAB  Abdomen: Soft, mildly tender in epigastric and RLQ, non-distended. No rebound or guarding  Extrem: No calf tenderness, no edema     Labs:                               14.7   9.52  )-----------( 548      ( 2024 07:07 )             43.4                             13.5   8.22  )-----------( 531      ( 2024 05:34 )             41.0       24 @ 07:01  -  24 @ 06:45  --------------------------------------------------------  IN: 1375 mL / OUT: 0 mL / NET: 1375 mL

## 2024-02-22 ENCOUNTER — RESULT REVIEW (OUTPATIENT)
Age: 34
End: 2024-02-22

## 2024-02-22 ENCOUNTER — TRANSCRIPTION ENCOUNTER (OUTPATIENT)
Age: 34
End: 2024-02-22

## 2024-02-22 VITALS
TEMPERATURE: 98 F | DIASTOLIC BLOOD PRESSURE: 85 MMHG | RESPIRATION RATE: 16 BRPM | OXYGEN SATURATION: 98 % | SYSTOLIC BLOOD PRESSURE: 128 MMHG | HEART RATE: 56 BPM

## 2024-02-22 PROCEDURE — 76705 ECHO EXAM OF ABDOMEN: CPT

## 2024-02-22 PROCEDURE — 88305 TISSUE EXAM BY PATHOLOGIST: CPT

## 2024-02-22 PROCEDURE — 99285 EMERGENCY DEPT VISIT HI MDM: CPT

## 2024-02-22 PROCEDURE — 43239 EGD BIOPSY SINGLE/MULTIPLE: CPT

## 2024-02-22 PROCEDURE — 83735 ASSAY OF MAGNESIUM: CPT

## 2024-02-22 PROCEDURE — 80053 COMPREHEN METABOLIC PANEL: CPT

## 2024-02-22 PROCEDURE — 85730 THROMBOPLASTIN TIME PARTIAL: CPT

## 2024-02-22 PROCEDURE — 74177 CT ABD & PELVIS W/CONTRAST: CPT | Mod: MD

## 2024-02-22 PROCEDURE — 85027 COMPLETE CBC AUTOMATED: CPT

## 2024-02-22 PROCEDURE — 84100 ASSAY OF PHOSPHORUS: CPT

## 2024-02-22 PROCEDURE — 88342 IMHCHEM/IMCYTCHM 1ST ANTB: CPT | Mod: 26

## 2024-02-22 PROCEDURE — 96375 TX/PRO/DX INJ NEW DRUG ADDON: CPT

## 2024-02-22 PROCEDURE — 96376 TX/PRO/DX INJ SAME DRUG ADON: CPT

## 2024-02-22 PROCEDURE — 36415 COLL VENOUS BLD VENIPUNCTURE: CPT

## 2024-02-22 PROCEDURE — 83690 ASSAY OF LIPASE: CPT

## 2024-02-22 PROCEDURE — 84702 CHORIONIC GONADOTROPIN TEST: CPT

## 2024-02-22 PROCEDURE — 86900 BLOOD TYPING SEROLOGIC ABO: CPT

## 2024-02-22 PROCEDURE — 80048 BASIC METABOLIC PNL TOTAL CA: CPT

## 2024-02-22 PROCEDURE — 85610 PROTHROMBIN TIME: CPT

## 2024-02-22 PROCEDURE — 86850 RBC ANTIBODY SCREEN: CPT

## 2024-02-22 PROCEDURE — 86901 BLOOD TYPING SEROLOGIC RH(D): CPT

## 2024-02-22 PROCEDURE — T1013: CPT

## 2024-02-22 PROCEDURE — 88342 IMHCHEM/IMCYTCHM 1ST ANTB: CPT

## 2024-02-22 PROCEDURE — 85025 COMPLETE CBC W/AUTO DIFF WBC: CPT

## 2024-02-22 PROCEDURE — 96374 THER/PROPH/DIAG INJ IV PUSH: CPT

## 2024-02-22 PROCEDURE — 88305 TISSUE EXAM BY PATHOLOGIST: CPT | Mod: 26

## 2024-02-22 RX ORDER — PANTOPRAZOLE SODIUM 20 MG/1
1 TABLET, DELAYED RELEASE ORAL
Qty: 60 | Refills: 0
Start: 2024-02-22 | End: 2024-03-22

## 2024-02-22 RX ORDER — PANTOPRAZOLE SODIUM 20 MG/1
40 TABLET, DELAYED RELEASE ORAL
Refills: 0 | Status: DISCONTINUED | OUTPATIENT
Start: 2024-02-22 | End: 2024-02-22

## 2024-02-22 RX ADMIN — Medication 975 MILLIGRAM(S): at 05:31

## 2024-02-22 RX ADMIN — PANTOPRAZOLE SODIUM 40 MILLIGRAM(S): 20 TABLET, DELAYED RELEASE ORAL at 14:59

## 2024-02-22 RX ADMIN — Medication 975 MILLIGRAM(S): at 11:17

## 2024-02-22 NOTE — DISCHARGE NOTE PROVIDER - NSDCCPCAREPLAN_GEN_ALL_CORE_FT
PRINCIPAL DISCHARGE DIAGNOSIS  Diagnosis: Erosive esophagitis  Assessment and Plan of Treatment:       SECONDARY DISCHARGE DIAGNOSES  Diagnosis: Constipation  Assessment and Plan of Treatment:

## 2024-02-22 NOTE — PROGRESS NOTE ADULT - SUBJECTIVE AND OBJECTIVE BOX
ADRIAN OHARA is a 33y  POD 20 s/p repeat C/S, right cystectomy and bilateral salpingectomy on  HD# 5  admitted with abdominal pain and constipation    S:    The patient has no complaints today, feeling better today scheduled for EGD today  Pt reports she has been passing gas, was eating yesterday with no nausea/vomiting  Currently NPO for endoscopy today, denies N/V today.  Ambulating without difficulty.     O:   Vital Signs Last 24 Hrs  T(C): 36.6 (2024 04:45), Max: 37.1 (2024 15:52)  T(F): 97.9 (2024 04:45), Max: 98.8 (2024 15:52)  HR: 59 (2024 04:45) (54 - 70)  BP: 119/79 (2024 04:45) (100/68 - 129/86)  BP(mean): --  RR: 16 (2024 04:45) (16 - 16)  SpO2: 98% (2024 04:45) (96% - 100%)    Parameters below as of 2024 15:52  Patient On (Oxygen Delivery Method): room air      Gen: NAD, AOx3  Pulm: CTAB  Abdomen: Soft, mildly tender in epigastric and RLQ, non-distended. No rebound or guarding  Extrem: No calf tenderness, no edema     Labs:                             12.6   8.53  )-----------( 497      ( 2024 07:18 )             38.0                           14.7   9.52  )-----------( 548      ( 2024 07:07 )             43.4                             13.5   8.22  )-----------( 531      ( 2024 05:34 )             41.0       24 @ 07:01  -  24 @ 06:45  --------------------------------------------------------  IN: 1375 mL / OUT: 0 mL / NET: 1375 mL

## 2024-02-22 NOTE — DISCHARGE NOTE PROVIDER - CARE PROVIDER_API CALL
Lizeth Strickland  Obstetrics and Gynecology  [address] [place] [address]  Phone: [phone]  Fax: [phone]  Follow Up Time:     Anil Amaro  Gastroenterology  [address], [address]  [place] [address]  Phone: [phone]  Fax: [phone]  Follow Up Time: 1 month

## 2024-02-22 NOTE — CHART NOTE - NSCHARTNOTEFT_GEN_A_CORE
EGD to D2: LA Grade B distal erosive esophagitis likely causing the cause of her epigastric pain and N/V. Advance diet as tolerated. Pantoprazole 40 mg. BID. OK for discharge home from a GI standpoint on Pantoprazole 40 mg. BID. OPT f/uy in our office in 4 to 6 weeks. signing off. Reconsult as needed. Thank you. EGD to D2: LA Grade B distal erosive esophagitis likely causing the cause of her epigastric pain and N/V. Advance diet as tolerated. Pantoprazole 40 mg. BID. OK for discharge home from a GI standpoint on Pantoprazole 40 mg. BID. OPT f/uy in our office in 4 to 6 weeks. Signing off. Reconsult as needed. Thank you.

## 2024-02-22 NOTE — DISCHARGE NOTE PROVIDER - NSDCMRMEDTOKEN_GEN_ALL_CORE_FT
acetaminophen 325 mg oral tablet: 3 tab(s) orally every 6 hours  ibuprofen 600 mg oral tablet: 1 tab(s) orally every 6 hours  oxyCODONE 5 mg oral tablet: 1 tab(s) orally every 8 hours MDD: 3  Protonix 40 mg oral delayed release tablet: 1 tab(s) orally twice a day after breakfast and dinner

## 2024-02-22 NOTE — DISCHARGE NOTE NURSING/CASE MANAGEMENT/SOCIAL WORK - PATIENT PORTAL LINK FT
You can access the FollowMyHealth Patient Portal offered by [facility] by registering at the following website: [email or web address]. By joining [employer]’s FollowMyHealth portal, you will also be able to view your health information using other applications (apps) compatible with our system.

## 2024-02-22 NOTE — DISCHARGE NOTE PROVIDER - NSDCFUSCHEDAPPT_GEN_ALL_CORE_FT
Lizeth Strickland  [facility] Preadmit  Scheduled Appointment: 02/26/2024    Lizeth Strickland  [facility] Preadmit  Scheduled Appointment: 02/27/2024

## 2024-02-22 NOTE — PROGRESS NOTE ADULT - REASON FOR ADMISSION
Abdominal pain post 

## 2024-02-22 NOTE — PROGRESS NOTE ADULT - ASSESSMENT
A/P  33y  POD 19 s/p repeat C/S, right cystectomy and bilateral salpingectomy on  HD# 4  admitted with abdominal pain and constipation  Gen: VSS  GI: Patient tolerating regular diet, has had intermittent bowel movements, both solid and liquid with no relief of her pain. Patient s/p dulcolax, mulk of magnesia, senna, mirilax. Constipation relieved, continuing to have significant epigastric pain. CT WNL for post op changes, no sign of obstruction GI to do endoscopy today  for continued epigastric pain, will follow up after procedure  Heme: Hgb 15.7 > 14.7 > 13.5  ID: Afebrile, s/p zosyn x24 hours with some improvement in pain. No sign of infection  DVT ppx: Ambulation encouraged, SCDs when not ambulating  Dispo: Pending GI reccs  
A/P  33y  POD 17 s/p repeat C/S, right cystectomy and bilateral salpingectomy on  HD# 3  admitted with abdominal pain and constipation  Gen: VSS  GI: Patient tolerating regular diet, has had intermittent bowel movements, both solid and liquid with no relief of her pain. Patient s/p dulcolax, mulk of magnesia, senna, mirilax. Continuing to have significant epigastric pain. CT WNL for post op changes, no sign of obstruciton. Will consider consulting GI. Reccs appreciated.  Heme: Hgb 15.7 > 14.7 > 13.5  ID: Afebrile, s/p zosyn x24 hours with some improvement in pain. No sign of infection  DVT ppx: Ambulation encouraged, SCDs when not ambulating  Dispo: Pending attending rounds this AM  
A/P  33y  POD 20 s/p repeat C/S, right cystectomy and bilateral salpingectomy on  HD# 5  admitted with abdominal pain and constipation  Gen: VSS  GI: Patient tolerating regular diet, has had intermittent bowel movements, both solid and liquid with no relief of her pain. Patient s/p dulcolax, mulk of magnesia, senna, mirilax. Constipation relieved, continuing to have significant epigastric pain. CT WNL for post op changes, no sign of obstruction GI to do endoscopy today  for continued epigastric pain, will follow up after procedure  Heme: Hgb 15.7 > 14.7 > 13.5 > 12.6  ID: Afebrile, s/p zosyn x24 hours with some improvement in pain. No sign of infection  DVT ppx: Ambulation encouraged, SCDs when not ambulating  Dispo: Pending GI reccs  
A/P  33y  POD 17 s/p repeat C/S, right cystectomy and bilateral salpingectomy on  HD# 2  admitted with abdominal pain and constipation  Gen: VSS  GI: Patient tolerating regular diet, had large volume stool yesterday with some improvement in pain. Continue bowel regimen at this time, will give zofran for nausea  Heme: Hgb 15.7 > 14.7  ID: Afebrile, s/p zosyn x24 hours with some improvement in pain.   DVT ppx: Ambulation encouraged, SCDs when not ambulating  Dispo: Pending attending rounds this AM

## 2024-02-22 NOTE — DISCHARGE NOTE PROVIDER - HOSPITAL COURSE
Patient seen and managed for constipation and epigastric pain. Patient constipation was relieved with enema and medication, and has since been having normal bowel movements.. Patient was evaluated by GI and an endoscopy was done due to her unrelenting epigastric pain. Patient was found to have distal erosive esophagitis, and was prescribed pantoprazole 40 mg BID. Patient tolerating PO post procedure, ambulating well, and voiding spontaneously. Patient will follow up with GI in 4-6 weeks.

## 2024-02-22 NOTE — DISCHARGE NOTE PROVIDER - CARE PROVIDERS DIRECT ADDRESSES
[email or web address]

## 2024-02-23 LAB — SURGICAL PATHOLOGY STUDY: SIGNIFICANT CHANGE UP

## 2024-03-15 ENCOUNTER — APPOINTMENT (OUTPATIENT)
Dept: GASTROENTEROLOGY | Facility: CLINIC | Age: 34
End: 2024-03-15
Payer: MEDICAID

## 2024-03-15 VITALS
RESPIRATION RATE: 16 BRPM | HEART RATE: 70 BPM | BODY MASS INDEX: 37 KG/M2 | WEIGHT: 196 LBS | HEIGHT: 61 IN | OXYGEN SATURATION: 99 % | DIASTOLIC BLOOD PRESSURE: 82 MMHG | SYSTOLIC BLOOD PRESSURE: 128 MMHG

## 2024-03-15 DIAGNOSIS — Z09 ENCOUNTER FOR FOLLOW-UP EXAMINATION AFTER COMPLETED TREATMENT FOR CONDITIONS OTHER THAN MALIGNANT NEOPLASM: ICD-10-CM

## 2024-03-15 DIAGNOSIS — R10.13 EPIGASTRIC PAIN: ICD-10-CM

## 2024-03-15 DIAGNOSIS — K22.10 ULCER OF ESOPHAGUS W/OUT BLEEDING: ICD-10-CM

## 2024-03-15 PROCEDURE — T1013: CPT

## 2024-03-15 PROCEDURE — 99213 OFFICE O/P EST LOW 20 MIN: CPT | Mod: 25

## 2024-03-15 RX ORDER — PANTOPRAZOLE 40 MG/1
40 TABLET, DELAYED RELEASE ORAL
Qty: 90 | Refills: 1 | Status: ACTIVE | COMMUNITY
Start: 2024-03-15 | End: 1900-01-01

## 2024-03-15 RX ORDER — PRENATAL VIT NO.126/IRON/FOLIC 28MG-0.8MG
28-0.8 TABLET ORAL
Refills: 0 | Status: DISCONTINUED | COMMUNITY
End: 2024-03-15

## 2024-03-15 RX ORDER — ASPIRIN 81 MG
81 TABLET, DELAYED RELEASE (ENTERIC COATED) ORAL
Refills: 0 | Status: DISCONTINUED | COMMUNITY
End: 2024-03-15

## 2024-03-15 RX ORDER — PANTOPRAZOLE SODIUM 40 MG/1
40 GRANULE, DELAYED RELEASE ORAL
Refills: 0 | Status: ACTIVE | COMMUNITY

## 2024-03-16 NOTE — REASON FOR VISIT
[Post Hospitalization] : a post hospitalization visit [[employer] ] : provided by [employer]   [Time Spent: ____ minutes] : Total time spent using  services: [unfilled] minutes. The patient's primary language is not English thus required  services. [Interpreters_IDNumber] : [id number] [FreeTextEntry1] : Epigastric abdominal pain, heartburn, erosive esophagitis on EGD done 2/22. [Interpreters_FullName] : Radha [TWNoteComboBox1] : Cambodian

## 2024-03-16 NOTE — HISTORY OF PRESENT ILLNESS
[FreeTextEntry1] : 33-year-old  female non-English-speaking.   5 para 2.  Recent  done 2024.  Uneventful.  Also had bilateral salpingectomy at that time.  Patient presented to [facility] with epigastric pain radiating to the chest with nausea and vomiting.  Pain fairly diffuse.  No history of hematemesis or melena.  No fever.  No toxicity.  Dyspepsia noted.  Blood work in ER is normal.  LFTs were normal.  Ultrasound abdomen negative for gallstones.  No dilated ducts.  CT abdomen pelvis was negative.  Bladder flap hematoma noted.  Recent  findings.  No abscess.  No hematoma. Initially seen in GI consult by RC.  Initial antibiotics administered.  EGD performed 2024 by MACIEL.  LA class B erosive esophagitis noted.  4 cm of inflammation in the distal esophagus noted.  No Jesus's esophagus.  Stomach and duodenum were normal.  Biopsies obtained for H. pylori were negative.  Esophageal biopsies were negative for Jesus's.  They were consistent with esophageal inflammation from reflux.  Diet was advanced and tolerated.  Patient was discharged home later the same day.  Since discharge home on pantoprazole at 40 mg p.o. twice daily patient notes gradual improvement in abdominal pain.  Still has occasional nausea but no vomiting.  Still has some belching.  GI bleeding.  Overall feeling better.  Has sustained modest weight loss from 200-1 95.  Bowel movements are normal.  No diarrhea.  No hematochezia.  Looks well.  No fever. Reviewed path reports with patient. [de-identified] : 2/22/2024: EGD by MACIEL.  LA class B erosive esophagitis.  Allegedly 4 cm of inflammation noted.  Remainder of stomach and duodenum were normal.  No hiatus hernia.  No description of Jesus's esophagus.  Biopsies of esophagus were consistent with reflux.  No metaplasia.  Gastric biopsies negative.  No H. pylori.

## 2024-03-16 NOTE — ASSESSMENT
[FreeTextEntry1] : Erosive esophagitis.  Biopsies negative for Jesus's esophagus.  Biopsies negative for EOE.  No gastric pathology.  No H. pylori.  No gastrointestinal metaplasia.  Improving symptomatically.  Now 3 weeks post discharge.   Plan decrease PPI medication pantoprazole from 40 mg twice daily to 40 mg p.o. daily; Rx #90.  Refill 1. Maintain antireflux diet.  Diet advised. GI office follow-up here in 6 months.  Sooner if need be.   Okay to follow-up with either RC or FK.

## 2024-03-16 NOTE — PHYSICAL EXAM
[Normal Voice/Communication] : normal voice/communication [Alert] : alert [Healthy Appearing] : healthy appearing [No Acute Distress] : no acute distress [Hearing Threshold Finger Rub Not Stewart] : hearing was normal [Sclera] : the sclera and conjunctiva were normal [Oropharynx] : the oropharynx was normal [Normal Lips/Gums] : the lips and gums were normal [Normal Appearance] : the appearance of the neck was normal [No Respiratory Distress] : no respiratory distress [No Neck Mass] : no neck mass was observed [Auscultation Breath Sounds / Voice Sounds] : lungs were clear to auscultation bilaterally [No Acc Muscle Use] : no accessory muscle use [Respiration, Rhythm And Depth] : normal respiratory rhythm and effort [Heart Rate And Rhythm] : heart rate was normal and rhythm regular [Normal S1, S2] : normal S1 and S2 [Bowel Sounds] : normal bowel sounds [Murmurs] : no murmurs [None] : no edema [Abdomen Tenderness] : non-tender [No Masses] : no abdominal mass palpated [] : no hepatosplenomegaly [Abdomen Soft] : soft [Oriented To Time, Place, And Person] : oriented to person, place, and time [de-identified] : Medium frame and build.  Overweight.  In no distress. [FreeTextEntry1] : Anicteric sclera. [de-identified] : Moist mucosa.  No pharyngitis. [de-identified] : No neck masses or adenopathy. [de-identified] : Clear to A&P. [de-identified] : No MRG. [de-identified] : Flat soft bowel sounds present.  Transverse suprapubic scar noted.  Intact.  No hernias.  No mass tenderness or rebound. [de-identified] : Not performed today.

## 2024-04-24 NOTE — ED ADULT NURSE NOTE - CAS DISCH TRANSFER METHOD
"  Chief Complaint:   Chief Complaint   Patient presents with    ACUTE REACTION TO STRESS     CSA UPDATED TODAY     Insomnia     MED REFILL        Willie Enciso 57 y.o. female who presents today for Medical Management of the below listed issues. She  has a problem list of   Patient Active Problem List   Diagnosis    Hyperlipidemia    Hypothyroidism    Insomnia    Major depressive disorder, recurrent episode, in full remission    Acute reaction to stress    IFG (impaired fasting glucose)   .  Since the last visit, She has overall felt well.  she has been compliant with   Current Outpatient Medications:     ALPRAZolam (XANAX) 0.5 MG tablet, Take 1 tablet by mouth 2 (Two) Times a Day., Disp: 60 tablet, Rfl: 2    zolpidem (AMBIEN) 10 MG tablet, Take 1 tablet by mouth At Night As Needed for Sleep., Disp: 30 tablet, Rfl: 2    fenofibrate micronized (ANTARA) 130 MG capsule, Take 1 capsule by mouth Every Morning Before Breakfast., Disp: 30 capsule, Rfl: 5    FLUoxetine (PROzac) 20 MG capsule, Take 3 capsules by mouth Daily., Disp: 90 capsule, Rfl: 5    levothyroxine (Synthroid) 50 MCG tablet, Take 1 tablet by mouth Daily., Disp: 30 tablet, Rfl: 5    liothyronine (Cytomel) 5 MCG tablet, Take 1 tablet by mouth Daily., Disp: 30 tablet, Rfl: 5.  She denies medication side effects.    All of the other chronic condition(s) listed above are stable w/o issues.    /68   Pulse 68   Temp 97.6 °F (36.4 °C) (Oral)   Resp 14   Ht 170.2 cm (67\")   Wt 70.8 kg (156 lb)   SpO2 97%   BMI 24.43 kg/m²     Results for orders placed or performed in visit on 01/23/24   ToxAssure Flex 15, Ur -    Specimen: Urine   Result Value Ref Range    Report Summary FINAL     CREATININE 52 mg/dL    Amphetamines, IA Negative CUTOFF:300 ng/mL    Benzodiazepines +POSITIVE+     Diazepam Urine, Qualitative Not Detected ng/mg creat    Desmethyldiazepam Not Detected ng/mg creat    Oxazepam, urine Not Detected ng/mg creat    Temazepam Not Detected ng/mg " creat    Alprazolam Urine, Conf 262 ng/mg creat    Alpha-hydroxyalprazolam, Urine 304 ng/mg creat    Desalkylflurazepam, Urine Not Detected ng/mg creat    Lorazepam, Urine Not Detected ng/mg creat    Alpha-hydroxytriazolam, Urine Not Detected ng/mg creat    Clonazepam Not Detected ng/mg creat    7- AMINOCLONAZEPAM Not Detected ng/mg creat    Midazolam, Urine Not Detected ng/mg creat    Alpha-hydroxymidazolam, Urine Not Detected ng/mg creat    Flunitrazepam Not Detected ng/mg creat    DESMETHYLFLUNITRAZEPAM Not Detected ng/mg creat    COCAINE / METABOLITE, IA Negative CUTOFF:150 ng/mL    Ethyl Alcohol, Enz Negative CUTOFF:0.020 g/dL    Cannavinoids IA Comment CUTOFF:20 ng/mL    6-Acetylmorphine IA Negative CUTOFF:10 ng/mL    Opiate Class IA Negative CUTOFF:100 ng/mL    Oxycodone Class IA Negative CUTOFF:100 ng/mL    METHADONE, IA Negative CUTOFF:100 ng/mL    Methadone MTB IA Negative CUTOFF:100 ng/mL    Buprenorphine, Urine Negative     Buprenorphine, Urine Not Detected ng/mg creat    Norbuprenorphine Not Detected ng/mg creat    Fentanyl Negative     Fentanyl, Urine Not Detected ng/mg creat    Norfentanyl Urine Not Detected ng/mg creat    Tapentadol, IA Negative CUTOFF:200 ng/mL    TRAMADOL, IA Negative CUTOFF:200 ng/mL    Barbiturates, IA Negative CUTOFF:200 ng/mL    PHENCYCLIDINE, IA Negative CUTOFF:25 ng/mL   Cannabinoids, MS, Ur RFX -   Result Value Ref Range    Cannabinoid Confirmation +POSITIVE+     Carboxy THC >1923 ng/mg creat             The following portions of the patient's history were reviewed and updated as appropriate: allergies, current medications, past family history, past medical history, past social history, past surgical history, and problem list.    Review of Systems   Constitutional:  Negative for activity change, chills and fever.   Respiratory:  Negative for cough.    Cardiovascular:  Negative for chest pain.   Psychiatric/Behavioral:  Negative for dysphoric mood.        Objective     BMI is  within normal parameters. No other follow-up for BMI required.        Physical Exam  Vitals and nursing note reviewed.   Constitutional:       General: She is not in acute distress.     Appearance: She is well-developed.   Cardiovascular:      Rate and Rhythm: Normal rate and regular rhythm.   Pulmonary:      Effort: Pulmonary effort is normal.      Breath sounds: Normal breath sounds.   Neurological:      Mental Status: She is alert and oriented to person, place, and time.   Psychiatric:         Behavior: Behavior normal.         Thought Content: Thought content normal.             Diagnoses and all orders for this visit:    1. Primary insomnia (Primary)  -     zolpidem (AMBIEN) 10 MG tablet; Take 1 tablet by mouth At Night As Needed for Sleep.  Dispense: 30 tablet; Refill: 2    2. Acute reaction to stress  -     ALPRAZolam (XANAX) 0.5 MG tablet; Take 1 tablet by mouth 2 (Two) Times a Day.  Dispense: 60 tablet; Refill: 2                Private car

## 2024-07-25 NOTE — OB RN DELIVERY SUMMARY - NS_DELIVERYASSIST1_OBGYN_ALL_OB_FT
Pt. Identified for post discharge services. Initial outreach call to introduce self, available services, and assess needs.  Voicemail message left with my contact information.   MITA Méndez  [facility]    Contact: [phone]  
Luther Gaming MD

## 2024-09-17 ENCOUNTER — APPOINTMENT (OUTPATIENT)
Dept: GASTROENTEROLOGY | Facility: CLINIC | Age: 34
End: 2024-09-17

## 2024-09-27 ENCOUNTER — EMERGENCY (EMERGENCY)
Facility: HOSPITAL | Age: 34
LOS: 1 days | Discharge: LEFT BEFORE DISCHARGE | End: 2024-09-27
Attending: STUDENT IN AN ORGANIZED HEALTH CARE EDUCATION/TRAINING PROGRAM
Payer: COMMERCIAL

## 2024-09-27 VITALS
OXYGEN SATURATION: 98 % | RESPIRATION RATE: 18 BRPM | DIASTOLIC BLOOD PRESSURE: 85 MMHG | SYSTOLIC BLOOD PRESSURE: 127 MMHG | HEART RATE: 71 BPM | HEIGHT: 66 IN | TEMPERATURE: 98 F | WEIGHT: 201.5 LBS

## 2024-09-27 DIAGNOSIS — Z98.891 HISTORY OF UTERINE SCAR FROM PREVIOUS SURGERY: Chronic | ICD-10-CM

## 2024-09-27 PROCEDURE — T1013: CPT

## 2024-09-27 PROCEDURE — L9991: CPT

## 2024-09-27 NOTE — ED ADULT TRIAGE NOTE - CHIEF COMPLAINT QUOTE
c/o Wise  and nausea for last couple of weeks worsening last night. pmh of migraines. sent in from clinic for tressa

## 2025-01-16 NOTE — ED PROVIDER NOTE - NSTIMEPROVIDERCAREINITIATE_GEN_ER
No. MANDI screening performed.  STOP BANG Legend: 0-2 = LOW Risk; 3-4 = INTERMEDIATE Risk; 5-8 = HIGH Risk
08-Dec-2022 23:23

## 2025-01-21 NOTE — ED ADULT TRIAGE NOTE - DIRECT TO ROOM CARE INITIATED:
Received request via: Pharmacy    Was the patient seen in the last year in this department? Yes    Does the patient have an active prescription (recently filled or refills available) for medication(s) requested? No    Pharmacy Name: [facility] [address]    Does the patient have group home Plus and need 100-day supply? (This applies to ALL medications) Patient does not have [facility]  
28-Dec-2023 12:27

## (undated) DEVICE — VENODYNE/SCD SLEEVE CALF MEDIUM

## (undated) DEVICE — PACK IV START WITH CHG

## (undated) DEVICE — MASK PROCED EARLOOP 50/BX LRC COVID ADD

## (undated) DEVICE — SOL IRR BAG NS 0.9% 1000ML

## (undated) DEVICE — SYR SLIP 10CC

## (undated) DEVICE — WARMING BLANKET FULL ADULT

## (undated) DEVICE — DRSG 2X2

## (undated) DEVICE — TUBING IV EXTENSION MACRO W CLAVE 7"

## (undated) DEVICE — UNDERPAD LINEN SAVER 23 X 36"

## (undated) DEVICE — CATH IV SAFE BC 22G X 1" (BLUE)

## (undated) DEVICE — SENSOR O2 FINGER ADULT

## (undated) DEVICE — FORCEP RADIAL JAW 4 W NDL 2.4MM 2.8MM 240CM ORANGE DISP

## (undated) DEVICE — SOL BAG NS 0.9% 1000ML

## (undated) DEVICE — TUBING ALARIS PUMP MODULE NON-DEHP

## (undated) DEVICE — BITE BLOCK ADULT 20 X 27MM (GREEN)

## (undated) DEVICE — GOWN IMPERV XL

## (undated) DEVICE — DRSG CURITY GAUZE SPONGE 4 X 4" 12-PLY NON-STERILE

## (undated) DEVICE — DENTURE CUP PINK

## (undated) DEVICE — SYR LUER SLIP TIP 50CC

## (undated) DEVICE — SYR IV FLUSH SALINE 10ML 30/TY

## (undated) DEVICE — SOL IRR BAG H2O 1000ML

## (undated) DEVICE — FORCEP BIOPSY ENDOSCOPIC 2.8MM DISP